# Patient Record
Sex: MALE | Race: WHITE | Employment: OTHER | URBAN - METROPOLITAN AREA
[De-identification: names, ages, dates, MRNs, and addresses within clinical notes are randomized per-mention and may not be internally consistent; named-entity substitution may affect disease eponyms.]

---

## 2017-02-14 ENCOUNTER — OFFICE VISIT (OUTPATIENT)
Dept: FAMILY MEDICINE | Facility: CLINIC | Age: 62
End: 2017-02-14
Payer: COMMERCIAL

## 2017-02-14 VITALS
HEIGHT: 76 IN | WEIGHT: 249 LBS | SYSTOLIC BLOOD PRESSURE: 129 MMHG | HEART RATE: 77 BPM | BODY MASS INDEX: 30.32 KG/M2 | DIASTOLIC BLOOD PRESSURE: 81 MMHG | OXYGEN SATURATION: 97 % | TEMPERATURE: 97.2 F

## 2017-02-14 DIAGNOSIS — E88.810 METABOLIC SYNDROME X: Primary | ICD-10-CM

## 2017-02-14 DIAGNOSIS — I10 BENIGN ESSENTIAL HYPERTENSION: ICD-10-CM

## 2017-02-14 PROCEDURE — 99213 OFFICE O/P EST LOW 20 MIN: CPT | Performed by: INTERNAL MEDICINE

## 2017-02-14 RX ORDER — METOPROLOL SUCCINATE 25 MG/1
25 TABLET, EXTENDED RELEASE ORAL DAILY
Qty: 30 TABLET | Refills: 3 | Status: SHIPPED | OUTPATIENT
Start: 2017-02-14 | End: 2017-05-08

## 2017-02-14 NOTE — PROGRESS NOTES
"[facility]  CLINIC PROGRESS NOTE    Subjective:     Metabolic syndrome X   Memo Lai continues on atorvastatin 20 mg daily and taking aspirin 81 mg daily  Benign essential hypertension   Blood pressure was recently checked at [facility] physical and was elevated 158/86.  He returns to the clinic today for recheck.  He is not taking any blood pressure medications and has no acute concerns.      Past medical history, medications, allergies, social history, family history reviewed and updated in [facility] as of 2/14/2017 .    ROS  CONSTITUTIONAL: no fatigue, no unexpected change in weight  SKIN: no worrisome rashes, no worrisome moles, no worrisome lesions  EYES: no acute vision problems or changes  ENT: no ear problems, no mouth problems, no throat problems  RESP: no significant cough, no shortness of breath  CV: no chest pain, no palpitations, no new or worsening peripheral edema  GI: no nausea, no vomiting, no constipation, no diarrhea  : no frequency, no dysuria, no hematuria  MS: no claudication, no myalgias, no joint aches  PSYCHIATRIC: no changes in mood or affect      Objective:  Vitals  /81  Pulse 77  Temp 97.2  F (36.2  C)  Ht 6' 4\" (1.93 m)  Wt 249 lb (112.9 kg)  SpO2 97%  BMI 30.31 kg/m2  GEN: Alert Oriented x3 NAD  HEENT: Atraumatic, normocephalic  CV: RRR no murmurs or rubs  PULM: CTA no wheezes or crackles  ABD:  no hepatosplenomegally  SKIN: No visible skin lesion or ulcerations  EXT:  No edema bilateral lower extremities  NEURO: Gait and station deferred, No focal neurologic deficits  PSYCH: Mood good, affect mood congruent    No results found for this or any previous visit (from the past 24 hour(s)).    Assessment/Plan:  Patient Instructions   (E88.81) Metabolic syndrome X  (primary encounter diagnosis)  Comment: Continue to take atorvastatin and asa daily   Plan:     (I10) Benign essential hypertension  Comment: blood pressure is well controlled today, but given the elevations " recently we will start a low dose of a beta blocker to help keep blood pressure better controlled - follow up in 2 weeks at pharmacy.  Plan: metoprolol (TOPROL-XL) 25 MG 24 hr tablet              15 minutes spent with patient.  Over 50% of time counseling    Follow up in 2 weeks    Disclaimer: This note consists of symbols derived from keyboarding, dictation and/or voice recognition software. As a result, there may be errors in the script that have gone undetected. Please consider this when interpreting information found in this chart.    Mathew Ernst MD  [phone]

## 2017-02-14 NOTE — PATIENT INSTRUCTIONS
(E88.81) Metabolic syndrome X  (primary encounter diagnosis)  Comment: Continue to take atorvastatin and asa daily   Plan:     (I10) Benign essential hypertension  Comment: blood pressure is well controlled today, but given the elevations recently we will start a low dose of a beta blocker to help keep blood pressure better controlled - follow up in 2 weeks at pharmacy.  Plan: metoprolol (TOPROL-XL) 25 MG 24 hr tablet

## 2017-02-14 NOTE — MR AVS SNAPSHOT
After Visit Summary   2/14/2017    Memo Lai    MRN: [id number]           Patient Information     Date Of Birth          1955        Visit Information        Provider Department      2/14/2017 4:00 PM Mathew Ernst MD [facility]        Today's Diagnoses     Metabolic syndrome X    -  1    Benign essential hypertension          Care Instructions    (E88.81) Metabolic syndrome X  (primary encounter diagnosis)  Comment: Continue to take metoprolol X daily   Plan:     (I10) Benign essential hypertension  Comment: blood pressure is well controlled today, but given the elevations recently we will start a low dose of a beta blocker to help keep blood pressure better controlled - follow up in 2 weeks at pharmacy.  Plan: metoprolol (TOPROL-XL) 25 MG 24 hr tablet                    Follow-ups after your visit        Who to contact     If you have questions or need follow up information about today's clinic visit or your schedule please contact [facility] directly at [phone].  Normal or non-critical lab and imaging results will be communicated to you by Fisochart, letter or phone within 4 business days after the clinic has received the results. If you do not hear from us within 7 days, please contact the clinic through [email or web address] or phone. If you have a critical or abnormal lab result, we will notify you by phone as soon as possible.  Submit refill requests through [employer] or call your pharmacy and they will forward the refill request to us. Please allow 3 business days for your refill to be completed.          Additional Information About Your Visit        Fisochart Information     [employer] gives you secure access to your electronic health record. If you see a primary care provider, you can also send messages to your care team and make appointments. If you have questions, please call your primary care clinic.  If you do not have a primary care provider, please call  "[phone] and they will assist you.        Care EveryWhere ID     This is your Care EveryWhere ID. This could be used by other organizations to access your [place] medical records  [id number]        Your Vitals Were     Pulse Temperature Height Pulse Oximetry BMI (Body Mass Index)       77 97.2  F (36.2  C) 6' 4\" (1.93 m) 97% 30.31 kg/m2        Blood Pressure from Last 3 Encounters:   02/14/17 129/81   08/12/16 120/80   07/06/16 137/80    Weight from Last 3 Encounters:   02/14/17 249 lb (112.9 kg)   08/12/16 240 lb (108.9 kg)   07/06/16 251 lb 4.8 oz (114 kg)              Today, you had the following     No orders found for display         Today's Medication Changes          These changes are accurate as of: 2/14/17  4:16 PM.  If you have any questions, ask your nurse or doctor.               Start taking these medicines.        Dose/Directions    metoprolol 25 MG 24 hr tablet   Commonly known as:  TOPROL-XL   Used for:  Benign essential hypertension   Started by:  Mathew Ernst MD        Dose:  25 mg   Take 1 tablet (25 mg) by mouth daily   Quantity:  30 tablet   Refills:  3            Where to get your medicines      These medications were sent to [facility] [address]  [address], [address], [facility] [address]     Phone:  [phone]     metoprolol 25 MG 24 hr tablet                Primary Care Provider Office Phone # Fax #    Mathew Ernst [phone]       [facility] [address]  [facility] [address]        Thank you!     Thank you for choosing [facility]  for your care. Our goal is always to provide you with excellent care. Hearing back from our patients is one way we can continue to improve our services. Please take a few minutes to complete the written survey that you may receive in the mail after your visit with us. Thank you!             Your Updated Medication List - " Protect others around you: Learn how to safely use, store and throw away your medicines at www.disposemymeds.org.          This list is accurate as of: 2/14/17  4:16 PM.  Always use your most recent med list.                   Brand Name Dispense Instructions for use    aspirin 81 MG EC tablet     90 tablet    Take 1 tablet by mouth daily.       atorvastatin 20 MG tablet    LIPITOR    90 tablet    Take 1 tablet (20 mg) by mouth daily       glucosamine-chondroitinoitin 2057-9708 MG/30ML Liqd      Take by mouth daily       metoprolol 25 MG 24 hr tablet    TOPROL-XL    30 tablet    Take 1 tablet (25 mg) by mouth daily       Multi-vitamin Tabs tablet     100 tablet    Take 1 tablet by mouth daily.

## 2017-02-14 NOTE — NURSING NOTE
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    Weight as of this encounter: 249 lb (112.9 kg).  Medication Reconciliation: pilar MENJIVAR CMA      "

## 2017-02-16 ENCOUNTER — APPOINTMENT (OUTPATIENT)
Dept: CT IMAGING | Facility: CLINIC | Age: 62
End: 2017-02-16
Attending: EMERGENCY MEDICINE
Payer: COMMERCIAL

## 2017-02-16 ENCOUNTER — HOSPITAL ENCOUNTER (EMERGENCY)
Facility: CLINIC | Age: 62
Discharge: HOME | End: 2017-02-16
Attending: EMERGENCY MEDICINE | Admitting: EMERGENCY MEDICINE
Payer: COMMERCIAL

## 2017-02-16 VITALS
BODY MASS INDEX: 29.83 KG/M2 | OXYGEN SATURATION: 98 % | HEART RATE: 68 BPM | RESPIRATION RATE: 20 BRPM | DIASTOLIC BLOOD PRESSURE: 82 MMHG | HEIGHT: 76 IN | SYSTOLIC BLOOD PRESSURE: 147 MMHG | TEMPERATURE: 98.1 F | WEIGHT: 245 LBS

## 2017-02-16 DIAGNOSIS — N20.1 CALCULUS OF URETER: ICD-10-CM

## 2017-02-16 LAB
ALBUMIN UR-MCNC: NEGATIVE MG/DL
ANION GAP SERPL CALCULATED.3IONS-SCNC: 12 MMOL/L (ref 3–14)
APPEARANCE UR: CLEAR
BASOPHILS # BLD AUTO: 0 10E9/L (ref 0–0.2)
BASOPHILS NFR BLD AUTO: 0.2 %
BILIRUB UR QL STRIP: NEGATIVE
BUN SERPL-MCNC: 21 MG/DL (ref 7–30)
CALCIUM SERPL-MCNC: 9 MG/DL (ref 8.5–10.1)
CHLORIDE SERPL-SCNC: 104 MMOL/L (ref 94–109)
CO2 SERPL-SCNC: 24 MMOL/L (ref 20–32)
COLOR UR AUTO: YELLOW
CREAT SERPL-MCNC: 1.02 MG/DL (ref 0.66–1.25)
DIFFERENTIAL METHOD BLD: ABNORMAL
EOSINOPHIL # BLD AUTO: 0.1 10E9/L (ref 0–0.7)
EOSINOPHIL NFR BLD AUTO: 0.6 %
ERYTHROCYTE [DISTWIDTH] IN BLOOD BY AUTOMATED COUNT: 13.3 % (ref 10–15)
GFR SERPL CREATININE-BSD FRML MDRD: 74 ML/MIN/1.7M2
GLUCOSE SERPL-MCNC: 111 MG/DL (ref 70–99)
GLUCOSE UR STRIP-MCNC: NEGATIVE MG/DL
HCT VFR BLD AUTO: 42.6 % (ref 40–53)
HGB BLD-MCNC: 14.8 G/DL (ref 13.3–17.7)
HGB UR QL STRIP: ABNORMAL
HYALINE CASTS #/AREA URNS LPF: ABNORMAL /LPF (ref 0–2)
IMM GRANULOCYTES # BLD: 0 10E9/L (ref 0–0.4)
IMM GRANULOCYTES NFR BLD: 0.2 %
KETONES UR STRIP-MCNC: 15 MG/DL
LEUKOCYTE ESTERASE UR QL STRIP: NEGATIVE
LYMPHOCYTES # BLD AUTO: 1.6 10E9/L (ref 0.8–5.3)
LYMPHOCYTES NFR BLD AUTO: 12.3 %
MCH RBC QN AUTO: 32 PG (ref 26.5–33)
MCHC RBC AUTO-ENTMCNC: 34.7 G/DL (ref 31.5–36.5)
MCV RBC AUTO: 92 FL (ref 78–100)
MONOCYTES # BLD AUTO: 1 10E9/L (ref 0–1.3)
MONOCYTES NFR BLD AUTO: 7.3 %
MUCOUS THREADS #/AREA URNS LPF: PRESENT /LPF
NEUTROPHILS # BLD AUTO: 10.4 10E9/L (ref 1.6–8.3)
NEUTROPHILS NFR BLD AUTO: 79.4 %
NITRATE UR QL: NEGATIVE
NRBC # BLD AUTO: 0 10*3/UL
NRBC BLD AUTO-RTO: 0 /100
PH UR STRIP: 5.5 PH (ref 5–7)
PLATELET # BLD AUTO: 265 10E9/L (ref 150–450)
POTASSIUM SERPL-SCNC: 3.6 MMOL/L (ref 3.4–5.3)
RBC # BLD AUTO: 4.62 10E12/L (ref 4.4–5.9)
RBC #/AREA URNS AUTO: ABNORMAL /HPF (ref 0–2)
SODIUM SERPL-SCNC: 140 MMOL/L (ref 133–144)
SP GR UR STRIP: 1.02 (ref 1–1.03)
URN SPEC COLLECT METH UR: ABNORMAL
UROBILINOGEN UR STRIP-ACNC: 0.2 EU/DL (ref 0.2–1)
WBC # BLD AUTO: 13.1 10E9/L (ref 4–11)
WBC #/AREA URNS AUTO: ABNORMAL /HPF (ref 0–2)

## 2017-02-16 PROCEDURE — 80048 BASIC METABOLIC PNL TOTAL CA: CPT | Performed by: EMERGENCY MEDICINE

## 2017-02-16 PROCEDURE — 96375 TX/PRO/DX INJ NEW DRUG ADDON: CPT

## 2017-02-16 PROCEDURE — 99285 EMERGENCY DEPT VISIT HI MDM: CPT | Mod: 25

## 2017-02-16 PROCEDURE — 85025 COMPLETE CBC W/AUTO DIFF WBC: CPT | Performed by: EMERGENCY MEDICINE

## 2017-02-16 PROCEDURE — 74176 CT ABD & PELVIS W/O CONTRAST: CPT

## 2017-02-16 PROCEDURE — 25000128 H RX IP 250 OP 636: Performed by: EMERGENCY MEDICINE

## 2017-02-16 PROCEDURE — 81001 URINALYSIS AUTO W/SCOPE: CPT | Performed by: EMERGENCY MEDICINE

## 2017-02-16 PROCEDURE — 96376 TX/PRO/DX INJ SAME DRUG ADON: CPT

## 2017-02-16 PROCEDURE — 96361 HYDRATE IV INFUSION ADD-ON: CPT

## 2017-02-16 PROCEDURE — 96374 THER/PROPH/DIAG INJ IV PUSH: CPT

## 2017-02-16 RX ORDER — HYDROMORPHONE HYDROCHLORIDE 1 MG/ML
0.5 INJECTION, SOLUTION INTRAMUSCULAR; INTRAVENOUS; SUBCUTANEOUS ONCE
Status: DISCONTINUED | OUTPATIENT
Start: 2017-02-16 | End: 2017-02-16 | Stop reason: HOSPADM

## 2017-02-16 RX ORDER — HYDROCODONE BITARTRATE AND ACETAMINOPHEN 5; 325 MG/1; MG/1
1-2 TABLET ORAL EVERY 6 HOURS PRN
Qty: 15 TABLET | Refills: 0 | Status: SHIPPED | OUTPATIENT
Start: 2017-02-16 | End: 2017-07-26

## 2017-02-16 RX ORDER — SODIUM CHLORIDE 9 MG/ML
1000 INJECTION, SOLUTION INTRAVENOUS CONTINUOUS
Status: DISCONTINUED | OUTPATIENT
Start: 2017-02-16 | End: 2017-02-16 | Stop reason: HOSPADM

## 2017-02-16 RX ORDER — HYDROMORPHONE HYDROCHLORIDE 1 MG/ML
0.5 INJECTION, SOLUTION INTRAMUSCULAR; INTRAVENOUS; SUBCUTANEOUS
Status: COMPLETED | OUTPATIENT
Start: 2017-02-16 | End: 2017-02-16

## 2017-02-16 RX ORDER — ONDANSETRON 4 MG/1
4 TABLET, ORALLY DISINTEGRATING ORAL EVERY 8 HOURS PRN
Qty: 10 TABLET | Refills: 0 | Status: SHIPPED | OUTPATIENT
Start: 2017-02-16 | End: 2017-02-19

## 2017-02-16 RX ORDER — ONDANSETRON 2 MG/ML
4 INJECTION INTRAMUSCULAR; INTRAVENOUS EVERY 30 MIN PRN
Status: DISCONTINUED | OUTPATIENT
Start: 2017-02-16 | End: 2017-02-16 | Stop reason: HOSPADM

## 2017-02-16 RX ORDER — TAMSULOSIN HYDROCHLORIDE 0.4 MG/1
0.4 CAPSULE ORAL AT BEDTIME
Qty: 10 CAPSULE | Refills: 0 | Status: SHIPPED | OUTPATIENT
Start: 2017-02-16 | End: 2017-02-26

## 2017-02-16 RX ADMIN — HYDROMORPHONE HYDROCHLORIDE 0.5 MG: 1 INJECTION, SOLUTION INTRAMUSCULAR; INTRAVENOUS; SUBCUTANEOUS at 18:48

## 2017-02-16 RX ADMIN — HYDROMORPHONE HYDROCHLORIDE 0.5 MG: 1 INJECTION, SOLUTION INTRAMUSCULAR; INTRAVENOUS; SUBCUTANEOUS at 18:58

## 2017-02-16 RX ADMIN — HYDROMORPHONE HYDROCHLORIDE 0.5 MG: 1 INJECTION, SOLUTION INTRAMUSCULAR; INTRAVENOUS; SUBCUTANEOUS at 19:59

## 2017-02-16 RX ADMIN — SODIUM CHLORIDE 1000 ML: 9 INJECTION, SOLUTION INTRAVENOUS at 18:48

## 2017-02-16 RX ADMIN — ONDANSETRON 4 MG: 2 SOLUTION INTRAMUSCULAR; INTRAVENOUS at 18:48

## 2017-02-16 ASSESSMENT — ENCOUNTER SYMPTOMS
HEMATURIA: 0
NAUSEA: 1
CONSTIPATION: 0
FEVER: 0
DIARRHEA: 0
FLANK PAIN: 1
VOMITING: 0
DIFFICULTY URINATING: 0
ABDOMINAL PAIN: 0
CHILLS: 0
DYSURIA: 0

## 2017-02-16 NOTE — ED AVS SNAPSHOT
Emergency Department    [address] [facility] [address]    Phone:  [phone]    Fax:  [phone]                                       Memo Lai   MRN: [id number]    Department:   Emergency Department   Date of Visit:  2/16/2017           After Visit Summary Signature Page     I have received my discharge instructions, and my questions have been answered. I have discussed any challenges I see with this plan with the nurse or doctor.    ..........................................................................................................................................  Patient/Patient Representative Signature      ..........................................................................................................................................  Patient Representative Print Name and Relationship to Patient    ..................................................               ................................................  Date                                            Time    ..........................................................................................................................................  Reviewed by Signature/Title    ...................................................              ..............................................  Date                                                            Time

## 2017-02-16 NOTE — ED AVS SNAPSHOT
"  Emergency Department    [address] [facility] [address]    Phone:  [phone]    Fax:  [phone]                                       Memo Lai   MRN: [id number]    Department:   Emergency Department   Date of Visit:  2/16/2017           Patient Information     Date Of Birth          1955        Your diagnoses for this visit were:     Calculus of ureter        You were seen by Rachid Catherine MD.      Follow-up Information     Follow up with Mathew Ernst MD In 3 days.    Specialty:  Internal Medicine    Contact information:    [facility]  [address]  [facility] [address]  [phone]          Follow up with  Emergency Department.    Specialty:  EMERGENCY MEDICINE    Why:  As needed, If symptoms worsen    Contact information:    [address] [id number] [phone]        Discharge Instructions          * KIDNEY STONE (w/ Colic)    The sharp cramping pain and nausea/vomiting that you have is due to a small stone which has formed in the kidney and is now passing down a narrow tube (ureter) on its way to your bladder. Once it reaches your bladder, the pain will stop. The stone may pass in your urine stream in one piece. [The size may be 1/16\" to 1/4\" (1-6mm)]. Or, the stone may also break up into ash fragments which you may not even notice.  Once you have had a kidney stone there is a risk for recurrence in the future.  HOME CARE:    Drink lots of fluid (at least 8-10 glasses of water a day).    Most stones will pass on their own, but may take from a few hours to a few days.    Each time you urinate, do so in a jar. Pour the urine from the jar through the strainer and into the toilet. Continue doing this until 24 hours after your pain stops. By then, if there was a kidney stone, it should pass from your bladder. Some stones dissolve into sand-like particles and pass right through the strainer. In that " case, you won't ever see a stone.    Save any stone that you find in the strainer and bring it to your doctor for analysis. It may be possible to prevent certain types of stones from forming. Therefore, it is important to know what kind of stone you have.    Try to stay as active as possible since this will help the stone pass. Do not stay in bed unless your pain prevents you from getting up. You may notice a red, pink or brown color to your urine. This is normal while passing a kidney stone.  FOLLOW UP with your doctor or return to this facility if the pain lasts more than 48 hours.  GET PROMPT MEDICAL ATTENTION if any of the following occur:    Pain that is not controlled by the medicine given    Repeated vomiting or unable to keep down fluids    Weakness, dizziness or fainting    Fever over 101  F (38.3  C)    Passage of solid red or brown urine (can't see through it) or urine with lots of blood clots    Unable to pass urine for 8 hours and increasing bladder pressure    [id number] [address], [place] [address]. All rights reserved. This information is not intended as a substitute for professional medical care. Always follow your healthcare professional's instructions.      24 Hour Appointment Hotline       To make an appointment at any [facility] clinic, call [phone] ([phone]). If you don't have a family doctor or clinic, we will help you find one. [facility] clinics are conveniently located to serve the needs of you and your family.             Review of your medicines      START taking        Dose / Directions Last dose taken    HYDROcodone-acetaminophen 5-325 MG per tablet   Commonly known as:  NORCO   Dose:  1-2 tablet   Quantity:  15 tablet        Take 1-2 tablets by mouth every 6 hours as needed   Refills:  0        ondansetron 4 MG ODT tab   Commonly known as:  ZOFRAN ODT   Dose:  4 mg   Quantity:  10 tablet        Take 1 tablet (4 mg) by mouth every 8 hours as needed    Refills:  0        tamsulosin 0.4 MG capsule   Commonly known as:  FLOMAX   Dose:  0.4 mg   Quantity:  10 capsule        Take 1 capsule (0.4 mg) by mouth At Bedtime for 10 doses   Refills:  0          Our records show that you are taking the medicines listed below. If these are incorrect, please call your family doctor or clinic.        Dose / Directions Last dose taken    aspirin 81 MG EC tablet   Dose:  81 mg   Quantity:  90 tablet        Take 1 tablet by mouth daily.   Refills:  3        atorvastatin 20 MG tablet   Commonly known as:  LIPITOR   Dose:  20 mg   Quantity:  90 tablet        Take 1 tablet (20 mg) by mouth daily   Refills:  3        glucosamine-chondroitinoitin 7819-1515 MG/30ML Liqd        Take by mouth daily   Refills:  0        metoprolol 25 MG 24 hr tablet   Commonly known as:  TOPROL-XL   Dose:  25 mg   Quantity:  30 tablet        Take 1 tablet (25 mg) by mouth daily   Refills:  3        Multi-vitamin Tabs tablet   Dose:  1 tablet   Quantity:  100 tablet        Take 1 tablet by mouth daily.   Refills:  3                Prescriptions were sent or printed at these locations (3 Prescriptions)                   Other Prescriptions                Printed at Department/Unit printer (3 of 3)         HYDROcodone-acetaminophen (NORCO) 5-325 MG per tablet               ondansetron (ZOFRAN ODT) 4 MG ODT tab               tamsulosin (FLOMAX) 0.4 MG capsule                Procedures and tests performed during your visit     *UA reflex to Microscopic (ED Lab POCT Only 3-11)    Abd/pelvis CT no contrast - Stone Protocol    Basic metabolic panel    CBC with platelets differential    Urine Microscopic      Orders Needing Specimen Collection     None      Pending Results     Date and Time Order Name Status Description    2/16/2017 1856 Abd/pelvis CT no contrast - Stone Protocol Preliminary             Pending Culture Results     No orders found from 2/14/2017 to 2/17/2017.             Test Results from your  hospital stay     2/16/2017  7:41 PM - Interface, Flexilab Results      Component Results     Component Value Ref Range & Units Status    WBC 13.1 (H) 4.0 - 11.0 10e9/L Final    RBC Count 4.62 4.4 - 5.9 10e12/L Final    Hemoglobin 14.8 13.3 - 17.7 g/dL Final    Hematocrit 42.6 40.0 - 53.0 % Final    MCV 92 78 - 100 fl Final    MCH 32.0 26.5 - 33.0 pg Final    MCHC 34.7 31.5 - 36.5 g/dL Final    RDW 13.3 10.0 - 15.0 % Final    Platelet Count 265 150 - 450 10e9/L Final    Diff Method Automated Method  Final    % Neutrophils 79.4 % Final    % Lymphocytes 12.3 % Final    % Monocytes 7.3 % Final    % Eosinophils 0.6 % Final    % Basophils 0.2 % Final    % Immature Granulocytes 0.2 % Final    Nucleated RBCs 0 0 /100 Final    Absolute Neutrophil 10.4 (H) 1.6 - 8.3 10e9/L Final    Absolute Lymphocytes 1.6 0.8 - 5.3 10e9/L Final    Absolute Monocytes 1.0 0.0 - 1.3 10e9/L Final    Absolute Eosinophils 0.1 0.0 - 0.7 10e9/L Final    Absolute Basophils 0.0 0.0 - 0.2 10e9/L Final    Abs Immature Granulocytes 0.0 0 - 0.4 10e9/L Final    Absolute Nucleated RBC 0.0  Final         2/16/2017  7:55 PM - Interface, Flexilab Results      Component Results     Component Value Ref Range & Units Status    Sodium 140 133 - 144 mmol/L Final    Potassium 3.6 3.4 - 5.3 mmol/L Final    Chloride 104 94 - 109 mmol/L Final    Carbon Dioxide 24 20 - 32 mmol/L Final    Anion Gap 12 3 - 14 mmol/L Final    Glucose 111 (H) 70 - 99 mg/dL Final    Urea Nitrogen 21 7 - 30 mg/dL Final    Creatinine 1.02 0.66 - 1.25 mg/dL Final    GFR Estimate 74 >60 mL/min/1.7m2 Final    Non  GFR Calc    GFR Estimate If Black 90 >60 mL/min/1.7m2 Final    African American GFR Calc    Calcium 9.0 8.5 - 10.1 mg/dL Final         2/16/2017  9:02 PM - Interface, Flexilab Results      Component Results     Component Value Ref Range & Units Status    Color Urine Yellow  Final    Appearance Urine Clear  Final    Glucose Urine Negative NEG mg/dL Final    Bilirubin  Urine Negative NEG Final    Ketones Urine 15 (A) NEG mg/dL Final    Specific Gravity Urine 1.025 1.003 - 1.035 Final    Blood Urine Moderate (A) NEG Final    pH Urine 5.5 5.0 - 7.0 pH Final    Protein Albumin Urine Negative NEG mg/dL Final    Urobilinogen Urine 0.2 0.2 - 1.0 EU/dL Final    Nitrite Urine Negative NEG Final    Leukocyte Esterase Urine Negative NEG Final    Source Midstream Urine  Final         2/16/2017  8:14 PM - Interface, Radiant Ib      Narrative     CT ABDOMEN AND PELVIS WITHOUT CONTRAST   2/16/2017 8:01 PM     HISTORY: History of kidney stone 20 years ago. Right flank pain.    COMPARISON: None.    TECHNIQUE: Without intravenous or oral contrast, helical sections were  acquired from the top of the diaphragm through the pubic symphysis.  Coronal reconstructions were generated. Radiation dose for this scan  was reduced using automated exposure control, adjustment of the mA  and/or kV according to the patient's size, or iterative reconstruction  technique. (Renal stone protocol)    FINDINGS:     Right urinary tract: 0.3 cm calculus in the most distal aspect of the  ureter at the ureterovesicular junction. Mild dilatation of the  intrarenal collecting system and ureter. Two 0.2 cm nonobstructing  calculi in the inferior pole of the kidney. Slight perinephric  haziness.    Left urinary tract: Approximately four nonobstructing calculi in the  kidney, measuring up to 0.6 cm in diameter. No ureteral calculi. No  dilatation of the intrarenal collecting system or ureter. 2 cm cyst in  the inferior pole of the kidney.    Urinary bladder: No additional visualized calculi.    Remainder of the abdomen and pelvis: The liver, spleen, pancreas and  adrenal glands are unremarkable to the limits of a noncontrast CT  scan. 2.8 cm calculus in the gallbladder. The small and large bowel  are normal in caliber. The appendix is unremarkable. No bowel wall  thickening, pneumatosis or free intraperitoneal gas. No enlarged  lymph  nodes or free fluid in the abdomen or pelvis. Atherosclerotic  calcification in the abdominal aorta. Small periumbilical hernia  containing fat.    Scan through the lower chest is unremarkable.        Impression     IMPRESSION:   1. 0.3 cm calculus in the most distal aspect of the right ureter at  the ureterovesicular junction, resulting in mild obstruction.  2. A few nonobstructing bilateral renal calculi.           2/16/2017  9:02 PM - Interface, Flexilab Results      Component Results     Component Value Ref Range & Units Status    WBC Urine O - 2 0 - 2 /HPF Final    RBC Urine 2-5 (A) 0 - 2 /HPF Final    Hyaline Casts O - 2 0 - 2 /LPF Final    Mucous Urine Present (A) NEG /LPF Final                Clinical Quality Measure: Blood Pressure Screening     Your blood pressure was checked while you were in the emergency department today. The last reading we obtained was  BP: 147/82 . Please read the guidelines below about what these numbers mean and what you should do about them.  If your systolic blood pressure (the top number) is less than 120 and your diastolic blood pressure (the bottom number) is less than 80, then your blood pressure is normal. There is nothing more that you need to do about it.  If your systolic blood pressure (the top number) is 120-139 or your diastolic blood pressure (the bottom number) is 80-89, your blood pressure may be higher than it should be. You should have your blood pressure rechecked within a year by a primary care provider.  If your systolic blood pressure (the top number) is 140 or greater or your diastolic blood pressure (the bottom number) is 90 or greater, you may have high blood pressure. High blood pressure is treatable, but if left untreated over time it can put you at risk for heart attack, stroke, or kidney failure. You should have your blood pressure rechecked by a primary care provider within the next 4 weeks.  If your provider in the emergency department today  gave you specific instructions to follow-up with your doctor or provider even sooner than that, you should follow that instruction and not wait for up to 4 weeks for your follow-up visit.        Thank you for choosing [facility]       Thank you for choosing [facility] for your care. Our goal is always to provide you with excellent care. Hearing back from our patients is one way we can continue to improve our services. Please take a few minutes to complete the written survey that you may receive in the mail after you visit with us. Thank you!        [email or web address] work Information     [employer] gives you secure access to your electronic health record. If you see a primary care provider, you can also send messages to your care team and make appointments. If you have questions, please call your primary care clinic.  If you do not have a primary care provider, please call [phone] and they will assist you.        Care EveryWhere ID     This is your Care EveryWhere ID. This could be used by other organizations to access your [facility] medical records  [id number]        After Visit Summary       This is your record. Keep this with you and show to your community pharmacist(s) and doctor(s) at your next visit.

## 2017-02-17 NOTE — ED PROVIDER NOTES
"  History     Chief Complaint:  Flank Pain    HPI   Memo Lai is a 61 year old male with a history of kidney stone 20 years ago and hyperlipidemia who presents with right flank pain. The patient reports that he went to the bathroom around 3pm this afternoon and was able to urinate fine but developed sudden onset right flank pain after urinating. The pain has been progressively worsening since and did not respond to Ibuprofen, prompting him to come to the ED for evaluation. On arrival to the ED, the patient reports that he has right flank pain that he rates at 7/10 along with nausea. He denies any vomiting, fever, hematuria, diarrhea, or constipation. The patient states this feels similar to his kidney stone pain 20 years ago.     Allergies:  No known drug allergies     Medications:    Metoprolol  Atorvastatin  Glucosamine-chondroitin   Aspirin 81 mg    Past Medical History:    Hyperlipidemia  Hypertension  Kidney stone  Ruptured eardrum  Metabolic syndrome    Past Surgical History:    Meniscus tear and repair right knee  Kidney stone surgery    Family History:    Colon cancer  Cardiovascular     Social History:  Smoking status: Former smoker, quit 1981  Alcohol use: Yes, occasional   Presents to the ED with his wife  Marital Status:   [2]     Review of Systems   Constitutional: Negative for chills and fever.   Gastrointestinal: Positive for nausea. Negative for abdominal pain, constipation, diarrhea and vomiting.   Genitourinary: Positive for flank pain. Negative for difficulty urinating, dysuria and hematuria.   All other systems reviewed and are negative.      Physical Exam   Patient Vitals for the past 24 hrs:   BP Temp Temp src Pulse Resp SpO2 Height Weight   02/16/17 1844 147/82 - - - - - - -   02/16/17 1822 169/84 98.1  F (36.7  C) Oral 68 20 98 % 1.93 m (6' 4\") 111.1 kg (245 lb)     Physical Exam  General: Appears well-developed and well-nourished.   Head: No signs of trauma.   Mouth/Throat: " Oropharynx is clear and moist.   CV: Normal rate and regular rhythm.    Resp: Effort normal and breath sounds normal. No respiratory distress.   GI: Soft. There is no tenderness or guarding.  Normal bowel sounds.  Mild right CVA tenderness.  MSK: Normal range of motion. no edema. No Calf tenderness.  Neuro: The patient is alert and oriented.  Speech normal.  Skin: Skin is warm and dry. No rash noted.   Psych: normal mood and affect. behavior is normal.       Emergency Department Course   Imaging:  Radiographic findings were communicated with the patient who voiced understanding of the findings.    CT-scan Abdomen/Pelvis w/o contrast:  IMPRESSION:   1. 0.3 cm calculus in the most distal aspect of the right ureter at  the ureterovesicular junction, resulting in mild obstruction.  2. A few nonobstructing bilateral renal calculi.    Preliminary result per radiology.     Laboratory:  CBC: WBC 13.1(H), o/w WNL (HGB 14.8, )   BMP: Glucose 111(H), o/w WNL (Creatinine 1.02)  UA: Ketones 15, Blood moderate, RBC 2-5, Mucous present, o/w negative    Interventions:  1848: NS 1L IV Bolus  1848: Zofran 4 mg IV  1848: Dilaudid 0.5 mg IV  1858: Dilaudid 0.5 mg IV  1959: Dilaudid 0.5 mg IV    Emergency Department Course:  Past medical records, nursing notes, and vitals reviewed.  1839: I performed an exam of the patient and obtained history, as documented above.  IV inserted and blood drawn.  The patient was sent for a CT abdomen pelvis while in the emergency department, findings above.    2105: I rechecked the patient. Explained findings to the patient.    I rechecked the patient.  Findings and plan explained to the Patient. Patient discharged home with instructions regarding supportive care, medications, and reasons to return. The importance of close follow-up was reviewed.     Impression & Plan    Medical Decision Making:  Memo Lai is a 61 year old male who presents with flank pain. A broad differential diagnosis  was considered including diverticulitis, ureterolithiasis, tumor, colitis, cholecystitis, aneurysm, dissection, volvulus, appendicitis, splenic issue, stomach pathology, ulcer, hydronephrosis, pneumonia, rib fracture, UTI, pyelonephritis amongst many other etiologies. Signs and symptoms consistent with ureterolithiasis.  Patients pain is controlled in ED.  No signs of infected stone.  Patient is hemodynamically stable in ED. Plan is home with abdominal pain recheck by primary care physician or return to ED if symptoms worsen.  Return for fevers greater than 102, increasing pain, other new symptoms develop.  Ureterolithiasis precautions for home.  Questions were answered.     Diagnosis:    ICD-10-CM   1. Calculus of ureter N20.1     Disposition: Discharged to home    Discharge Medications:   Details   HYDROcodone-acetaminophen (NORCO) 5-325 MG per tablet Take 1-2 tablets by mouth every 6 hours as needed, Disp-15 tablet, R-0, Local Print      ondansetron (ZOFRAN ODT) 4 MG ODT tab Take 1 tablet (4 mg) by mouth every 8 hours as needed, Disp-10 tablet, R-0, Local Print      tamsulosin (FLOMAX) 0.4 MG capsule Take 1 capsule (0.4 mg) by mouth At Bedtime for 10 doses, Disp-10 capsule, R-0, Local Print     Netta Worley  2/16/2017    EMERGENCY DEPARTMENT    I, Netta Worley, am serving as a scribe at 6:39 PM on 2/16/2017 to document services personally performed by Rachid Catherine MD based on my observations and the provider's statements to me.        Rachid Catherine MD  02/20/17 4940

## 2017-02-17 NOTE — DISCHARGE INSTRUCTIONS
"   * KIDNEY STONE (w/ Colic)    The sharp cramping pain and nausea/vomiting that you have is due to a small stone which has formed in the kidney and is now passing down a narrow tube (ureter) on its way to your bladder. Once it reaches your bladder, the pain will stop. The stone may pass in your urine stream in one piece. [The size may be 1/16\" to 1/4\" (1-6mm)]. Or, the stone may also break up into ash fragments which you may not even notice.  Once you have had a kidney stone there is a risk for recurrence in the future.  HOME CARE:    Drink lots of fluid (at least 8-10 glasses of water a day).    Most stones will pass on their own, but may take from a few hours to a few days.    Each time you urinate, do so in a jar. Pour the urine from the jar through the strainer and into the toilet. Continue doing this until 24 hours after your pain stops. By then, if there was a kidney stone, it should pass from your bladder. Some stones dissolve into sand-like particles and pass right through the strainer. In that case, you won't ever see a stone.    Save any stone that you find in the strainer and bring it to your doctor for analysis. It may be possible to prevent certain types of stones from forming. Therefore, it is important to know what kind of stone you have.    Try to stay as active as possible since this will help the stone pass. Do not stay in bed unless your pain prevents you from getting up. You may notice a red, pink or brown color to your urine. This is normal while passing a kidney stone.  FOLLOW UP with your doctor or return to this facility if the pain lasts more than 48 hours.  GET PROMPT MEDICAL ATTENTION if any of the following occur:    Pain that is not controlled by the medicine given    Repeated vomiting or unable to keep down fluids    Weakness, dizziness or fainting    Fever over 101  F (38.3  C)    Passage of solid red or brown urine (can't see through it) or urine with lots of blood clots    Unable " to pass urine for 8 hours and increasing bladder pressure    [id number] Kevin Smith, [address] [place] [address], [place] [address]. All rights reserved. This information is not intended as a substitute for professional medical care. Always follow your healthcare professional's instructions.

## 2017-02-17 NOTE — ED NOTES
Bed: ED10  Expected date:   Expected time:   Means of arrival:   Comments:  For kidney stone patient

## 2017-03-09 ENCOUNTER — ALLIED HEALTH/NURSE VISIT (OUTPATIENT)
Dept: FAMILY MEDICINE | Facility: CLINIC | Age: 62
End: 2017-03-09
Payer: COMMERCIAL

## 2017-03-09 VITALS — DIASTOLIC BLOOD PRESSURE: 76 MMHG | SYSTOLIC BLOOD PRESSURE: 132 MMHG

## 2017-03-09 DIAGNOSIS — Z01.30 BP CHECK: Primary | ICD-10-CM

## 2017-03-09 PROCEDURE — 99207 ZZC NO CHARGE NURSE ONLY: CPT | Performed by: INTERNAL MEDICINE

## 2017-03-09 NOTE — MR AVS SNAPSHOT
After Visit Summary   3/9/2017    Memo Lai    MRN: [id number]           Patient Information     Date Of Birth          1955        Visit Information        Provider Department      3/9/2017 12:53 PM Mathew Ernst MD [facility]        Today's Diagnoses     BP check    -  1       Follow-ups after your visit        Who to contact     If you have questions or need follow up information about today's clinic visit or your schedule please contact [facility] directly at [phone].  Normal or non-critical lab and imaging results will be communicated to you by [employer], letter or phone within 4 business days after the clinic has received the results. If you do not hear from us within 7 days, please contact the clinic through Med.lyt or phone. If you have a critical or abnormal lab result, we will notify you by phone as soon as possible.  Submit refill requests through [employer] or call your pharmacy and they will forward the refill request to us. Please allow 3 business days for your refill to be completed.          Additional Information About Your Visit        MyChart Information     [employer] gives you secure access to your electronic health record. If you see a primary care provider, you can also send messages to your care team and make appointments. If you have questions, please call your primary care clinic.  If you do not have a primary care provider, please call [phone] and they will assist you.        Care EveryWhere ID     This is your Care EveryWhere ID. This could be used by other organizations to access your [facility] medical records  [id number]         Blood Pressure from Last 3 Encounters:   03/09/17 132/76   02/16/17 147/82   02/14/17 129/81    Weight from Last 3 Encounters:   02/16/17 245 lb (111.1 kg)   02/14/17 249 lb (112.9 kg)   08/12/16 240 lb (108.9 kg)              Today, you had the following     No orders found for display        Primary Care Provider Office Phone # Fax #    Mathew Ernst [phone]       [facility] [address] [facility] [address]  [facility] [address]        Thank you!     Thank you for choosing [facility]  for your care. Our goal is always to provide you with excellent care. Hearing back from our patients is one way we can continue to improve our services. Please take a few minutes to complete the written survey that you may receive in the mail after your visit with us. Thank you!             Your Updated Medication List - Protect others around you: Learn how to safely use, store and throw away your medicines at www.disposemymeds.org.          This list is accurate as of: 3/9/17 11:59 PM.  Always use your most recent med list.                   Brand Name Dispense Instructions for use    aspirin 81 MG EC tablet     90 tablet    Take 1 tablet by mouth daily.       atorvastatin 20 MG tablet    LIPITOR    90 tablet    Take 1 tablet (20 mg) by mouth daily       glucosamine-chondroitinoitin 3578-6643 MG/30ML Liqd      Take by mouth daily       HYDROcodone-acetaminophen 5-325 MG per tablet    NORCO    15 tablet    Take 1-2 tablets by mouth every 6 hours as needed       metoprolol 25 MG 24 hr tablet    TOPROL-XL    30 tablet    Take 1 tablet (25 mg) by mouth daily       Multi-vitamin Tabs tablet     100 tablet    Take 1 tablet by mouth daily.

## 2017-03-09 NOTE — PROGRESS NOTES
Memo Lai is enrolled/participating in the retail pharmacy Blood Pressure Goals Achievement Program (BPGAP).  Memo Lai was evaluated at [facility] on March 9, 2017 at which time his blood pressure was:    BP Readings from Last 3 Encounters:   03/09/17 132/76   02/16/17 147/82   02/14/17 129/81     Reviewed lifestyle modifications for blood pressure control and reduction: including making healthy food choices, managing weight, getting regular exercise, smoking cessation, reducing alcohol consumption, monitoring blood pressure regularly.     Memo Lai is not experiencing symptoms.1st bp reading 152/82, waited 5 minutes and 2nd bp 132/76    Follow-Up: BP is not at goal of < 150/90 mmHg (patient 60+ years of age without diabetes), Recommended follow-up in 1 month at the pharmacy. Routing to PCP as an FYI.    Completed by: Irlanda Luther, PharmD, [facility]     [facility]  [phone]

## 2017-03-09 NOTE — Clinical Note
Routing message to PCP for review -BP checked at pharmacy and noted to be above goal. Recommended patient follow-up with PCP.

## 2017-04-07 ENCOUNTER — ALLIED HEALTH/NURSE VISIT (OUTPATIENT)
Dept: FAMILY MEDICINE | Facility: CLINIC | Age: 62
End: 2017-04-07
Payer: COMMERCIAL

## 2017-04-07 VITALS — DIASTOLIC BLOOD PRESSURE: 78 MMHG | SYSTOLIC BLOOD PRESSURE: 128 MMHG

## 2017-04-07 DIAGNOSIS — Z01.30 BP CHECK: Primary | ICD-10-CM

## 2017-04-07 PROCEDURE — 99207 ZZC NO CHARGE NURSE ONLY: CPT | Performed by: INTERNAL MEDICINE

## 2017-04-07 NOTE — MR AVS SNAPSHOT
After Visit Summary   4/7/2017    Memo Lai    MRN: [id number]           Patient Information     Date Of Birth          1955        Visit Information        Provider Department      4/7/2017 9:20 AM Mathew Ernst MD [facility]        Today's Diagnoses     BP check    -  1       Follow-ups after your visit        Who to contact     If you have questions or need follow up information about today's clinic visit or your schedule please contact [facility] directly at [phone].  Normal or non-critical lab and imaging results will be communicated to you by [employer], letter or phone within 4 business days after the clinic has received the results. If you do not hear from us within 7 days, please contact the clinic through [employer] or phone. If you have a critical or abnormal lab result, we will notify you by phone as soon as possible.  Submit refill requests through ehealthtracker or call your pharmacy and they will forward the refill request to us. Please allow 3 business days for your refill to be completed.          Additional Information About Your Visit        MyChart Information     [employer] gives you secure access to your electronic health record. If you see a primary care provider, you can also send messages to your care team and make appointments. If you have questions, please call your primary care clinic.  If you do not have a primary care provider, please call [phone] and they will assist you.        Care EveryWhere ID     This is your Care EveryWhere ID. This could be used by other organizations to access your [facility] medical records  [id number]         Blood Pressure from Last 3 Encounters:   04/07/17 128/78   03/09/17 132/76   02/16/17 147/82    Weight from Last 3 Encounters:   02/16/17 245 lb (111.1 kg)   02/14/17 249 lb (112.9 kg)   08/12/16 240 lb (108.9 kg)              Today, you had the following     No orders found for display        Primary Care Provider Office Phone # Fax #    Mathew Ernst [phone]       [facility] [address] [facility] [address]  [facility] [address]        Thank you!     Thank you for choosing [facility]  for your care. Our goal is always to provide you with excellent care. Hearing back from our patients is one way we can continue to improve our services. Please take a few minutes to complete the written survey that you may receive in the mail after your visit with us. Thank you!             Your Updated Medication List - Protect others around you: Learn how to safely use, store and throw away your medicines at www.disposemymeds.org.          This list is accurate as of: 4/7/17  9:21 AM.  Always use your most recent med list.                   Brand Name Dispense Instructions for use    aspirin 81 MG EC tablet     90 tablet    Take 1 tablet by mouth daily.       atorvastatin 20 MG tablet    LIPITOR    90 tablet    Take 1 tablet (20 mg) by mouth daily       glucosamine-chondroitinoitin 4791-8287 MG/30ML Liqd      Take by mouth daily       HYDROcodone-acetaminophen 5-325 MG per tablet    NORCO    15 tablet    Take 1-2 tablets by mouth every 6 hours as needed       metoprolol 25 MG 24 hr tablet    TOPROL-XL    30 tablet    Take 1 tablet (25 mg) by mouth daily       Multi-vitamin Tabs tablet     100 tablet    Take 1 tablet by mouth daily.

## 2017-04-07 NOTE — PROGRESS NOTES
Memo Lai is enrolled/participating in the retail pharmacy Blood Pressure Goals Achievement Program (BPGAP).  Memo Lai was evaluated at [facility] on April 7, 2017 at which time his blood pressure was:    BP Readings from Last 3 Encounters:   04/07/17 128/78   03/09/17 132/76   02/16/17 147/82     Reviewed lifestyle modifications for blood pressure control and reduction: including making healthy food choices, managing weight, getting regular exercise, smoking cessation, reducing alcohol consumption, monitoring blood pressure regularly.     Memo Lai is not experiencing symptoms.    Follow-Up: BP is at goal of < 140/90mmHg (patient 18+ years of age with or without diabetes).  Recommended follow-up at pharmacy in 6 months.     Completed by: Irlanda Luther, PharmD, [facility]     [facility]  [phone]

## 2017-05-08 DIAGNOSIS — I10 BENIGN ESSENTIAL HYPERTENSION: ICD-10-CM

## 2017-05-08 NOTE — TELEPHONE ENCOUNTER
Pending Prescriptions:                       Disp   Refills    metoprolol (TOPROL-XL) 25 MG 24 hr tablet  30 tab*3        Sig: Take 1 tablet (25 mg) by mouth daily        Patient wants 90day supply  Last Written Prescription Date: 02/14/2017  Last Fill Quantity: 30, # refills: 3  Last Office Visit with [facility], P or [facility] prescribing provider: 02/14/2017       Potassium   Date Value Ref Range Status   02/16/2017 3.6 3.4 - 5.3 mmol/L Final     Creatinine   Date Value Ref Range Status   02/16/2017 1.02 0.66 - 1.25 mg/dL Final     BP Readings from Last 3 Encounters:   04/07/17 128/78   03/09/17 132/76   02/16/17 147/82

## 2017-05-08 NOTE — TELEPHONE ENCOUNTER
Patient requesting a 90 day supply of medication.  Please order 90 day supply.    [facility] Pharmacy Technician  [facility]  [phone] fax [phone]

## 2017-05-09 RX ORDER — METOPROLOL SUCCINATE 25 MG/1
25 TABLET, EXTENDED RELEASE ORAL DAILY
Qty: 90 TABLET | Refills: 0 | Status: SHIPPED | OUTPATIENT
Start: 2017-05-09 | End: 2017-07-26

## 2017-07-24 DIAGNOSIS — Z00.00 ROUTINE GENERAL MEDICAL EXAMINATION AT A HEALTH CARE FACILITY: ICD-10-CM

## 2017-07-24 LAB
ALBUMIN SERPL-MCNC: 3.9 G/DL (ref 3.4–5)
ALP SERPL-CCNC: 74 U/L (ref 40–150)
ALT SERPL W P-5'-P-CCNC: 51 U/L (ref 0–70)
ANION GAP SERPL CALCULATED.3IONS-SCNC: 6 MMOL/L (ref 3–14)
AST SERPL W P-5'-P-CCNC: 30 U/L (ref 0–45)
BILIRUB SERPL-MCNC: 0.6 MG/DL (ref 0.2–1.3)
BUN SERPL-MCNC: 14 MG/DL (ref 7–30)
CALCIUM SERPL-MCNC: 9.1 MG/DL (ref 8.5–10.1)
CHLORIDE SERPL-SCNC: 108 MMOL/L (ref 94–109)
CHOLEST SERPL-MCNC: 183 MG/DL
CO2 SERPL-SCNC: 27 MMOL/L (ref 20–32)
CREAT SERPL-MCNC: 0.83 MG/DL (ref 0.66–1.25)
ERYTHROCYTE [DISTWIDTH] IN BLOOD BY AUTOMATED COUNT: 13.5 % (ref 10–15)
GFR SERPL CREATININE-BSD FRML MDRD: NORMAL ML/MIN/1.7M2
GLUCOSE SERPL-MCNC: 93 MG/DL (ref 70–99)
HCT VFR BLD AUTO: 43.3 % (ref 40–53)
HDLC SERPL-MCNC: 52 MG/DL
HGB BLD-MCNC: 15.1 G/DL (ref 13.3–17.7)
LDLC SERPL CALC-MCNC: 100 MG/DL
MCH RBC QN AUTO: 32 PG (ref 26.5–33)
MCHC RBC AUTO-ENTMCNC: 34.9 G/DL (ref 31.5–36.5)
MCV RBC AUTO: 92 FL (ref 78–100)
NONHDLC SERPL-MCNC: 131 MG/DL
PLATELET # BLD AUTO: 213 10E9/L (ref 150–450)
POTASSIUM SERPL-SCNC: 4.1 MMOL/L (ref 3.4–5.3)
PROT SERPL-MCNC: 7.1 G/DL (ref 6.8–8.8)
PSA SERPL-ACNC: 1.21 UG/L (ref 0–4)
RBC # BLD AUTO: 4.72 10E12/L (ref 4.4–5.9)
SODIUM SERPL-SCNC: 141 MMOL/L (ref 133–144)
TRIGL SERPL-MCNC: 153 MG/DL
WBC # BLD AUTO: 5.2 10E9/L (ref 4–11)

## 2017-07-24 PROCEDURE — 85027 COMPLETE CBC AUTOMATED: CPT | Performed by: INTERNAL MEDICINE

## 2017-07-24 PROCEDURE — 80061 LIPID PANEL: CPT | Performed by: INTERNAL MEDICINE

## 2017-07-24 PROCEDURE — G0103 PSA SCREENING: HCPCS | Performed by: INTERNAL MEDICINE

## 2017-07-24 PROCEDURE — 80053 COMPREHEN METABOLIC PANEL: CPT | Performed by: INTERNAL MEDICINE

## 2017-07-24 PROCEDURE — 36415 COLL VENOUS BLD VENIPUNCTURE: CPT | Performed by: INTERNAL MEDICINE

## 2017-07-25 NOTE — PATIENT INSTRUCTIONS
Preventive Health Recommendations  Male Ages 50 - 64    Yearly exam:             See your health care provider every year in order to  o   Review health changes.   o   Discuss preventive care.    o   Review your medicines if your doctor has prescribed any.     Have a cholesterol test every 5 years, or more frequently if you are at risk for high cholesterol/heart disease.     Have a diabetes test (fasting glucose) every three years. If you are at risk for diabetes, you should have this test more often.     Have a colonoscopy at age 50, or have a yearly FIT test (stool test). These exams will check for colon cancer.      Talk with your health care provider about whether or not a prostate cancer screening test (PSA) is right for you.    You should be tested each year for STDs (sexually transmitted diseases), if you re at risk.     Shots: Get a flu shot each year. Get a tetanus shot every 10 years.     Nutrition:    Eat at least 5 servings of fruits and vegetables daily.     Eat whole-grain bread, whole-wheat pasta and brown rice instead of white grains and rice.     Talk to your provider about Calcium and Vitamin D.     Lifestyle    Exercise for at least 150 minutes a week (30 minutes a day, 5 days a week). This will help you control your weight and prevent disease.     Limit alcohol to one drink per day.     No smoking.     Wear sunscreen to prevent skin cancer.     See your dentist every six months for an exam and cleaning.     See your eye doctor every 1 to 2 years.  (Z00.00) Routine general medical examination at a health care facility  (primary encounter diagnosis)  Comment: For routine exam, we did review labs as ordered, cholesterol, diabetes mellitus check, liver function, renal function, PSAWe will also update vaccination history.  Plan: ZOSTER VACC LIVE SUBQ NJX            (E78.5) Hyperlipidemia LDL goal <160  Comment: Refill atorvastatin   Plan: atorvastatin (LIPITOR) 20 MG tablet            (R73.01) IFG  (impaired fasting glucose)  Comment: Labs reviewed and stable  Plan:     (Z80.0) Family history of colon cancer  Comment: I will send message to Dr. Gaona  Plan:     (I10) Benign essential hypertension  Comment: blood pressure is well controlled with metoprolol  Plan: metoprolol (TOPROL-XL) 25 MG 24 hr tablet

## 2017-07-25 NOTE — PROGRESS NOTES
Nixon Hampton,    I had the opportunity to review your recent labs and a summary of your labs reads as follows:    Your complete blood counts show no sign of anemia, normal white blood cell count and platelets.  Your comprehensive metabolic panel showed normal renal function, normal liver function, and normal fasting blood glucose indicating no evidence of diabetes mellitus.  Your fasting lipid panel show  - normal HDL (good) cholesterol -as your goal is greater than 40  - low LDL (bad) cholesterol as your goal is less than 160  - stable triglyceride levels  Your PSA level is also stable indicating no evidence of prostate cancer       Congratulaions on your excellent results     Sincerely,  Mathew Ernst MD

## 2017-07-25 NOTE — PROGRESS NOTES
"  SUBJECTIVE:   CC: Memo Lai is an 62 year old male who presents for preventative health visit.     Healthy Habits:    Do you get at least three servings of calcium containing foods daily (dairy, green leafy vegetables, etc.)? {YES/NO, DAIRY INTAKE:553659::\"yes\"}    Amount of exercise or daily activities, outside of work: {AMOUNT EXERCISE:118962}    Problems taking medications regularly {Yes /No default:541355::\"No\"}    Medication side effects: {Yes /No default.:736411::\"No\"}    Have you had an eye exam in the past two years? {YESNOBLANK:970623}    Do you see a dentist twice per year? {YESNOBLANK:692429}    Do you have sleep apnea, excessive snoring or daytime drowsiness?{YESNOBLANK:678096}    {Outside tests to abstract? :658434}    {additional problems to add (Optional):814391}    Today's PHQ-2 Score:   PHQ-2 ( 1999 Pfizer) 7/23/2017 7/6/2016   Q1: Little interest or pleasure in doing things 0 0   Q2: Feeling down, depressed or hopeless 0 0   PHQ-2 Score 0 0   Q1: Little interest or pleasure in doing things Not at all -   Q2: Feeling down, depressed or hopeless Not at all -   PHQ-2 Score 0 -     {PHQ-2 LOOK IN ASSESSMENTS :534370}  Abuse: Current or Past(Physical, Sexual or Emotional)- {YES/NO/NA:408343}  Do you feel safe in your environment - {YES/NO/NA:971256}    Social History   Substance Use Topics     Smoking status: Former Smoker     Quit date: 8/12/1981     Smokeless tobacco: Never Used     Alcohol use 0.6 oz/week     0 Standard drinks or equivalent, 1 Shots of liquor per week      Comment: occas     {ETOH AUDIT:987927}    Last PSA:   PSA   Date Value Ref Range Status   07/24/2017 1.21 0 - 4 ug/L Final     Comment:     Assay Method:  Chemiluminescence using Siemens Vista analyzer       Reviewed orders with patient. Reviewed health maintenance and updated orders accordingly - {Yes/No:699267::\"Yes\"}  {Chronicprobdata (Optional):314576}    Reviewed and updated as needed this visit by clinical " "staff         Reviewed and updated as needed this visit by Provider        {HISTORY OPTIONS (Optional):115521}    ROS:  {MALE ROS-adult preventive care package:762431::\"C: NEGATIVE for fever, chills, change in weight\",\"I: NEGATIVE for worrisome rashes, moles or lesions\",\"E: NEGATIVE for vision changes or irritation\",\"ENT: NEGATIVE for ear, mouth and throat problems\",\"R: NEGATIVE for significant cough or SOB\",\"CV: NEGATIVE for chest pain, palpitations or peripheral edema\",\"GI: NEGATIVE for nausea, abdominal pain, heartburn, or change in bowel habits\",\" male: negative for dysuria, hematuria, decreased urinary stream, erectile dysfunction, urethral discharge\",\"M: NEGATIVE for significant arthralgias or myalgia\",\"N: NEGATIVE for weakness, dizziness or paresthesias\",\"P: NEGATIVE for changes in mood or affect\"}    OBJECTIVE:   There were no vitals taken for this visit.  EXAM:  {Exam Choices:979174}    ASSESSMENT/PLAN:   {Diag Picklist:761349}    COUNSELING:  {MALE COUNSELING MESSAGES:376122::\"Reviewed preventive health counseling, as reflected in patient instructions\"}    {BP Counseling- Complete if BP >= 120/80  (Optional):307433}     reports that he quit smoking about 35 years ago. He has never used smokeless tobacco.  {Tobacco Cessation -- Complete if patient is a smoker (Optional):479691}  Estimated body mass index is 29.82 kg/(m^2) as calculated from the following:    Height as of 2/16/17: 6' 4\" (1.93 m).    Weight as of 2/16/17: 245 lb (111.1 kg).   {Weight Management Plan (ACO) Complete if BMI is abnormal-  Ages 18-64  BMI >24.9.  Age 65+ with BMI <23 or >30 (Optional):048488}    Counseling Resources:  ATP IV Guidelines  Pooled Cohorts Equation Calculator  FRAX Risk Assessment  ICSI Preventive Guidelines  Dietary Guidelines for Americans, 2010  USDA's MyPlate  ASA Prophylaxis  Lung CA Screening    Mathew Ernst MD, MD  [facility]  "

## 2017-07-25 NOTE — PROGRESS NOTES
SUBJECTIVE:   CC: Memo Lai is an 62 year old male who presents for preventative health visit.     Physical   Annual:     Getting at least 3 servings of Calcium per day::  Yes    Bi-annual eye exam::  Yes    Dental care twice a year::  Yes    Sleep apnea or symptoms of sleep apnea::  None    Diet::  Regular (no restrictions)    Frequency of exercise::  None    Taking medications regularly::  Yes    Medication side effects::  None    Additional concerns today::  No      Today's PHQ-2 Score: PHQ-2 ( 1999 Pfizer) 7/23/2017   Q1: Little interest or pleasure in doing things 0   Q2: Feeling down, depressed or hopeless 0   PHQ-2 Score 0   Q1: Little interest or pleasure in doing things Not at all   Q2: Feeling down, depressed or hopeless Not at all   PHQ-2 Score 0       Abuse: Current or Past(Physical, Sexual or Emotional)- No  Do you feel safe in your environment - Yes    Social History   Substance Use Topics     Smoking status: Former Smoker     Quit date: 8/12/1981     Smokeless tobacco: Never Used     Alcohol use 0.6 oz/week     0 Standard drinks or equivalent, 1 Shots of liquor per week      Comment: occas     The patient does not drink >3 drinks per day nor >7 drinks per week.    Last PSA:   PSA   Date Value Ref Range Status   07/24/2017 1.21 0 - 4 ug/L Final     Comment:     Assay Method:  Chemiluminescence using Siemens Vista analyzer       Reviewed orders with patient. Reviewed health maintenance and updated orders accordingly - Yes  Labs reviewed in EPIC    Reviewed and updated as needed this visit by clinical staff         Reviewed and updated as needed this visit by Provider        Past Medical History:   Diagnosis Date     High cholesterol      Kidney stone      Ruptured eardrum 10/14/2013        ROS:  C: NEGATIVE for fever, chills, change in weight  I: NEGATIVE for worrisome rashes, moles or lesions  E: NEGATIVE for vision changes or irritation  ENT: NEGATIVE for ear, mouth and throat problems  R:  "NEGATIVE for significant cough or SOB  CV: NEGATIVE for chest pain, palpitations or peripheral edema  GI: NEGATIVE for nausea, abdominal pain, heartburn, or change in bowel habits   male: negative for dysuria, hematuria, decreased urinary stream, erectile dysfunction, urethral discharge - recent kidney stone in February  M: NEGATIVE for significant arthralgias or myalgia  N: NEGATIVE for weakness, dizziness or paresthesias  P: NEGATIVE for changes in mood or affect    OBJECTIVE:   /88  Pulse 69  Temp 98.2  F (36.8  C) (Oral)  Resp (!) 98  Ht 6' 4\" (1.93 m)  Wt 245 lb (111.1 kg)  BMI 29.82 kg/m2    EXAM:  GENERAL: healthy, alert and no distress  EYES: Eyes grossly normal to inspection, PERRL and conjunctivae and sclerae normal  HENT: ear canals and TM's normal, nose and mouth without ulcers or lesions  NECK: no adenopathy, no asymmetry, masses, or scars and thyroid normal to palpation  RESP: lungs clear to auscultation - no rales, rhonchi or wheezes  CV: regular rate and rhythm, normal S1 S2, no S3 or S4, no murmur, click or rub, no peripheral edema and peripheral pulses strong  ABDOMEN: soft, nontender, no hepatosplenomegaly, no masses and bowel sounds normal  RECTAL: normal sphincter tone, no rectal masses, prostate normal size, smooth, nontender without nodules or masses  MS: no gross musculoskeletal defects noted, no edema  SKIN: no suspicious lesions or rashes  NEURO: Normal strength and tone, mentation intact and speech normal  PSYCH: mentation appears normal, affect normal/bright    ASSESSMENT/PLAN:     (Z00.00) Routine general medical examination at a health care facility  (primary encounter diagnosis)  Comment: For routine exam, we did review labs as ordered, cholesterol, diabetes mellitus check, liver function, renal function, PSAWe will also update vaccination history.  Plan: ZOSTER VACC LIVE SUBQ NJX            (E78.5) Hyperlipidemia LDL goal <160  Comment: Refill atorvastatin   Plan: " "atorvastatin (LIPITOR) 20 MG tablet            (R73.01) IFG (impaired fasting glucose)  Comment: Labs reviewed and stable  Plan:     (Z80.0) Family history of colon cancer  Comment: I will send message to Dr. Gaona  Plan:     (I10) Benign essential hypertension  Comment: blood pressure is well controlled with metoprolol  Plan: metoprolol (TOPROL-XL) 25 MG 24 hr tablet                   COUNSELING:   Reviewed preventive health counseling, as reflected in patient instructions       reports that he quit smoking about 35 years ago. He has never used smokeless tobacco.      Estimated body mass index is 29.82 kg/(m^2) as calculated from the following:    Height as of 2/16/17: 6' 4\" (1.93 m).    Weight as of 2/16/17: 245 lb (111.1 kg).         Counseling Resources:  ATP IV Guidelines  Pooled Cohorts Equation Calculator  FRAX Risk Assessment  ICSI Preventive Guidelines  Dietary Guidelines for Americans, 2010  USDA's MyPlate  ASA Prophylaxis  Lung CA Screening    Mathew Ernst MD, MD  [facility]  "

## 2017-07-26 ENCOUNTER — OFFICE VISIT (OUTPATIENT)
Dept: FAMILY MEDICINE | Facility: CLINIC | Age: 62
End: 2017-07-26
Payer: COMMERCIAL

## 2017-07-26 ENCOUNTER — TELEPHONE (OUTPATIENT)
Dept: FAMILY MEDICINE | Facility: CLINIC | Age: 62
End: 2017-07-26

## 2017-07-26 VITALS
SYSTOLIC BLOOD PRESSURE: 135 MMHG | RESPIRATION RATE: 98 BRPM | TEMPERATURE: 98.2 F | WEIGHT: 245 LBS | HEART RATE: 69 BPM | BODY MASS INDEX: 29.83 KG/M2 | HEIGHT: 76 IN | DIASTOLIC BLOOD PRESSURE: 88 MMHG

## 2017-07-26 DIAGNOSIS — Z80.0 FAMILY HISTORY OF COLON CANCER: ICD-10-CM

## 2017-07-26 DIAGNOSIS — I10 BENIGN ESSENTIAL HYPERTENSION: ICD-10-CM

## 2017-07-26 DIAGNOSIS — E78.5 HYPERLIPIDEMIA LDL GOAL <160: ICD-10-CM

## 2017-07-26 DIAGNOSIS — R73.01 IFG (IMPAIRED FASTING GLUCOSE): ICD-10-CM

## 2017-07-26 DIAGNOSIS — Z00.00 ROUTINE GENERAL MEDICAL EXAMINATION AT A HEALTH CARE FACILITY: Primary | ICD-10-CM

## 2017-07-26 PROCEDURE — 99396 PREV VISIT EST AGE 40-64: CPT | Mod: 25 | Performed by: INTERNAL MEDICINE

## 2017-07-26 PROCEDURE — 90736 HZV VACCINE LIVE SUBQ: CPT | Performed by: INTERNAL MEDICINE

## 2017-07-26 PROCEDURE — 90471 IMMUNIZATION ADMIN: CPT | Performed by: INTERNAL MEDICINE

## 2017-07-26 RX ORDER — METOPROLOL SUCCINATE 25 MG/1
25 TABLET, EXTENDED RELEASE ORAL DAILY
Qty: 90 TABLET | Refills: 3 | Status: SHIPPED | OUTPATIENT
Start: 2017-07-26 | End: 2017-07-26

## 2017-07-26 RX ORDER — ATORVASTATIN CALCIUM 20 MG/1
20 TABLET, FILM COATED ORAL DAILY
Qty: 90 TABLET | Refills: 3 | Status: SHIPPED | OUTPATIENT
Start: 2017-07-26 | End: 2018-09-11

## 2017-07-26 RX ORDER — METOPROLOL SUCCINATE 25 MG/1
25 TABLET, EXTENDED RELEASE ORAL DAILY
Qty: 90 TABLET | Refills: 3 | Status: SHIPPED | OUTPATIENT
Start: 2017-07-26 | End: 2018-06-30

## 2017-07-26 NOTE — TELEPHONE ENCOUNTER
Can we call Memo Lai and let him know that   I spoke with Dr. Gaona in general surgery and      He would like to have him come back this year for a follow up given the size of the polyp.  I think we called and sent a letter but probably got lost in follow up.      Can we coordinate a follow up for Memo Lai for colonoscopy with Dr. Jimenez Ernst MD

## 2017-07-26 NOTE — TELEPHONE ENCOUNTER
Relayed information to patient and he is going to contact his insurance just to make sure he is ok with proceeding forward with Colonoscopy. Thanks us for f/u call.  Natividad Cai [facility]

## 2017-07-26 NOTE — NURSING NOTE
"Chief Complaint   Patient presents with     Physical       Initial /88  Pulse 69  Temp 98.2  F (36.8  C) (Oral)  Resp (!) 98  Ht 6' 4\" (1.93 m)  Wt 245 lb (111.1 kg)  BMI 29.82 kg/m2 Estimated body mass index is 29.82 kg/(m^2) as calculated from the following:    Height as of this encounter: 6' 4\" (1.93 m).    Weight as of this encounter: 245 lb (111.1 kg).  Medication Reconciliation: pilar ROUSE CMA      "

## 2017-07-26 NOTE — MR AVS SNAPSHOT
After Visit Summary   7/26/2017    Memo Lai    MRN: [id number]           Patient Information     Date Of Birth          1955        Visit Information        Provider Department      7/26/2017 7:30 AM Mathew Ernst MD [facility]        Today's Diagnoses     Routine general medical examination at a health care facility    -  1    Hyperlipidemia LDL goal <160        IFG (impaired fasting glucose)        Family history of colon cancer        Benign essential hypertension          Care Instructions      Preventive Health Recommendations  Male Ages 50 - 64    Yearly exam:             See your health care provider every year in order to  o   Review health changes.   o   Discuss preventive care.    o   Review your medicines if your doctor has prescribed any.     Have a cholesterol test every 5 years, or more frequently if you are at risk for high cholesterol/heart disease.     Have a diabetes test (fasting glucose) every three years. If you are at risk for diabetes, you should have this test more often.     Have a colonoscopy at age 50, or have a yearly FIT test (stool test). These exams will check for colon cancer.      Talk with your health care provider about whether or not a prostate cancer screening test (PSA) is right for you.    You should be tested each year for STDs (sexually transmitted diseases), if you re at risk.     Shots: Get a flu shot each year. Get a tetanus shot every 10 years.     Nutrition:    Eat at least 5 servings of fruits and vegetables daily.     Eat whole-grain bread, whole-wheat pasta and brown rice instead of white grains and rice.     Talk to your provider about Calcium and Vitamin D.     Lifestyle    Exercise for at least 150 minutes a week (30 minutes a day, 5 days a week). This will help you control your weight and prevent disease.     Limit alcohol to one drink per day.     No smoking.     Wear sunscreen to prevent skin cancer.     See  your dentist every six months for an exam and cleaning.     See your eye doctor every 1 to 2 years.  (Z00.00) Routine general medical examination at a health care facility  (primary encounter diagnosis)  Comment: For routine exam, we did review labs as ordered, cholesterol, diabetes mellitus check, liver function, renal function, PSAWe will also update vaccination history.  Plan: ZOSTER VACC LIVE SUBQ NJX            (E78.5) Hyperlipidemia LDL goal <160  Comment: Refill atorvastatin   Plan: atorvastatin (LIPITOR) 20 MG tablet            (R73.01) IFG (impaired fasting glucose)  Comment: Labs reviewed and stable  Plan:     (Z80.0) Family history of colon cancer  Comment: I will send message to Dr. Gaona  Plan:     (I10) Benign essential hypertension  Comment: blood pressure is well controlled with metoprolol  Plan: metoprolol (TOPROL-XL) 25 MG 24 hr tablet                     Follow-ups after your visit        Who to contact     If you have questions or need follow up information about today's clinic visit or your schedule please contact [facility] directly at [phone].  Normal or non-critical lab and imaging results will be communicated to you by [employer], letter or phone within 4 business days after the clinic has received the results. If you do not hear from us within 7 days, please contact the clinic through [employer] or phone. If you have a critical or abnormal lab result, we will notify you by phone as soon as possible.  Submit refill requests through [facility] or call your pharmacy and they will forward the refill request to us. Please allow 3 business days for your refill to be completed.          Additional Information About Your Visit        [employer] Information     [facility] gives you secure access to your electronic health record. If you see a primary care provider, you can also send messages to your care team and make appointments. If you have questions, please call your primary care clinic.  If  "you do not have a primary care provider, please call [phone] and they will assist you.        Care EveryWhere ID     This is your Care EveryWhere ID. This could be used by other organizations to access your [place] medical records  [id number]        Your Vitals Were     Pulse Temperature Respirations Height BMI (Body Mass Index)       69 98.2  F (36.8  C) (Oral) 98 6' 4\" (1.93 m) 29.82 kg/m2        Blood Pressure from Last 3 Encounters:   07/26/17 135/88   04/07/17 128/78   03/09/17 132/76    Weight from Last 3 Encounters:   07/26/17 245 lb (111.1 kg)   02/16/17 245 lb (111.1 kg)   02/14/17 249 lb (112.9 kg)              We Performed the Following     ZOSTER VACC LIVE SUBQ NJX          Today's Medication Changes          These changes are accurate as of: 7/26/17  7:53 AM.  If you have any questions, ask your nurse or doctor.               Stop taking these medicines if you haven't already. Please contact your care team if you have questions.     HYDROcodone-acetaminophen 5-325 MG per tablet   Commonly known as:  NORCO   Stopped by:  Mathew Ernst MD                Where to get your medicines      [facility] [address] [place] [address] [address] AT [address] [place] [address] & [address], [facility] [address]    Hours:  24-hours Phone:  [phone]     atorvastatin 20 MG tablet    metoprolol 25 MG 24 hr tablet                Primary Care Provider Office Phone # Fax #    Mathew Ernst [phone]       [facility] [address]  [place] [address]        Equal Access to Services     BROCK ZHENG AH: Joy Phillips, wamilla quiroz, qaangelata kaalmada michelle, aurelia arriaga. So [facility] [place] [facility] [phone].    ATENCIÓN: Si habla español, tiene a dunlap disposición servicios gratuitos de asistencia lingüística. Llame al [phone].    We comply with applicable federal civil rights laws " and [place] laws. We do not discriminate on the basis of race, color, national origin, age, disability sex, sexual orientation or gender identity.            Thank you!     Thank you for choosing [facility]  for your care. Our goal is always to provide you with excellent care. Hearing back from our patients is one way we can continue to improve our services. Please take a few minutes to complete the written survey that you may receive in the mail after your visit with us. Thank you!             Your Updated Medication List - Protect others around you: Learn how to safely use, store and throw away your medicines at www.disposemymeds.org.          This list is accurate as of: 7/26/17  7:53 AM.  Always use your most recent med list.                   Brand Name Dispense Instructions for use Diagnosis    aspirin 81 MG EC tablet     90 tablet    Take 1 tablet by mouth daily.        atorvastatin 20 MG tablet    LIPITOR    90 tablet    Take 1 tablet (20 mg) by mouth daily    Hyperlipidemia LDL goal <160       glucosamine-chondroitinoitin 6576-6672 MG/30ML Liqd      Take by mouth daily        metoprolol 25 MG 24 hr tablet    TOPROL-XL    90 tablet    Take 1 tablet (25 mg) by mouth daily    Benign essential hypertension       Multi-vitamin Tabs tablet     100 tablet    Take 1 tablet by mouth daily.

## 2017-09-21 DIAGNOSIS — E78.5 HYPERLIPIDEMIA LDL GOAL <160: ICD-10-CM

## 2017-09-21 RX ORDER — ATORVASTATIN CALCIUM 20 MG/1
TABLET, FILM COATED ORAL
Qty: 90 TABLET | Refills: 0 | OUTPATIENT
Start: 2017-09-21

## 2017-09-21 NOTE — TELEPHONE ENCOUNTER
atorvastatin (LIPITOR) 20 MG tablet       Last Written Prescription Date: 7/26/2017-PROFILE ONLY  Last Fill Quantity: 90, # refills: 3  Last Office Visit with [facility], [facility] or [facility] prescribing provider: 7/26/2017       Lab Results   Component Value Date    CHOL 183 07/24/2017     Lab Results   Component Value Date    HDL 52 07/24/2017     Lab Results   Component Value Date     07/24/2017     Lab Results   Component Value Date    TRIG 153 07/24/2017     Lab Results   Component Value Date    CHOLHDLRATIO 3.5 01/29/2015

## 2017-10-13 ENCOUNTER — SURGERY (OUTPATIENT)
Age: 62
End: 2017-10-13

## 2017-10-13 ENCOUNTER — HOSPITAL ENCOUNTER (OUTPATIENT)
Facility: CLINIC | Age: 62
Discharge: HOME | End: 2017-10-13
Attending: SURGERY | Admitting: SURGERY

## 2017-10-13 ENCOUNTER — APPOINTMENT (OUTPATIENT)
Dept: SURGERY | Facility: PHYSICIAN GROUP | Age: 62
End: 2017-10-13
Payer: COMMERCIAL

## 2017-10-13 VITALS
BODY MASS INDEX: 29.22 KG/M2 | SYSTOLIC BLOOD PRESSURE: 125 MMHG | RESPIRATION RATE: 9 BRPM | HEIGHT: 76 IN | DIASTOLIC BLOOD PRESSURE: 84 MMHG | OXYGEN SATURATION: 97 % | WEIGHT: 240 LBS

## 2017-10-13 LAB — COLONOSCOPY: NORMAL

## 2017-10-13 PROCEDURE — 25000128 H RX IP 250 OP 636: Performed by: SURGERY

## 2017-10-13 PROCEDURE — G0105 COLORECTAL SCRN; HI RISK IND: HCPCS | Performed by: SURGERY

## 2017-10-13 PROCEDURE — G0500 MOD SEDAT ENDO SERVICE >5YRS: HCPCS | Performed by: SURGERY

## 2017-10-13 PROCEDURE — 99152 MOD SED SAME PHYS/QHP 5/>YRS: CPT | Mod: PT | Performed by: SURGERY

## 2017-10-13 PROCEDURE — 45378 DIAGNOSTIC COLONOSCOPY: CPT | Performed by: SURGERY

## 2017-10-13 RX ORDER — LIDOCAINE 40 MG/G
CREAM TOPICAL
Status: DISCONTINUED | OUTPATIENT
Start: 2017-10-13 | End: 2017-10-13 | Stop reason: HOSPADM

## 2017-10-13 RX ORDER — FENTANYL CITRATE 50 UG/ML
INJECTION, SOLUTION INTRAMUSCULAR; INTRAVENOUS PRN
Status: DISCONTINUED | OUTPATIENT
Start: 2017-10-13 | End: 2017-10-13 | Stop reason: HOSPADM

## 2017-10-13 RX ORDER — ONDANSETRON 2 MG/ML
4 INJECTION INTRAMUSCULAR; INTRAVENOUS
Status: DISCONTINUED | OUTPATIENT
Start: 2017-10-13 | End: 2017-10-13 | Stop reason: HOSPADM

## 2017-10-13 RX ADMIN — MIDAZOLAM HYDROCHLORIDE 1 MG: 1 INJECTION, SOLUTION INTRAMUSCULAR; INTRAVENOUS at 09:02

## 2017-10-13 RX ADMIN — MIDAZOLAM HYDROCHLORIDE 2 MG: 1 INJECTION, SOLUTION INTRAMUSCULAR; INTRAVENOUS at 09:01

## 2017-10-13 RX ADMIN — FENTANYL CITRATE 100 MCG: 50 INJECTION, SOLUTION INTRAMUSCULAR; INTRAVENOUS at 08:59

## 2017-10-13 NOTE — DISCHARGE INSTRUCTIONS
Post Endoscopy Care  Activity  Do not drive for at least twenty-four (24) hours after the procedure. It takes at least that long for the medication to wear off completely. During this time, your judgment and reflexes will be impaired. Even though you may feel awake and alert, we suggest that you do not make important decisions during this time and remain off the job until the day following the procedure.   Diet  You may have a light meal following the examination and resume your regular diet later the same day or the following day.   Drink plenty of fluids. You may tend to be constipated for a day or so following the examination. If you tend to be constipated normally you should begin taking a stool softening agent immediately. NO LAXATIVES.   Side Effects    After the procedure, you may experience increased gas, bloating or cramping. Mild abdominal pain lasting 1-2 hours is common. Passing flatus (gas) and belching (burping) is encouraged.     A small amount of rectal bleeding may occur particularly if a polyp has been removed or a biopsy was taken.    If a polyp has been removed, do not take aspirin products or NSAIDS (such as Motrin or Advil) for seven (7) days unless given permission to do so. Tylenol is o.k.    If you experience severe abdominal pain, fevers >100.4, severe nausea/vomiting, continuous bleeding, passage of clots, or feeling weak (faint) immediately call [phone] or go to the emergency room for evaluation.      Follow Up  If a biopsy was done or any tissue was removed, we will send you a letter or call you with the result over the next few weeks. If you do not receive your results or would like to set up an appointment to discuss the findings please call: [facility] [phone].

## 2017-10-19 ENCOUNTER — TELEPHONE (OUTPATIENT)
Dept: OTHER | Facility: CLINIC | Age: 62
End: 2017-10-19

## 2017-12-01 ENCOUNTER — OFFICE VISIT (OUTPATIENT)
Dept: FAMILY MEDICINE | Facility: CLINIC | Age: 62
End: 2017-12-01
Payer: COMMERCIAL

## 2017-12-01 VITALS
OXYGEN SATURATION: 97 % | HEART RATE: 71 BPM | BODY MASS INDEX: 30.18 KG/M2 | DIASTOLIC BLOOD PRESSURE: 91 MMHG | TEMPERATURE: 97.2 F | SYSTOLIC BLOOD PRESSURE: 150 MMHG | WEIGHT: 247.9 LBS

## 2017-12-01 DIAGNOSIS — H72.91 PERFORATION OF TYMPANIC MEMBRANE, RIGHT: ICD-10-CM

## 2017-12-01 DIAGNOSIS — H92.11 OTORRHEA, RIGHT: Primary | ICD-10-CM

## 2017-12-01 PROCEDURE — 99213 OFFICE O/P EST LOW 20 MIN: CPT | Performed by: NURSE PRACTITIONER

## 2017-12-01 RX ORDER — NEOMYCIN SULFATE, POLYMYXIN B SULFATE AND HYDROCORTISONE 10; 3.5; 1 MG/ML; MG/ML; [USP'U]/ML
4 SUSPENSION/ DROPS AURICULAR (OTIC) 4 TIMES DAILY
Qty: 10 ML | Refills: 0 | Status: SHIPPED | OUTPATIENT
Start: 2017-12-01 | End: 2018-09-11

## 2017-12-01 NOTE — PROGRESS NOTES
SUBJECTIVE:   Memo Lai is a 62 year old male who presents to clinic today for the following health issues:      RESPIRATORY SYMPTOMS      Duration: x 2 days of mild resp congestion, and right ear purulent drainage.  He has 75% right TM perforation with some hearing loss . States every time he gets a cold he develops otorrhea and requires ear drops.  Reviewing transferred records he has also been given PCN oral antibiotics concurrently     Description  ear pain right, with wet purulent drainage    Severity: mild    Accompanying signs and symptoms: None    History (predisposing factors):  none    Precipitating or alleviating factors: None  Therapies tried and outcome:  q tips      Problem list and histories reviewed & adjusted, as indicated.  Additional history: as documented    Patient Active Problem List   Diagnosis     Hyperlipidemia LDL goal <160     IFG (impaired fasting glucose)     Low HDL (under 40)     Metabolic syndrome X     Hypertriglyceridemia     Family history of colon cancer     Ruptured eardrum     Past Surgical History:   Procedure Laterality Date     COLONOSCOPY  10/11/10    [facility]     COLONOSCOPY  09/25/06    [facility]     COLONOSCOPY N/A 8/12/2016    Procedure: COMBINED COLONOSCOPY, SINGLE OR MULTIPLE BIOPSY/POLYPECTOMY BY BIOPSY;  Surgeon: Jose Carlos Gaona MD;  Location:  GI     COLONOSCOPY N/A 10/13/2017    Procedure: COLONOSCOPY;  colonoscopy;  Surgeon: Jose Carlos Gaona MD;  Location:  GI     GENITOURINARY SURGERY      kidney stone     ORTHOPEDIC SURGERY      meniscus tear and repair right       Social History   Substance Use Topics     Smoking status: Former Smoker     Quit date: 8/12/1981     Smokeless tobacco: Never Used     Alcohol use 0.6 oz/week     0 Standard drinks or equivalent, 1 Shots of liquor per week      Comment: occas     Family History   Problem Relation Age of Onset     Colon Cancer Brother 45     Cardiovascular Father      Colon  Cancer Paternal Uncle          Current Outpatient Prescriptions   Medication Sig Dispense Refill     neomycin-polymyxin-hydrocortisone (CORTISPORIN) 3.5-47565-1 otic suspension Place 4 drops in ear(s) 4 times daily 10 mL 0     amoxicillin-clavulanate (AUGMENTIN) 875-125 MG per tablet Take 1 tablet by mouth 2 times daily 20 tablet 0     atorvastatin (LIPITOR) 20 MG tablet Take 1 tablet (20 mg) by mouth daily 90 tablet 3     metoprolol (TOPROL-XL) 25 MG 24 hr tablet Take 1 tablet (25 mg) by mouth daily 90 tablet 3     glucosamine-chondroitinoitin 2386-6316 MG/30ML LIQD Take by mouth daily       aspirin 81 MG EC tablet Take 1 tablet by mouth daily. 90 tablet 3     multivitamin, therapeutic with minerals (MULTI-VITAMIN) TABS Take 1 tablet by mouth daily. 100 tablet 3     Allergies   Allergen Reactions     Morphine      Nkda [No Known Drug Allergies]          Reviewed and updated as needed this visit by clinical staff     Reviewed and updated as needed this visit by Provider         ROS:  Constitutional, HEENT, cardiovascular, pulmonary, gi and gu systems are negative, except as otherwise noted.      OBJECTIVE:   BP (!) 150/91 (BP Location: Left arm, Patient Position: Chair, Cuff Size: Adult Large)  Pulse 71  Temp 97.2  F (36.2  C) (Oral)  Wt 247 lb 14.4 oz (112.4 kg)  SpO2 97%  BMI 30.18 kg/m2  Body mass index is 30.18 kg/(m^2).  GENERAL: healthy, alert and no distress  EYES: Eyes grossly normal to inspection, PERRL and conjunctivae and sclerae normal  HENT: right  ear canal has minimal dried purulent drainge on floor of canal,  and TM has large central perforation, no erythema noted,, nose and mouth without ulcers or lesions  NECK: no adenopathy, no asymmetry, masses,       Diagnostic Test Results:  none     ASSESSMENT/PLAN:       ICD-10-CM    1. Otorrhea, right H92.11 neomycin-polymyxin-hydrocortisone (CORTISPORIN) 3.5-37964-0 otic suspension     amoxicillin-clavulanate (AUGMENTIN) 875-125 MG per tablet   2.  Perforation of tympanic membrane, right H72.91    Has in past been given cortisporin otictopical and will avoid tobramycin to avoid hearing loss  Follow up ENT If not improving  Avoid use of q tips so as not to introduce bacteria and to prevent further introducing organism causing infection     KATELYN Nunez [facility]

## 2017-12-01 NOTE — NURSING NOTE
"Chief Complaint   Patient presents with     Ear Problem     x 2 days right       Initial BP (!) 150/91 (BP Location: Left arm, Patient Position: Chair, Cuff Size: Adult Large)  Pulse 71  Temp 97.2  F (36.2  C) (Oral)  Wt 247 lb 14.4 oz (112.4 kg)  SpO2 97%  BMI 30.18 kg/m2 Estimated body mass index is 30.18 kg/(m^2) as calculated from the following:    Height as of 10/13/17: 6' 4\" (1.93 m).    Weight as of this encounter: 247 lb 14.4 oz (112.4 kg).  Medication Reconciliation: complete     Aarti Maciel MA   "

## 2017-12-01 NOTE — MR AVS SNAPSHOT
After Visit Summary   12/1/2017    Memo Lai    MRN: [id number]           Patient Information     Date Of Birth          1955        Visit Information        Provider Department      12/1/2017 2:00 PM Leanna Bennett APRN CNP [facility]        Today's Diagnoses     Otorrhea, right    -  1    Perforation of tympanic membrane, right           Follow-ups after your visit        Follow-up notes from your care team     Return if symptoms worsen or fail to improve.      Who to contact     If you have questions or need follow up information about today's clinic visit or your schedule please contact [facility] directly at [phone].  Normal or non-critical lab and imaging results will be communicated to you by MyChart, letter or phone within 4 business days after the clinic has received the results. If you do not hear from us within 7 days, please contact the clinic through [employer] or phone. If you have a critical or abnormal lab result, we will notify you by phone as soon as possible.  Submit refill requests through [email or web address] or call your pharmacy and they will forward the refill request to us. Please allow 3 business days for your refill to be completed.          Additional Information About Your Visit        MyChart Information     [email or web address] gives you secure access to your electronic health record. If you see a primary care provider, you can also send messages to your care team and make appointments. If you have questions, please call your primary care clinic.  If you do not have a primary care provider, please call [phone] and they will assist you.        Care EveryWhere ID     This is your Care EveryWhere ID. This could be used by other organizations to access your [place] medical records  [id number]        Your Vitals Were     Pulse Temperature Pulse Oximetry BMI (Body Mass Index)          71 97.2  F (36.2  C) (Oral) 97% 30.18 kg/m2         Blood Pressure  from Last 3 Encounters:   12/01/17 (!) 150/91   10/13/17 125/84   07/26/17 135/88    Weight from Last 3 Encounters:   12/01/17 247 lb 14.4 oz (112.4 kg)   10/13/17 240 lb (108.9 kg)   07/26/17 245 lb (111.1 kg)              Today, you had the following     No orders found for display         Today's Medication Changes          These changes are accurate as of: 12/1/17  2:34 PM.  If you have any questions, ask your nurse or doctor.               Start taking these medicines.        Dose/Directions    amoxicillin-clavulanate 875-125 MG per tablet   Commonly known as:  AUGMENTIN   Used for:  Otorrhea, right   Started by:  Leanna Bennett APRN CNP        Dose:  1 tablet   Take 1 tablet by mouth 2 times daily   Quantity:  20 tablet   Refills:  0       neomycin-polymyxin-hydrocortisone 3.5-40311-3 otic suspension   Commonly known as:  CORTISPORIN   Used for:  Otorrhea, right   Started by:  Leanna Bennett APRN CNP        Dose:  4 drop   Place 4 drops in ear(s) 4 times daily   Quantity:  10 mL   Refills:  0            Where to get your medicines      These medications were sent to [facility] [address] [place] [address] - [address]  [address] [place] [address] [place] [address]    Hours:  24-hours Phone:  [phone]     amoxicillin-clavulanate 875-125 MG per tablet    neomycin-polymyxin-hydrocortisone 3.5-79002-7 otic suspension                Primary Care Provider Office Phone # Fax #    Mathew Ernst [phone] [address]  [facility] [address]        Equal Access to Services     [facility] MARCE AH: Joy Phillips, naga quiroz, gemma kaalmadelaney martinez, aurelia arriaga. [facility] [phone].    ATENCIÓN: Si habla español, tiene a dunlap disposición servicios gratuitos de asistencia lingüística. Llame al [phone].    We comply with applicable federal civil rights laws and [place] laws. We do not  discriminate on the basis of race, color, national origin, age, disability, sex, sexual orientation, or gender identity.            Thank you!     Thank you for choosing [facility]  for your care. Our goal is always to provide you with excellent care. Hearing back from our patients is one way we can continue to improve our services. Please take a few minutes to complete the written survey that you may receive in the mail after your visit with us. Thank you!             Your Updated Medication List - Protect others around you: Learn how to safely use, store and throw away your medicines at www.disposemymeds.org.          This list is accurate as of: 12/1/17  2:34 PM.  Always use your most recent med list.                   Brand Name Dispense Instructions for use Diagnosis    amoxicillin-clavulanate 875-125 MG per tablet    AUGMENTIN    20 tablet    Take 1 tablet by mouth 2 times daily    Otorrhea, right       aspirin 81 MG EC tablet     90 tablet    Take 1 tablet by mouth daily.        atorvastatin 20 MG tablet    LIPITOR    90 tablet    Take 1 tablet (20 mg) by mouth daily    Hyperlipidemia LDL goal <160       glucosamine-chondroitinoitin 2974-2614 MG/30ML Liqd      Take by mouth daily        metoprolol 25 MG 24 hr tablet    TOPROL-XL    90 tablet    Take 1 tablet (25 mg) by mouth daily    Benign essential hypertension       Multi-vitamin Tabs tablet     100 tablet    Take 1 tablet by mouth daily.        neomycin-polymyxin-hydrocortisone 3.5-92714-1 otic suspension    CORTISPORIN    10 mL    Place 4 drops in ear(s) 4 times daily    Otorrhea, right

## 2018-01-26 ENCOUNTER — ALLIED HEALTH/NURSE VISIT (OUTPATIENT)
Dept: FAMILY MEDICINE | Facility: CLINIC | Age: 63
End: 2018-01-26
Payer: COMMERCIAL

## 2018-01-26 VITALS — DIASTOLIC BLOOD PRESSURE: 82 MMHG | SYSTOLIC BLOOD PRESSURE: 136 MMHG

## 2018-01-26 DIAGNOSIS — Z01.30 BP CHECK: Primary | ICD-10-CM

## 2018-01-26 PROCEDURE — 99207 ZZC NO CHARGE NURSE ONLY: CPT | Performed by: INTERNAL MEDICINE

## 2018-01-26 NOTE — PROGRESS NOTES
Memo Lai is enrolled/participating in the retail pharmacy Blood Pressure Goals Achievement Program (BPGAP).  Memo Lai was evaluated at [facility] on January 26, 2018 at which time his blood pressure was:    BP Readings from Last 3 Encounters:   01/26/18 136/82   12/01/17 (!) 150/91   10/13/17 125/84     Reviewed lifestyle modifications for blood pressure control and reduction: including making healthy food choices, managing weight, getting regular exercise, smoking cessation, reducing alcohol consumption, monitoring blood pressure regularly.     Memo Lai is not experiencing symptoms.    Follow-Up: BP is at goal of < 150/90 mmHg (patient 60+ years of age without diabetes).  Recommended follow-up at pharmacy in 6 months.     Recommendation to Provider: bp f/u 6 months    Memo Lai was evaluated for enrollment into the PGEN study today.    Patient eligible for enrollment:  No  Patient interested in enrollment:  No    Completed by: Irlanda Luther, PharmD, [facility]     [facility]  [phone]

## 2018-01-26 NOTE — MR AVS SNAPSHOT
After Visit Summary   1/26/2018    Memo Lai    MRN: [id number]           Patient Information     Date Of Birth          1955        Visit Information        Provider Department      1/26/2018 11:41 AM [facility]        Today's Diagnoses     BP check    -  1       Follow-ups after your visit        Who to contact     If you have questions or need follow up information about today's clinic visit or your schedule please contact [facility] directly at [phone].  Normal or non-critical lab and imaging results will be communicated to you by [employer], letter or phone within 4 business days after the clinic has received the results. If you do not hear from us within 7 days, please contact the clinic through [employer] or phone. If you have a critical or abnormal lab result, we will notify you by phone as soon as possible.  Submit refill requests through [employer] or call your pharmacy and they will forward the refill request to us. Please allow 3 business days for your refill to be completed.          Additional Information About Your Visit        MyChart Information     [employer] gives you secure access to your electronic health record. If you see a primary care provider, you can also send messages to your care team and make appointments. If you have questions, please call your primary care clinic.  If you do not have a primary care provider, please call [phone] and they will assist you.        Care EveryWhere ID     This is your Care EveryWhere ID. This could be used by other organizations to access your [place] medical records  [id number]         Blood Pressure from Last 3 Encounters:   01/26/18 136/82   12/01/17 (!) 150/91   10/13/17 125/84    Weight from Last 3 Encounters:   12/01/17 247 lb 14.4 oz (112.4 kg)   10/13/17 240 lb (108.9 kg)   07/26/17 245 lb (111.1 kg)              Today, you had the following     No orders found for  display       Primary Care Provider Office Phone # Fax #    Mathew Ernst [phone] [address] [address]        Equal Access to Services     BROCK ZHENG : Hadii blanca ku hadkevino Soagustinali, waaxda luqadaha, qaybta kaalmada adeegyada, aurelia jimin hayaaangy cannonjoselo mosleyjohanne arriaga. So [facility] [phone].    ATENCIÓN: Si habla español, tiene a dunlap disposición servicios gratuitos de asistencia lingüística. Llame al [phone].    We comply with applicable federal civil rights laws and [place] laws. We do not discriminate on the basis of race, color, national origin, age, disability, sex, sexual orientation, or gender identity.            Thank you!     Thank you for choosing [facility]  for your care. Our goal is always to provide you with excellent care. Hearing back from our patients is one way we can continue to improve our services. Please take a few minutes to complete the written survey that you may receive in the mail after your visit with us. Thank you!             Your Updated Medication List - Protect others around you: Learn how to safely use, store and throw away your medicines at www.disposemymeds.org.          This list is accurate as of 1/26/18 11:43 AM.  Always use your most recent med list.                   Brand Name Dispense Instructions for use Diagnosis    amoxicillin-clavulanate 875-125 MG per tablet    AUGMENTIN    20 tablet    Take 1 tablet by mouth 2 times daily    Otorrhea, right       aspirin 81 MG EC tablet     90 tablet    Take 1 tablet by mouth daily.        atorvastatin 20 MG tablet    LIPITOR    90 tablet    Take 1 tablet (20 mg) by mouth daily    Hyperlipidemia LDL goal <160       glucosamine-chondroitinoitin 8529-2783 MG/30ML Liqd      Take by mouth daily        metoprolol succinate 25 MG 24 hr tablet    TOPROL-XL    90 tablet    Take 1 tablet (25 mg) by mouth daily    Benign essential hypertension       Multi-vitamin Tabs  tablet     100 tablet    Take 1 tablet by mouth daily.        neomycin-polymyxin-hydrocortisone 3.5-37460-2 otic suspension    CORTISPORIN    10 mL    Place 4 drops in ear(s) 4 times daily    Otorrhea, right

## 2018-06-30 DIAGNOSIS — I10 BENIGN ESSENTIAL HYPERTENSION: ICD-10-CM

## 2018-07-02 ENCOUNTER — TELEPHONE (OUTPATIENT)
Dept: FAMILY MEDICINE | Facility: CLINIC | Age: 63
End: 2018-07-02

## 2018-07-02 DIAGNOSIS — Z00.00 ROUTINE GENERAL MEDICAL EXAMINATION AT A HEALTH CARE FACILITY: Primary | ICD-10-CM

## 2018-07-02 RX ORDER — METOPROLOL SUCCINATE 25 MG/1
25 TABLET, EXTENDED RELEASE ORAL DAILY
Qty: 90 TABLET | Refills: 0 | Status: SHIPPED | OUTPATIENT
Start: 2018-07-02 | End: 2018-09-11

## 2018-07-02 NOTE — TELEPHONE ENCOUNTER
Reason for Call: Request for an order or referral:    Order or referral being requested:  Px labs    Date needed: as soon as possible    Has the patient been seen by the PCP for this problem? NO    Additional comments: pt needs px orders for 9/11 px . Pt will do labs 9/10    Phone number Patient can be reached at:  Home number on file [phone] (home)    Best Time:  any    Can we leave a detailed message on this number?  YES    Call taken on 7/2/2018 at 11:25 AM by Ti Arita

## 2018-07-02 NOTE — TELEPHONE ENCOUNTER
Prescription approved per [facility] Refill Protocol.  Lizz SON RN    Next 5 appointments (look out 90 days)     Sep 11, 2018  9:00 AM CDT   [facility]    [address] [facility] [address]   [phone]

## 2018-07-02 NOTE — TELEPHONE ENCOUNTER
"metoprolol (TOPROL-XL) 25 MG 24 hr tablet 90 tablet 3 7/26/2017         Last Written Prescription Date:  07/26/2017  Last Fill Quantity: 90,  # refills: 3   Last office visit: 12/1/2017 with prescribing provider:  Donald Newton 02/14/2017  Future Office Visit:  Called patient, scheduled an appointment on 09/11/2018    Requested Prescriptions   Pending Prescriptions Disp Refills     metoprolol succinate (TOPROL-XL) 25 MG 24 hr tablet 90 tablet 3     Sig: Take 1 tablet (25 mg) by mouth daily    Beta-Blockers Protocol Passed    6/30/2018 11:32 AM       Passed - Blood pressure under 140/90 in past 12 months    BP Readings from Last 3 Encounters:   01/26/18 136/82   12/01/17 (!) 150/91   10/13/17 125/84                Passed - Patient is age 6 or older       Passed - Recent (12 mo) or future (30 days) visit within the authorizing provider's specialty    Patient had office visit in the last 12 months or has a visit in the next 30 days with authorizing provider or within the authorizing provider's specialty.  See \"Patient Info\" tab in inbasket, or \"Choose Columns\" in Meds & Orders section of the refill encounter.              "

## 2018-09-10 DIAGNOSIS — Z00.00 ROUTINE GENERAL MEDICAL EXAMINATION AT A HEALTH CARE FACILITY: ICD-10-CM

## 2018-09-10 DIAGNOSIS — Z12.5 SCREENING FOR PROSTATE CANCER: ICD-10-CM

## 2018-09-10 LAB
ALBUMIN SERPL-MCNC: 3.7 G/DL (ref 3.4–5)
ALBUMIN UR-MCNC: NEGATIVE MG/DL
ALP SERPL-CCNC: 81 U/L (ref 40–150)
ALT SERPL W P-5'-P-CCNC: 56 U/L (ref 0–70)
ANION GAP SERPL CALCULATED.3IONS-SCNC: 7 MMOL/L (ref 3–14)
APPEARANCE UR: CLEAR
AST SERPL W P-5'-P-CCNC: 34 U/L (ref 0–45)
BACTERIA #/AREA URNS HPF: ABNORMAL /HPF
BILIRUB SERPL-MCNC: 0.5 MG/DL (ref 0.2–1.3)
BILIRUB UR QL STRIP: NEGATIVE
BUN SERPL-MCNC: 16 MG/DL (ref 7–30)
CALCIUM SERPL-MCNC: 8.6 MG/DL (ref 8.5–10.1)
CHLORIDE SERPL-SCNC: 109 MMOL/L (ref 94–109)
CHOLEST SERPL-MCNC: 178 MG/DL
CO2 SERPL-SCNC: 25 MMOL/L (ref 20–32)
COLOR UR AUTO: YELLOW
CREAT SERPL-MCNC: 0.82 MG/DL (ref 0.66–1.25)
ERYTHROCYTE [DISTWIDTH] IN BLOOD BY AUTOMATED COUNT: 14.4 % (ref 10–15)
GFR SERPL CREATININE-BSD FRML MDRD: >90 ML/MIN/1.7M2
GLUCOSE SERPL-MCNC: 95 MG/DL (ref 70–99)
GLUCOSE UR STRIP-MCNC: NEGATIVE MG/DL
HBA1C MFR BLD: 5.4 % (ref 0–5.6)
HCT VFR BLD AUTO: 46.4 % (ref 40–53)
HDLC SERPL-MCNC: 48 MG/DL
HGB BLD-MCNC: 15.9 G/DL (ref 13.3–17.7)
HGB UR QL STRIP: ABNORMAL
KETONES UR STRIP-MCNC: NEGATIVE MG/DL
LDLC SERPL CALC-MCNC: 101 MG/DL
LEUKOCYTE ESTERASE UR QL STRIP: NEGATIVE
MCH RBC QN AUTO: 32 PG (ref 26.5–33)
MCHC RBC AUTO-ENTMCNC: 34.3 G/DL (ref 31.5–36.5)
MCV RBC AUTO: 93 FL (ref 78–100)
NITRATE UR QL: NEGATIVE
NONHDLC SERPL-MCNC: 130 MG/DL
PH UR STRIP: 6.5 PH (ref 5–7)
PLATELET # BLD AUTO: 213 10E9/L (ref 150–450)
POTASSIUM SERPL-SCNC: 4 MMOL/L (ref 3.4–5.3)
PROT SERPL-MCNC: 7.1 G/DL (ref 6.8–8.8)
PSA SERPL-ACNC: 1.41 UG/L (ref 0–4)
RBC # BLD AUTO: 4.97 10E12/L (ref 4.4–5.9)
RBC #/AREA URNS AUTO: ABNORMAL /HPF
SODIUM SERPL-SCNC: 141 MMOL/L (ref 133–144)
SOURCE: ABNORMAL
SP GR UR STRIP: 1.01 (ref 1–1.03)
TRIGL SERPL-MCNC: 144 MG/DL
UROBILINOGEN UR STRIP-ACNC: 0.2 EU/DL (ref 0.2–1)
WBC # BLD AUTO: 4.8 10E9/L (ref 4–11)
WBC #/AREA URNS AUTO: ABNORMAL /HPF

## 2018-09-10 PROCEDURE — 85027 COMPLETE CBC AUTOMATED: CPT | Performed by: INTERNAL MEDICINE

## 2018-09-10 PROCEDURE — 36415 COLL VENOUS BLD VENIPUNCTURE: CPT | Performed by: INTERNAL MEDICINE

## 2018-09-10 PROCEDURE — 83036 HEMOGLOBIN GLYCOSYLATED A1C: CPT | Performed by: INTERNAL MEDICINE

## 2018-09-10 PROCEDURE — 80053 COMPREHEN METABOLIC PANEL: CPT | Performed by: INTERNAL MEDICINE

## 2018-09-10 PROCEDURE — G0103 PSA SCREENING: HCPCS | Performed by: INTERNAL MEDICINE

## 2018-09-10 PROCEDURE — 80061 LIPID PANEL: CPT | Performed by: INTERNAL MEDICINE

## 2018-09-10 PROCEDURE — 81001 URINALYSIS AUTO W/SCOPE: CPT | Performed by: INTERNAL MEDICINE

## 2018-09-11 ENCOUNTER — OFFICE VISIT (OUTPATIENT)
Dept: FAMILY MEDICINE | Facility: CLINIC | Age: 63
End: 2018-09-11
Payer: COMMERCIAL

## 2018-09-11 VITALS
WEIGHT: 234 LBS | BODY MASS INDEX: 28.49 KG/M2 | HEART RATE: 71 BPM | SYSTOLIC BLOOD PRESSURE: 126 MMHG | TEMPERATURE: 97.7 F | DIASTOLIC BLOOD PRESSURE: 81 MMHG | OXYGEN SATURATION: 98 % | HEIGHT: 76 IN

## 2018-09-11 DIAGNOSIS — E78.5 HYPERLIPIDEMIA LDL GOAL <160: ICD-10-CM

## 2018-09-11 DIAGNOSIS — I10 BENIGN ESSENTIAL HYPERTENSION: ICD-10-CM

## 2018-09-11 DIAGNOSIS — Z00.00 ROUTINE GENERAL MEDICAL EXAMINATION AT A HEALTH CARE FACILITY: Primary | ICD-10-CM

## 2018-09-11 DIAGNOSIS — R73.01 IFG (IMPAIRED FASTING GLUCOSE): ICD-10-CM

## 2018-09-11 DIAGNOSIS — K42.9 UMBILICAL HERNIA WITHOUT OBSTRUCTION AND WITHOUT GANGRENE: ICD-10-CM

## 2018-09-11 PROCEDURE — 99396 PREV VISIT EST AGE 40-64: CPT | Performed by: INTERNAL MEDICINE

## 2018-09-11 RX ORDER — METOPROLOL SUCCINATE 25 MG/1
25 TABLET, EXTENDED RELEASE ORAL DAILY
Qty: 90 TABLET | Refills: 3 | Status: SHIPPED | OUTPATIENT
Start: 2018-09-11 | End: 2019-09-25

## 2018-09-11 RX ORDER — ATORVASTATIN CALCIUM 20 MG/1
20 TABLET, FILM COATED ORAL DAILY
Qty: 90 TABLET | Refills: 3 | Status: SHIPPED | OUTPATIENT
Start: 2018-09-11 | End: 2019-09-25

## 2018-09-11 NOTE — PROGRESS NOTES
SUBJECTIVE:   CC: Memo Lai is an 63 year old male who presents for preventative health visit.     Physical   Annual:     Getting at least 3 servings of Calcium per day:  NO    Bi-annual eye exam:  Yes    Dental care twice a year:  Yes    Sleep apnea or symptoms of sleep apnea:  None    Diet:  Regular (no restrictions)    Frequency of exercise:  2-3 days/week    Duration of exercise:  15-30 minutes    Taking medications regularly:  Yes    Medication side effects:  None    Additional concerns today:  No      Today's PHQ-2 Score:   PHQ-2 ( 1999 Pfizer) 9/10/2018   Q1: Little interest or pleasure in doing things 0   Q2: Feeling down, depressed or hopeless 0   PHQ-2 Score 0   Q1: Little interest or pleasure in doing things Not at all   Q2: Feeling down, depressed or hopeless Not at all   PHQ-2 Score 0       Abuse: Current or Past(Physical, Sexual or Emotional)- No  Do you feel safe in your environment - Yes    Social History   Substance Use Topics     Smoking status: Former Smoker     Quit date: 8/12/1981     Smokeless tobacco: Never Used     Alcohol use 0.6 oz/week     0 Standard drinks or equivalent, 1 Shots of liquor per week      Comment: occas     Alcohol Use 9/10/2018   If you drink alcohol do you typically have greater than 3 drinks per day OR greater than 7 drinks per week? Yes   AUDIT SCORE  4     AUDIT - Alcohol Use Disorders Identification Test - Reproduced from the World Health Organization Audit 2001 (Second Edition) 9/10/2018   1.  How often do you have a drink containing alcohol? 4 or more times a week   2.  How many drinks containing alcohol do you have on a typical day when you are drinking? 1 or 2   3.  How often do you have five or more drinks on one occasion? Never   4.  How often during the last year have you found that you were not able to stop drinking once you had started? Never   5.  How often during the last year have you failed to do what was normally expected of you because of  drinking? Never   6.  How often during the last year have you needed a first drink in the morning to get yourself going after a heavy drinking session? Never   7.  How often during the last year have you had a feeling of guilt or remorse after drinking? Never   8.  How often during the last year have you been unable to remember what happened the night before because of your drinking? Never   9.  Have you or someone else been injured because of your drinking? No   10. Has a relative, friend, doctor or other health care worker been concerned about your drinking or suggested you cut down? No   TOTAL SCORE 4       Last PSA:   PSA   Date Value Ref Range Status   09/10/2018 1.41 0 - 4 ug/L Final     Comment:     Assay Method:  Chemiluminescence using Siemens Vista analyzer       Reviewed orders with patient. Reviewed health maintenance and updated orders accordingly - Yes  Labs reviewed in EPIC    Reviewed and updated as needed this visit by clinical staff         Reviewed and updated as needed this visit by Provider        Past Medical History:   Diagnosis Date     Colon polyp      Family history of colon cancer      High cholesterol      Hypertension      Kidney stone      Ruptured eardrum 10/14/2013        Review of Systems  CONSTITUTIONAL: NEGATIVE for fever, chills, change in weight  INTEGUMENTARY/SKIN: NEGATIVE for worrisome rashes, moles or lesions  EYES: NEGATIVE for vision changes or irritation  ENT: NEGATIVE for ear, mouth and throat problems  RESP: NEGATIVE for significant cough or SOB  CV: NEGATIVE for chest pain, palpitations or peripheral edema  GI: NEGATIVE for nausea, abdominal pain, heartburn, or change in bowel habits   male: negative for dysuria, hematuria, decreased urinary stream, erectile dysfunction, urethral discharge  MUSCULOSKELETAL: NEGATIVE for significant arthralgias or myalgia  NEURO: NEGATIVE for weakness, dizziness or paresthesias  PSYCHIATRIC: NEGATIVE for changes in mood or  "affect    OBJECTIVE:   /81 (BP Location: Left arm, Patient Position: Chair, Cuff Size: Adult Large)  Pulse 71  Temp 97.7  F (36.5  C) (Oral)  Ht 6' 4\" (1.93 m)  Wt 234 lb (106.1 kg)  SpO2 98%  BMI 28.48 kg/m2    Physical Exam  GENERAL: healthy, alert and no distress  EYES: Eyes grossly normal to inspection, PERRL and conjunctivae and sclerae normal  HENT: ear canals and TM's normal, nose and mouth without ulcers or lesions  NECK: no adenopathy, no asymmetry, masses, or scars and thyroid normal to palpation  RESP: lungs clear to auscultation - no rales, rhonchi or wheezes  CV: regular rate and rhythm, normal S1 S2, no S3 or S4, no murmur, click or rub, no peripheral edema and peripheral pulses strong  ABDOMEN: soft, nontender, no hepatosplenomegaly, no masses and bowel sounds normal  RECTAL: normal sphincter tone, no rectal masses and prostate of normal size for age, smooth, nontender without masses/nodules  MS: no gross musculoskeletal defects noted, no edema  SKIN: no suspicious lesions or rashes  NEURO: Normal strength and tone, mentation intact and speech normal  PSYCH: mentation appears normal, affect normal/bright    Diagnostic Test Results:  none     ASSESSMENT/PLAN:   (Z00.00) Routine general medical examination at a health care facility  (primary encounter diagnosis)  Comment: For routine exam, we reviewed labs as ordered, cholesterol, diabetes mellitus check, liver function, renal function, PSA.  We will also update vaccination history.  Plan:     (E78.5) Hyperlipidemia LDL goal <160  Comment: checked fasting lipid panel and was excellent  Plan: atorvastatin (LIPITOR) 20 MG tablet            (I10) Benign essential hypertension  Comment: blood pressure is well controlled   Plan: metoprolol succinate (TOPROL-XL) 25 MG 24 hr         tablet            (R73.01) IFG (impaired fasting glucose)  Comment: checked hemoglobin A1c and was stable  Plan:     Shingrix vaccine is now available.  I would call " "your insurance to see if a shingles vaccine is covered and get this at your pharmacy              COUNSELING:   Reviewed preventive health counseling, as reflected in patient instructions    BP Readings from Last 1 Encounters:   01/26/18 136/82     Estimated body mass index is 28.48 kg/(m^2) as calculated from the following:    Height as of this encounter: 6' 4\" (1.93 m).    Weight as of this encounter: 234 lb (106.1 kg).           reports that he quit smoking about 37 years ago. He has never used smokeless tobacco.      Counseling Resources:  ATP IV Guidelines  Pooled Cohorts Equation Calculator  FRAX Risk Assessment  ICSI Preventive Guidelines  Dietary Guidelines for Americans, 2010  USDA's MyPlate  ASA Prophylaxis  Lung CA Screening    Mathew Ernst MD, MD  [facility]  "

## 2018-09-11 NOTE — PROGRESS NOTES
"  SUBJECTIVE:   CC: Memo Lai is an 63 year old male who presents for preventative health visit.     Healthy Habits:    Do you get at least three servings of calcium containing foods daily (dairy, green leafy vegetables, etc.)? {YES/NO, DAIRY INTAKE:324393::\"yes\"}    Amount of exercise or daily activities, outside of work: {AMOUNT EXERCISE:225887}    Problems taking medications regularly {Yes /No default:652591::\"No\"}    Medication side effects: {Yes /No default.:778804::\"No\"}    Have you had an eye exam in the past two years? {YESNOBLANK:539813}    Do you see a dentist twice per year? {YESNOBLANK:995273}    Do you have sleep apnea, excessive snoring or daytime drowsiness?{YESNOBLANK:842679}  {Outside tests to abstract? :572714}     {additional problems to add (Optional):973415}    Today's PHQ-2 Score:   PHQ-2 ( 1999 [employer]) 9/10/2018 7/26/2017   Q1: Little interest or pleasure in doing things 0 0   Q2: Feeling down, depressed or hopeless 0 0   PHQ-2 Score 0 0   Q1: Little interest or pleasure in doing things Not at all -   Q2: Feeling down, depressed or hopeless Not at all -   PHQ-2 Score 0 -     {PHQ-2 LOOK IN ASSESSMENTS :401158}  Abuse: Current or Past(Physical, Sexual or Emotional)- {YES/NO/NA:610006}  Do you feel safe in your environment - {YES/NO/NA:446068}    Social History   Substance Use Topics     Smoking status: Former Smoker     Quit date: 8/12/1981     Smokeless tobacco: Never Used     Alcohol use 0.6 oz/week     0 Standard drinks or equivalent, 1 Shots of liquor per week      Comment: occas      If you drink alcohol do you typically have >3 drinks per day or >7 drinks per week? {ETOH :617802}                      Last PSA:   PSA   Date Value Ref Range Status   09/10/2018 1.41 0 - 4 ug/L Final     Comment:     Assay Method:  Chemiluminescence using Siemens Vista analyzer       Reviewed orders with patient. Reviewed health maintenance and updated orders accordingly - " "{Yes/No:394159::\"Yes\"}  {Chronicprobdata (Optional):775548}    Reviewed and updated as needed this visit by clinical staff         Reviewed and updated as needed this visit by Provider        {HISTORY OPTIONS (Optional):549569}    ROS:  { :481031::\"CONSTITUTIONAL: NEGATIVE for fever, chills, change in weight\",\"INTEGUMENTARY/SKIN: NEGATIVE for worrisome rashes, moles or lesions\",\"EYES: NEGATIVE for vision changes or irritation\",\"ENT: NEGATIVE for ear, mouth and throat problems\",\"RESP: NEGATIVE for significant cough or SOB\",\"CV: NEGATIVE for chest pain, palpitations or peripheral edema\",\"GI: NEGATIVE for nausea, abdominal pain, heartburn, or change in bowel habits\",\" male: negative for dysuria, hematuria, decreased urinary stream, erectile dysfunction, urethral discharge\",\"MUSCULOSKELETAL: NEGATIVE for significant arthralgias or myalgia\",\"NEURO: NEGATIVE for weakness, dizziness or paresthesias\",\"PSYCHIATRIC: NEGATIVE for changes in mood or affect\"}    OBJECTIVE:   There were no vitals taken for this visit.  EXAM:  {Exam Choices:174031}    {Diagnostic Test Results (Optional):027140::\"Diagnostic Test Results:\",\"none \"}    ASSESSMENT/PLAN:   {Diag Picklist:035711}    COUNSELING:  {MALE COUNSELING MESSAGES:301226::\"Reviewed preventive health counseling, as reflected in patient instructions\"}    BP Readings from Last 1 Encounters:   01/26/18 136/82     Estimated body mass index is 30.18 kg/(m^2) as calculated from the following:    Height as of 10/13/17: 6' 4\" (1.93 m).    Weight as of 12/1/17: 247 lb 14.4 oz (112.4 kg).    {BP Counseling- Complete if BP >= 120/80  (Optional):697504}  {Weight Management Plan (ACO) Complete if BMI is abnormal-  Ages 18-64  BMI >24.9.  Age 65+ with BMI <23 or >30 (Optional):914422}     reports that he quit smoking about 37 years ago. He has never used smokeless tobacco.  {Tobacco Cessation -- Complete if patient is a smoker (Optional):896827}    Counseling Resources:  ATP IV " Guidelines  Pooled Cohorts Equation Calculator  FRAX Risk Assessment  ICSI Preventive Guidelines  Dietary Guidelines for Americans, 2010  [employer]s MyPlate  ASA Prophylaxis  Lung CA Screening    Mtahew Ernst MD, MD  [facility]

## 2018-09-11 NOTE — MR AVS SNAPSHOT
After Visit Summary   9/11/2018    Memo Lai    MRN: [id number]           Patient Information     Date Of Birth          1955        Visit Information        Provider Department      9/11/2018 9:00 AM Mathew Ernst MD [facility]        Today's Diagnoses     Routine general medical examination at a health care facility    -  1    Hyperlipidemia LDL goal <160        Benign essential hypertension        IFG (impaired fasting glucose)          Care Instructions      Preventive Health Recommendations  Male Ages 50 - 64    Yearly exam:             See your health care provider every year in order to  o   Review health changes.   o   Discuss preventive care.    o   Review your medicines if your doctor has prescribed any.     Have a cholesterol test every 5 years, or more frequently if you are at risk for high cholesterol/heart disease.     Have a diabetes test (fasting glucose) every three years. If you are at risk for diabetes, you should have this test more often.     Have a colonoscopy at age 50, or have a yearly FIT test (stool test). These exams will check for colon cancer.      Talk with your health care provider about whether or not a prostate cancer screening test (PSA) is right for you.    You should be tested each year for STDs (sexually transmitted diseases), if you re at risk.     Shots: Get a flu shot each year. Get a tetanus shot every 10 years.     Nutrition:    Eat at least 5 servings of fruits and vegetables daily.     Eat whole-grain bread, whole-wheat pasta and brown rice instead of white grains and rice.     Get adequate Calcium and Vitamin D.     Lifestyle    Exercise for at least 150 minutes a week (30 minutes a day, 5 days a week). This will help you control your weight and prevent disease.     Limit alcohol to one drink per day.     No smoking.     Wear sunscreen to prevent skin cancer.     See your dentist every six months for an exam and  cleaning.     See your eye doctor every 1 to 2 years.    (Z00.00) Routine general medical examination at a health care facility  (primary encounter diagnosis)  Comment: For routine exam, we reviewed labs as ordered, cholesterol, diabetes mellitus check, liver function, renal function, PSA.  We will also update vaccination history.  Plan:     (E78.5) Hyperlipidemia LDL goal <160  Comment: checked fasting lipid panel and was excellent  Plan: atorvastatin (LIPITOR) 20 MG tablet            (I10) Benign essential hypertension  Comment: blood pressure is well controlled   Plan: metoprolol succinate (TOPROL-XL) 25 MG 24 hr         tablet            (R73.01) IFG (impaired fasting glucose)  Comment: checked hemoglobin A1c and was stable  Plan:     Shingrix vaccine is now available.  I would call your insurance to see if a shingles vaccine is covered and get this at your pharmacy                Follow-ups after your visit        Who to contact     If you have questions or need follow up information about today's clinic visit or your schedule please contact [facility] directly at [phone].  Normal or non-critical lab and imaging results will be communicated to you by LemonQuesthart, letter or phone within 4 business days after the clinic has received the results. If you do not hear from us within 7 days, please contact the clinic through iWardat or phone. If you have a critical or abnormal lab result, we will notify you by phone as soon as possible.  Submit refill requests through [employer] or call your pharmacy and they will forward the refill request to us. Please allow 3 business days for your refill to be completed.          Additional Information About Your Visit        [employer] Information     [employer] gives you secure access to your electronic health record. If you see a primary care provider, you can also send messages to your care team and make appointments. If you have questions, please call your primary care  "clinic.  If you do not have a primary care provider, please call [phone] and they will assist you.        Care EveryWhere ID     This is your Care EveryWhere ID. This could be used by other organizations to access your [place] medical records  [id number]        Your Vitals Were     Pulse Temperature Height Pulse Oximetry BMI (Body Mass Index)       71 97.7  F (36.5  C) (Oral) 6' 4\" (1.93 m) 98% 28.48 kg/m2        Blood Pressure from Last 3 Encounters:   09/11/18 126/81   01/26/18 136/82   12/01/17 (!) 150/91    Weight from Last 3 Encounters:   09/11/18 234 lb (106.1 kg)   12/01/17 247 lb 14.4 oz (112.4 kg)   10/13/17 240 lb (108.9 kg)              Today, you had the following     No orders found for display         Today's Medication Changes          These changes are accurate as of 9/11/18  9:17 AM.  If you have any questions, ask your nurse or doctor.               Stop taking these medicines if you haven't already. Please contact your care team if you have questions.     amoxicillin-clavulanate 875-125 MG per tablet   Commonly known as:  AUGMENTIN   Stopped by:  Mathew Ernst MD           neomycin-polymyxin-hydrocortisone [id number] otic suspension   Commonly known as:  CORTISPORIN   Stopped by:  Mathew Ernst MD                Where to get your medicines      These medications were sent to [email or web address] [address] [facility], [place]  [address] [facility] [address] [facility] [address] [facility]  [address], [place] [address]     Phone:  [phone]     atorvastatin 20 MG tablet    metoprolol succinate 25 MG 24 hr tablet                Primary Care Provider Office Phone # Fax #    Mathew Ernst [phone] [address]  [place] [address]        Equal Access to Services     BROCK ZHENG AH: Hadii aad ku hadasho Soomaali, waaxda luqadaha, qaybta kaalbong martinez, aurelia arriaga. So [facility] " [phone].    ATENCIÓN: Si nory austin, tiene a dunlap disposición servicios gratuitos de asistencia lingüística. Kun ch [phone].    We comply with applicable federal civil rights laws and [place] laws. We do not discriminate on the basis of race, color, national origin, age, disability, sex, sexual orientation, or gender identity.            Thank you!     Thank you for choosing [facility]  for your care. Our goal is always to provide you with excellent care. Hearing back from our patients is one way we can continue to improve our services. Please take a few minutes to complete the written survey that you may receive in the mail after your visit with us. Thank you!             Your Updated Medication List - Protect others around you: Learn how to safely use, store and throw away your medicines at www.disposemymeds.org.          This list is accurate as of 9/11/18  9:17 AM.  Always use your most recent med list.                   Brand Name Dispense Instructions for use Diagnosis    aspirin 81 MG EC tablet     90 tablet    Take 1 tablet by mouth daily.        atorvastatin 20 MG tablet    LIPITOR    90 tablet    Take 1 tablet (20 mg) by mouth daily    Hyperlipidemia LDL goal <160       glucosamine-chondroitinoitin 6255-7152 MG/30ML Liqd      Take by mouth daily        metoprolol succinate 25 MG 24 hr tablet    TOPROL-XL    90 tablet    Take 1 tablet (25 mg) by mouth daily    Benign essential hypertension       Multi-vitamin Tabs tablet     100 tablet    Take 1 tablet by mouth daily.

## 2018-09-11 NOTE — PATIENT INSTRUCTIONS
Preventive Health Recommendations  Male Ages 50 - 64    Yearly exam:             See your health care provider every year in order to  o   Review health changes.   o   Discuss preventive care.    o   Review your medicines if your doctor has prescribed any.     Have a cholesterol test every 5 years, or more frequently if you are at risk for high cholesterol/heart disease.     Have a diabetes test (fasting glucose) every three years. If you are at risk for diabetes, you should have this test more often.     Have a colonoscopy at age 50, or have a yearly FIT test (stool test). These exams will check for colon cancer.      Talk with your health care provider about whether or not a prostate cancer screening test (PSA) is right for you.    You should be tested each year for STDs (sexually transmitted diseases), if you re at risk.     Shots: Get a flu shot each year. Get a tetanus shot every 10 years.     Nutrition:    Eat at least 5 servings of fruits and vegetables daily.     Eat whole-grain bread, whole-wheat pasta and brown rice instead of white grains and rice.     Get adequate Calcium and Vitamin D.     Lifestyle    Exercise for at least 150 minutes a week (30 minutes a day, 5 days a week). This will help you control your weight and prevent disease.     Limit alcohol to one drink per day.     No smoking.     Wear sunscreen to prevent skin cancer.     See your dentist every six months for an exam and cleaning.     See your eye doctor every 1 to 2 years.    (Z00.00) Routine general medical examination at a health care facility  (primary encounter diagnosis)  Comment: For routine exam, we reviewed labs as ordered, cholesterol, diabetes mellitus check, liver function, renal function, PSA.  We will also update vaccination history.  Plan:     (E78.5) Hyperlipidemia LDL goal <160  Comment: checked fasting lipid panel and was excellent  Plan: atorvastatin (LIPITOR) 20 MG tablet            (I10) Benign essential  hypertension  Comment: blood pressure is well controlled   Plan: metoprolol succinate (TOPROL-XL) 25 MG 24 hr         tablet            (R73.01) IFG (impaired fasting glucose)  Comment: checked hemoglobin A1c and was stable  Plan:     Shingrix vaccine is now available.  I would call your insurance to see if a shingles vaccine is covered and get this at your pharmacy

## 2018-09-11 NOTE — PROGRESS NOTES
Nixon Kya,    I had the opportunity to review your recent labs and a summary of your labs reads as follows:    Your complete blood counts show no sign of anemia, normal white blood cell count and platelets.  Your comprehensive metabolic panel showed normal renal function, normal liver function, and normal fasting blood glucose indicating no evidence of diabetes mellitus.  Your fasting lipid panel show  - normal HDL (good) cholesterol -as your goal is greater than 40  - low LDL (bad) cholesterol as your goal is less than 130  - normal triglyceride levels  Your PSA level is also stable indicating no evidence of prostate cancer   Your urinalysis did show trace blood on dipstick however there is no blood seen in the microscopic examination and a recheck should be done sometime later this fall    Please let me know if you need anything at all    Sincerely,  Mathew Ernst MD

## 2018-09-21 ENCOUNTER — TELEPHONE (OUTPATIENT)
Dept: SURGERY | Facility: CLINIC | Age: 63
End: 2018-09-21

## 2018-09-21 ENCOUNTER — OFFICE VISIT (OUTPATIENT)
Dept: SURGERY | Facility: CLINIC | Age: 63
End: 2018-09-21
Payer: COMMERCIAL

## 2018-09-21 VITALS
WEIGHT: 234 LBS | BODY MASS INDEX: 28.49 KG/M2 | DIASTOLIC BLOOD PRESSURE: 80 MMHG | HEIGHT: 76 IN | HEART RATE: 78 BPM | SYSTOLIC BLOOD PRESSURE: 124 MMHG

## 2018-09-21 DIAGNOSIS — K42.9 UMBILICAL HERNIA WITHOUT OBSTRUCTION AND WITHOUT GANGRENE: Primary | ICD-10-CM

## 2018-09-21 PROCEDURE — 99214 OFFICE O/P EST MOD 30 MIN: CPT | Performed by: SURGERY

## 2018-09-21 NOTE — LETTER
"2018    Re: Memo Lai - 1955    Memo Lai is a 63 year old male is seen in consultation for a hernia, at the request of Mathew Ernst MD.     Primary, reducible umbilical hernia.     Plan:    We have discussed observation, reduction techniques and importance, incarceration and strangulation signs, symptoms and importance as well as need to seek emergency treatment.       We have discussed open umbilical hernia repair with mesh in detail, including benefits, alternatives, complications, incision, scar, mesh, infection, anesthesia, bleeding, blood transfusion, DVT, PE, hernia recurrence, lifting and activity limits after surgery.  All questions have been answered to the best of my ability.     He has been given literature to review.   We will schedule surgery at the patient's convenience.     Recommended time off work postop:  3 days  Recommended time off lifting 20 lb:   3 wks     HPI:  Memo Lai is a 63 year old male who presents for evaluation of a lump in the pro-umbilical region.       Duration:  5 years   Pain/quality:  No / dull  Inciting event:  No  Exacerbating factors:  prolonged standing    Nausea/vomitting/bloating:  No    Previous surgery in this location:  No     Previous herniorrhaphy:  No   Cough: No  Diabetes: No  Current Smoker: No     Heavy lifting > 20 lb: No      Past Medical History:  has a past medical history of Colon polyp; Family history of colon cancer; High cholesterol; Hypertension; Kidney stone; and Ruptured eardrum (10/14/2013).     Family history reviewed and not pertinent.     ROS:  The 10 point review of systems is negative other than noted in the HPI and above.     PE:    Vitals: /80  Pulse 78  Ht 6' 4\" (1.93 m)  Wt 234 lb (106.1 kg)  BMI 28.48 kg/m2  BMI= Body mass index is 28.48 kg/(m^2).  General - Well developed, well nourished male in no apparent distress  HEENT:  Head normocephalic and atraumatic, pupils " equal and round, conjunctivae clear, no scleral icterus, mucous membranes moist, external ears and nose normal  Neck: Supple without thyromegaly or masses  Lymphatic: No cervical, or supraclavicular lymphadenopathy  Lungs: Clear to auscultation bilaterally  Heart: Regular rate and rhythm, no murmurs  Abdomen:  abdomen is soft without significant tenderness, masses, organomegaly or guarding  Hernia:  umbilical, 5 cm, reducible, non-tender  Extremities: Warm without edema  Musculoskeletal:  Normal station and gait  Neurologic: alert, speech is clear, moves all extremities with good strength  Psychiatric: Mood and affect appropriate  Skin: Without lesions or rashes, or juandice          Shahriar Hughes MD

## 2018-09-21 NOTE — TELEPHONE ENCOUNTER
Type of surgery: open umbilical hernia repair with mesh  Location of surgery: [facility]  Date and time of surgery: 10/26/18 12:10 pm  Surgeon: Dr Hughes  Pre-Op Appt Date: pt to schedule  Post-Op Appt Date: pt to schedule   Packet sent out: Yes  Pre-cert/Authorization completed:  Not Applicable  Date: 09/21/18    General anesthesia  Pt instructed to stop ASA 5 days prior to surgery per Dr Hughes    Surgery date was changed to 10/23/18 9:00 am Dr Hughes

## 2018-09-21 NOTE — PROGRESS NOTES
Assessment:    Memo Lai is a 63 year old male is seen in consultation for a hernia, at the request of Mathew Ernst MD.    Primary, reducible umbilical hernia.    Plan:    We have discussed observation, reduction techniques and importance, incarceration and strangulation signs, symptoms and importance as well as need to seek emergency treatment.      We have discussed open umbilical hernia repair with mesh in detail, including benefits, alternatives, complications, incision, scar, mesh, infection, anesthesia, bleeding, blood transfusion, DVT, PE, hernia recurrence, lifting and activity limits after surgery.  All questions have been answered to the best of my ability.    He has been given literature to review.   We will schedule surgery at the patient's convenience.    Recommended time off work postop:  3 days  Recommended time off lifting 20 lb:   3 wks      HPI:  Memo Lai is a 63 year old male who presents for evaluation of a lump in the pro-umbilical region.      Duration:  5 years   Pain/quality:  No / dull  Inciting event:  No  Exacerbating factors:  prolonged standing    Nausea/vomitting/bloating:  No    Previous surgery in this location:  No     Previous herniorrhaphy:  No   Cough: No  Diabetes: No  Current Smoker: No    Heavy lifting > 20 lb: No     Past Medical History:   has a past medical history of Colon polyp; Family history of colon cancer; High cholesterol; Hypertension; Kidney stone; and Ruptured eardrum (10/14/2013).    Past Surgical History:  Past Surgical History:   Procedure Laterality Date     COLONOSCOPY  10/11/10    [facility]     COLONOSCOPY  09/25/06    [facility]     COLONOSCOPY N/A 8/12/2016    Procedure: COMBINED COLONOSCOPY, SINGLE OR MULTIPLE BIOPSY/POLYPECTOMY BY BIOPSY;  Surgeon: Jose Carlos Gaona MD;  Location:  GI     COLONOSCOPY N/A 10/13/2017    Procedure: COLONOSCOPY;  colonoscopy;  Surgeon: Jose Carlos Gaona MD;  Location:  GI  "    ENT SURGERY  2014    [facility] infection     EYE SURGERY  2015    Last eye apointment was November 2015     GENITOURINARY SURGERY      kidney stone     ORTHOPEDIC SURGERY      meniscus tear and repair right        Social History:  Social History     Social History     Marital status:      Spouse name: N/A     Number of children: N/A     Years of education: N/A     Occupational History     Not on file.     Social History Main Topics     Smoking status: Former Smoker     Quit date: 8/12/1981     Smokeless tobacco: Never Used     Alcohol use 0.6 oz/week      Comment: Social Drinker     Drug use: No     Sexual activity: Not Currently     Partners: Female     Birth control/ protection: Male Surgical     Other Topics Concern     Parent/Sibling W/ Cabg, Mi Or Angioplasty Before 65f 55m? Yes     Father at about 55     Social History Narrative    , 3 children (2 living - one daughter committed suicide 08/2014)    Employed at [employer]        Family History:  Family History   Problem Relation Age of Onset     Colon Cancer Brother 45     Had surgery ro remove part of colon.     Cardiovascular Father      Colon Cancer Paternal Uncle      Family history reviewed and not pertinent.    ROS:  The 10 point review of systems is negative other than noted in the HPI and above.    PE:    Vitals: /80  Pulse 78  Ht 6' 4\" (1.93 m)  Wt 234 lb (106.1 kg)  BMI 28.48 kg/m2  BMI= Body mass index is 28.48 kg/(m^2).  General - Well developed, well nourished male in no apparent distress  HEENT:  Head normocephalic and atraumatic, pupils equal and round, conjunctivae clear, no scleral icterus, mucous membranes moist, external ears and nose normal  Neck: Supple without thyromegaly or masses  Lymphatic: No cervical, or supraclavicular lymphadenopathy  Lungs: Clear to auscultation bilaterally  Heart: Regular rate and rhythm, no murmurs  Abdomen:  abdomen is soft without significant tenderness, " masses, organomegaly or guarding   Hernia:  umbilical, 5 cm, reducible, non-tender  Extremities: Warm without edema  Musculoskeletal:  Normal station and gait  Neurologic: alert, speech is clear, moves all extremities with good strength  Psychiatric: Mood and affect appropriate  Skin: Without lesions or rashes, or juandice    This note was created using voice recognition software. Undetected word substitutions or other errors may have occurred.     Time spent with the patient with greater that 50% of the time in discussion was 15 minutes.     Shahriar Hughes MD    Please route or send letter to:  Primary Care Provider (PCP)

## 2018-10-02 ENCOUNTER — MYC MEDICAL ADVICE (OUTPATIENT)
Dept: FAMILY MEDICINE | Facility: CLINIC | Age: 63
End: 2018-10-02

## 2018-10-22 ENCOUNTER — OFFICE VISIT (OUTPATIENT)
Dept: FAMILY MEDICINE | Facility: CLINIC | Age: 63
End: 2018-10-22
Payer: COMMERCIAL

## 2018-10-22 VITALS
HEIGHT: 76 IN | HEART RATE: 77 BPM | SYSTOLIC BLOOD PRESSURE: 132 MMHG | DIASTOLIC BLOOD PRESSURE: 89 MMHG | BODY MASS INDEX: 28.13 KG/M2 | OXYGEN SATURATION: 97 % | WEIGHT: 231 LBS

## 2018-10-22 DIAGNOSIS — Z23 NEED FOR PROPHYLACTIC VACCINATION AND INOCULATION AGAINST INFLUENZA: ICD-10-CM

## 2018-10-22 DIAGNOSIS — Z01.818 PREOP GENERAL PHYSICAL EXAM: Primary | ICD-10-CM

## 2018-10-22 DIAGNOSIS — I10 BENIGN ESSENTIAL HYPERTENSION: ICD-10-CM

## 2018-10-22 DIAGNOSIS — Z11.3 SCREEN FOR STD (SEXUALLY TRANSMITTED DISEASE): ICD-10-CM

## 2018-10-22 LAB
ALBUMIN UR-MCNC: NEGATIVE MG/DL
APPEARANCE UR: CLEAR
BILIRUB UR QL STRIP: NEGATIVE
COLOR UR AUTO: YELLOW
GLUCOSE UR STRIP-MCNC: NEGATIVE MG/DL
HGB UR QL STRIP: NEGATIVE
KETONES UR STRIP-MCNC: NEGATIVE MG/DL
LEUKOCYTE ESTERASE UR QL STRIP: NEGATIVE
NITRATE UR QL: NEGATIVE
PH UR STRIP: 7 PH (ref 5–7)
SOURCE: NORMAL
SP GR UR STRIP: 1.01 (ref 1–1.03)
UROBILINOGEN UR STRIP-ACNC: 1 EU/DL (ref 0.2–1)

## 2018-10-22 PROCEDURE — 99396 PREV VISIT EST AGE 40-64: CPT | Mod: 25 | Performed by: INTERNAL MEDICINE

## 2018-10-22 PROCEDURE — 87389 HIV-1 AG W/HIV-1&-2 AB AG IA: CPT | Performed by: INTERNAL MEDICINE

## 2018-10-22 PROCEDURE — 87591 N.GONORRHOEAE DNA AMP PROB: CPT | Performed by: INTERNAL MEDICINE

## 2018-10-22 PROCEDURE — 90686 IIV4 VACC NO PRSV 0.5 ML IM: CPT | Performed by: INTERNAL MEDICINE

## 2018-10-22 PROCEDURE — 36415 COLL VENOUS BLD VENIPUNCTURE: CPT | Performed by: INTERNAL MEDICINE

## 2018-10-22 PROCEDURE — 90471 IMMUNIZATION ADMIN: CPT | Performed by: INTERNAL MEDICINE

## 2018-10-22 PROCEDURE — 86780 TREPONEMA PALLIDUM: CPT | Performed by: INTERNAL MEDICINE

## 2018-10-22 PROCEDURE — 93000 ELECTROCARDIOGRAM COMPLETE: CPT | Performed by: INTERNAL MEDICINE

## 2018-10-22 PROCEDURE — 86803 HEPATITIS C AB TEST: CPT | Performed by: INTERNAL MEDICINE

## 2018-10-22 PROCEDURE — 81003 URINALYSIS AUTO W/O SCOPE: CPT | Performed by: INTERNAL MEDICINE

## 2018-10-22 PROCEDURE — 87491 CHLMYD TRACH DNA AMP PROBE: CPT | Performed by: INTERNAL MEDICINE

## 2018-10-22 NOTE — NURSING NOTE
Injectable Influenza Immunization Documentation    1.  Is the person to be vaccinated sick today?  No    2. Does the person to be vaccinated have an allergy to eggs or to a component of the vaccine?  No    3. Has the person to be vaccinated today ever had a serious reaction to influenza vaccine in the past?  No    4. Has the person to be vaccinated ever had Guillain-Waverly syndrome?  No    Form completed verbally with patient. Nimo GONZALES MA     Prior to injection verified patient identity using patient's name and date of birth.  Due to injection administration, patient instructed to remain in clinic for 15 minutes  afterwards, and to report any adverse reaction to me immediately.

## 2018-10-22 NOTE — PATIENT INSTRUCTIONS
Before Your Surgery      Call your surgeon if there is any change in your health. This includes signs of a cold or flu (such as a sore throat, runny nose, cough, rash or fever).    Do not smoke, drink alcohol or take over the counter medicine (unless your surgeon or primary care doctor tells you to) for the 24 hours before and after surgery.    If you take prescribed drugs: Follow your doctor s orders about which medicines to take and which to stop until after surgery.    Eating and drinking prior to surgery: follow the instructions from your surgeon    Take a shower or bath the night before surgery. Use the soap your surgeon gave you to gently clean your skin. If you do not have soap from your surgeon, use your regular soap. Do not shave or scrub the surgery site.  Wear clean pajamas and have clean sheets on your bed.     (Z01.818) Preop general physical exam  (primary encounter diagnosis)  Comment: you are medically optimized for your upcoming surgery pending EKG.  Noted holding aspirin x 1 week.   Continue metoprolol as per usual dosing  Plan: EKG 12-lead complete w/read - Clinics            (Z23) Need for prophylactic vaccination and inoculation against influenza  Comment:   Plan: FLU VACCINE, SPLIT VIRUS, IM (QUADRIVALENT)         [95022]- >3 YRS, Vaccine Administration,         Initial [48548]            (Z11.3) Screen for STD (sexually transmitted disease)  Comment: Check urinalysis and labs today   Plan: UA reflex to Microscopic and Culture, NEISSERIA        GONORRHOEA PCR, CHLAMYDIA TRACHOMATIS PCR,         Treponema Abs w Reflex to RPR and Titer, HIV         Antigen Antibody Combo, Hepatitis C Screen         Reflex to HCV RNA Quant and Genotype            (I10) Benign essential hypertension  Comment: blood pressure is excellent  Plan:

## 2018-10-22 NOTE — MR AVS SNAPSHOT
After Visit Summary   10/22/2018    Memo Lai    MRN: [id number]           Patient Information     Date Of Birth          1955        Visit Information        Provider Department      10/22/2018 11:30 AM Mathew Ernst MD [facility]        Today's Diagnoses     Preop general physical exam    -  1    Need for prophylactic vaccination and inoculation against influenza        Screen for STD (sexually transmitted disease)        Benign essential hypertension          Care Instructions      Before Your Surgery      Call your surgeon if there is any change in your health. This includes signs of a cold or flu (such as a sore throat, runny nose, cough, rash or fever).    Do not smoke, drink alcohol or take over the counter medicine (unless your surgeon or primary care doctor tells you to) for the 24 hours before and after surgery.    If you take prescribed drugs: Follow your doctor s orders about which medicines to take and which to stop until after surgery.    Eating and drinking prior to surgery: follow the instructions from your surgeon    Take a shower or bath the night before surgery. Use the soap your surgeon gave you to gently clean your skin. If you do not have soap from your surgeon, use your regular soap. Do not shave or scrub the surgery site.  Wear clean pajamas and have clean sheets on your bed.     (Z01.818) Preop general physical exam  (primary encounter diagnosis)  Comment: you are medically optimized for your upcoming surgery pending EKG.  Noted holding aspirin x 1 week.   Continue metoprolol as per usual dosing  Plan: EKG 12-lead complete w/read - Clinics            (Z23) Need for prophylactic vaccination and inoculation against influenza  Comment:   Plan: FLU VACCINE, SPLIT VIRUS, IM (QUADRIVALENT)         [52133]- >3 YRS, Vaccine Administration,         Initial [84298]            (Z11.3) Screen for STD (sexually transmitted disease)  Comment: Check  urinalysis and labs today   Plan: UA reflex to Microscopic and Culture, NEISSERIA        GONORRHOEA PCR, CHLAMYDIA TRACHOMATIS PCR,         Treponema Abs w Reflex to RPR and Titer, HIV         Antigen Antibody Combo, Hepatitis C Screen         Reflex to HCV RNA Quant and Genotype            (I10) Benign essential hypertension  Comment: blood pressure is excellent  Plan:              Follow-ups after your visit        Your next 10 appointments already scheduled     Oct 23, 2018   Procedure with Shahriar Hughes MD   [facility] (--)    [address]  [place] [address]   [phone]            Oct 23, 2018  8:45 AM CDT   [facility] Same Day Surgery with Shahriar Hughes MD   [facility] ([facility])    [facility] ([facility])   [phone]              Who to contact     If you have questions or need follow up information about today's clinic visit or your schedule please contact [facility] directly at [phone].  Normal or non-critical lab and imaging results will be communicated to you by Bidgelyhart, letter or phone within 4 business days after the clinic has received the results. If you do not hear from us within 7 days, please contact the clinic through Bidgelyhart or phone. If you have a critical or abnormal lab result, we will notify you by phone as soon as possible.  Submit refill requests through Duetto or call your pharmacy and they will forward the refill request to us. Please allow 3 business days for your refill to be completed.          Additional Information About Your Visit        Bidgelyhart Information     Duetto gives you secure access to your electronic health record. If you see a primary care provider, you can also send messages to your care team and make appointments. If you have questions, please call your primary care clinic.  If you do not have a primary  "care provider, please call [phone] and they will assist you.        Care EveryWhere ID     This is your Care EveryWhere ID. This could be used by other organizations to access your [facility] medical records  [id number]        Your Vitals Were     Pulse Height Pulse Oximetry BMI (Body Mass Index)          77 6' 4\" (1.93 m) 97% 28.12 kg/m2         Blood Pressure from Last 3 Encounters:   10/22/18 132/89   09/21/18 124/80   09/11/18 126/81    Weight from Last 3 Encounters:   10/22/18 231 lb (104.8 kg)   09/21/18 234 lb (106.1 kg)   09/11/18 234 lb (106.1 kg)              We Performed the Following     CHLAMYDIA TRACHOMATIS PCR     EKG 12-lead complete w/read - Clinics     FLU VACCINE, SPLIT VIRUS, IM (QUADRIVALENT) [89712]- >3 YRS     Hepatitis C Screen Reflex to HCV RNA Quant and Genotype     HIV Antigen Antibody Combo     NEISSERIA GONORRHOEA PCR     Treponema Abs w Reflex to RPR and Titer     UA reflex to Microscopic and Culture     Vaccine Administration, Initial [63119]        Primary Care Provider Office Phone # Fax #    Mathew Ernst [phone] [address] [address]        Equal Access to Services     BROCK ZHENG AH: Hadii aad ku hadasho Soomaali, waaxda luqadaha, qaybta kaalmada adeegyada, waxay idiin haymolinan monster leo . So [facility] [phone].    ATENCIÓN: Si habla español, tiene a dunlap disposición servicios gratuitos de asistencia lingüística. Llame al [phone].    We comply with applicable federal civil rights laws and [place] laws. We do not discriminate on the basis of race, color, national origin, age, disability, sex, sexual orientation, or gender identity.            Thank you!     Thank you for choosing [facility]  for your care. Our goal is always to provide you with excellent care. Hearing back from our patients is one way we can continue to improve our services. Please take a few minutes to complete the written " survey that you may receive in the mail after your visit with us. Thank you!             Your Updated Medication List - Protect others around you: Learn how to safely use, store and throw away your medicines at www.disposemymeds.org.          This list is accurate as of 10/22/18 12:22 PM.  Always use your most recent med list.                   Brand Name Dispense Instructions for use Diagnosis    aspirin 81 MG EC tablet     90 tablet    Take 1 tablet by mouth daily.        atorvastatin 20 MG tablet    LIPITOR    90 tablet    Take 1 tablet (20 mg) by mouth daily    Hyperlipidemia LDL goal <160       glucosamine-chondroitinoitin 3855-1140 MG/30ML Liqd      Take by mouth daily        metoprolol succinate 25 MG 24 hr tablet    TOPROL-XL    90 tablet    Take 1 tablet (25 mg) by mouth daily    Benign essential hypertension       Multi-vitamin Tabs tablet     100 tablet    Take 1 tablet by mouth daily.

## 2018-10-22 NOTE — PROGRESS NOTES
[address] [address]  [phone]  Dept: [phone]    PRE-OP EVALUATION:  Today's date: 10/22/2018    Memo Lai (: 1955) presents for pre-operative evaluation assessment as requested by Shahriar Orellana.  He requires evaluation and anesthesia risk assessment prior to undergoing surgery/procedure for treatment of OPEN UMBILICAL HERNIA REPAIR WITH MESH .      Primary Physician: Mathew Ernst  Type of Anesthesia Anticipated: General    Patient has a Health Care Directive or Living Will:  NO    Preop Questions 10/19/2018   Who is doing your surgery? Dr Shahriar Hughes   What are you having done? Umbilical Hernia   Date of Surgery/Procedure: 10/23/2018   Facility or Hospital where procedure/surgery will be performed: [place]    1.  Do you have a history of Heart attack, stroke, stent, coronary bypass surgery, or other heart surgery? No   2.  Do you ever have any pain or discomfort in your chest? No   3.  Do you have a history of  Heart Failure? No   4.   Are you troubled by shortness of breath when:  walking on a level surface, or up a slight hill, or at night? No   5.  Do you currently have a cold, bronchitis or other respiratory infection? No   6.  Do you have a cough, shortness of breath, or wheezing? No   7.  Do you sometimes get pains in the calves of your legs when you walk? No   8. Do you or anyone in your family have previous history of blood clots? No   9.  Do you or does anyone in your family have a serious bleeding problem such as prolonged bleeding following surgeries or cuts? No   10. Have you ever had problems with anemia or been told to take iron pills? No   11. Have you had any abnormal blood loss such as black, tarry or bloody stools? No   12. Have you ever had a blood transfusion? No   13. Have you or any of your relatives ever had problems with anesthesia? No   14. Do you have sleep apnea, excessive snoring or  daytime drowsiness? No   15. Do you have any prosthetic heart valves? No   16. Do you have prosthetic joints? No         HPI:     HPI related to upcoming procedure: normal       See problem list for active medical problems.  Problems all longstanding and stable, except as noted/documented.  See ROS for pertinent symptoms related to these conditions.                                                                                                                                                          .    MEDICAL HISTORY:     Patient Active Problem List    Diagnosis Date Noted     Ruptured eardrum 10/14/2013     Priority: Medium     Family history of colon cancer 10/13/2013     Priority: Medium     Hyperlipidemia LDL goal <160 10/08/2013     Priority: Medium     IFG (impaired fasting glucose) 10/08/2013     Priority: Medium     Low HDL (under 40) 10/08/2013     Priority: Medium     Metabolic syndrome X 10/08/2013     Priority: Medium     Hypertriglyceridemia 10/08/2013     Priority: Medium      Past Medical History:   Diagnosis Date     Colon polyp      Family history of colon cancer      High cholesterol      Hypertension      Kidney stone      Ruptured eardrum 10/14/2013     Past Surgical History:   Procedure Laterality Date     COLONOSCOPY  10/11/10    [facility]     COLONOSCOPY  09/25/06    [facility]     COLONOSCOPY N/A 8/12/2016    Procedure: COMBINED COLONOSCOPY, SINGLE OR MULTIPLE BIOPSY/POLYPECTOMY BY BIOPSY;  Surgeon: Jose Carlos Gaona MD;  Location:  GI     COLONOSCOPY N/A 10/13/2017    Procedure: COLONOSCOPY;  colonoscopy;  Surgeon: Jose Carlos Gaona MD;  Location:  GI     ENT SURGERY  2014    [facility] for ear infection     EYE SURGERY  2015    Last eye apointment was November 2015     GENITOURINARY SURGERY      kidney stone     ORTHOPEDIC SURGERY      meniscus tear and repair right     Current Outpatient Prescriptions   Medication Sig Dispense Refill     aspirin 81 MG EC tablet  "Take 1 tablet by mouth daily. 90 tablet 3     atorvastatin (LIPITOR) 20 MG tablet Take 1 tablet (20 mg) by mouth daily 90 tablet 3     glucosamine-chondroitinoitin 9302-8065 MG/30ML LIQD Take by mouth daily       metoprolol succinate (TOPROL-XL) 25 MG 24 hr tablet Take 1 tablet (25 mg) by mouth daily 90 tablet 3     multivitamin, therapeutic with minerals (MULTI-VITAMIN) TABS Take 1 tablet by mouth daily. 100 tablet 3     OTC products: None, except as noted above    Allergies   Allergen Reactions     Morphine      Nkda [No Known Drug Allergies]       Latex Allergy: NO    Social History   Substance Use Topics     Smoking status: Former Smoker     Quit date: 8/12/1981     Smokeless tobacco: Never Used     Alcohol use 0.6 oz/week      Comment: Social Drinker     History   Drug Use No       REVIEW OF SYSTEMS:   CONSTITUTIONAL: NEGATIVE for fever, chills, change in weight  ENT/MOUTH: NEGATIVE for ear, mouth and throat problems  RESP: NEGATIVE for significant cough or SOB  CV: NEGATIVE for chest pain, palpitations or peripheral edema    EXAM:   /89 (BP Location: Right arm, Patient Position: Chair, Cuff Size: Adult Large)  Pulse 77  Ht 6' 4\" (1.93 m)  Wt 231 lb (104.8 kg)  SpO2 97%  BMI 28.12 kg/m2    GENERAL APPEARANCE: healthy, alert and no distress     EYES: EOMI,  PERRL     HENT: ear canals and TM's normal and nose and mouth without ulcers or lesions     NECK: no adenopathy, no asymmetry, masses, or scars and thyroid normal to palpation     RESP: lungs clear to auscultation - no rales, rhonchi or wheezes     CV: regular rates and rhythm, normal S1 S2, no S3 or S4 and no murmur, click or rub     ABDOMEN:  soft, nontender, no HSM or masses and bowel sounds normal     MS: extremities normal- no gross deformities noted, no evidence of inflammation in joints, FROM in all extremities.     SKIN: no suspicious lesions or rashes     NEURO: Normal strength and tone, sensory exam grossly normal, mentation intact and " speech normal     PSYCH: mentation appears normal. and affect normal/bright     LYMPHATICS: No cervical adenopathy    DIAGNOSTICS:     EKG: appears normal, NSR, normal axis, normal intervals, no acute ST/T changes c/w ischemia, no LVH by voltage criteria, unchanged from previous tracings  Labs Resulted Today:   Results for orders placed or performed in visit on 09/10/18   CBC with platelets   Result Value Ref Range    WBC 4.8 4.0 - 11.0 10e9/L    RBC Count 4.97 4.4 - 5.9 10e12/L    Hemoglobin 15.9 13.3 - 17.7 g/dL    Hematocrit 46.4 40.0 - 53.0 %    MCV 93 78 - 100 fl    MCH 32.0 26.5 - 33.0 pg    MCHC 34.3 31.5 - 36.5 g/dL    RDW 14.4 10.0 - 15.0 %    Platelet Count 213 150 - 450 10e9/L   Lipid panel reflex to direct LDL Fasting   Result Value Ref Range    Cholesterol 178 <200 mg/dL    Triglycerides 144 <150 mg/dL    HDL Cholesterol 48 >39 mg/dL    LDL Cholesterol Calculated 101 (H) <100 mg/dL    Non HDL Cholesterol 130 (H) <130 mg/dL   Comprehensive metabolic panel   Result Value Ref Range    Sodium 141 133 - 144 mmol/L    Potassium 4.0 3.4 - 5.3 mmol/L    Chloride 109 94 - 109 mmol/L    Carbon Dioxide 25 20 - 32 mmol/L    Anion Gap 7 3 - 14 mmol/L    Glucose 95 70 - 99 mg/dL    Urea Nitrogen 16 7 - 30 mg/dL    Creatinine 0.82 0.66 - 1.25 mg/dL    GFR Estimate >90 >60 mL/min/1.7m2    GFR Estimate If Black >90 >60 mL/min/1.7m2    Calcium 8.6 8.5 - 10.1 mg/dL    Bilirubin Total 0.5 0.2 - 1.3 mg/dL    Albumin 3.7 3.4 - 5.0 g/dL    Protein Total 7.1 6.8 - 8.8 g/dL    Alkaline Phosphatase 81 40 - 150 U/L    ALT 56 0 - 70 U/L    AST 34 0 - 45 U/L   UA reflex to Microscopic and Culture   Result Value Ref Range    Color Urine Yellow     Appearance Urine Clear     Glucose Urine Negative NEG^Negative mg/dL    Bilirubin Urine Negative NEG^Negative    Ketones Urine Negative NEG^Negative mg/dL    Specific Gravity Urine 1.010 1.003 - 1.035    Blood Urine Trace (A) NEG^Negative    pH Urine 6.5 5.0 - 7.0 pH    Protein Albumin  Urine Negative NEG^Negative mg/dL    Urobilinogen Urine 0.2 0.2 - 1.0 EU/dL    Nitrite Urine Negative NEG^Negative    Leukocyte Esterase Urine Negative NEG^Negative    Source Midstream Urine    Hemoglobin A1c   Result Value Ref Range    Hemoglobin A1C 5.4 0 - 5.6 %   Prostate spec antigen screen   Result Value Ref Range    PSA 1.41 0 - 4 ug/L   Urine Microscopic   Result Value Ref Range    WBC Urine 0 - 5 OTO5^0 - 5 /HPF    RBC Urine O - 2 OTO2^O - 2 /HPF    Bacteria Urine Few (A) NEG^Negative /HPF     Labs Drawn and in Process:   Unresulted Labs Ordered in the Past 30 Days of this Admission     No orders found from 8/23/2018 to 10/23/2018.          Recent Labs   Lab Test  09/10/18   1032  07/24/17   0756   HGB  15.9  15.1   PLT  213  213   NA  141  141   POTASSIUM  4.0  4.1   CR  0.82  0.83   A1C  5.4   --         IMPRESSION:   Reason for surgery/procedure: inguinal Hernia  Diagnosis/reason for consult: Pre-operative Evaluation    The proposed surgical procedure is considered INTERMEDIATE risk.    REVISED CARDIAC RISK INDEX  The patient has the following serious cardiovascular risks for perioperative complications such as (MI, PE, VFib and 3  AV Block):  No serious cardiac risks  INTERPRETATION: 0 risks: Class I (very low risk - 0.4% complication rate)    The patient has the following additional risks for perioperative complications:  No identified additional risks      ICD-10-CM    1. Preop general physical exam Z01.818        RECOMMENDATIONS:   (Z01.818) Preop general physical exam  (primary encounter diagnosis)  Comment: you are medically optimized for your upcoming surgery pending EKG.  Noted holding aspirin x 1 week.   Continue metoprolol as per usual dosing  Plan: EKG 12-lead complete w/read - Clinics            (Z23) Need for prophylactic vaccination and inoculation against influenza  Comment:   Plan: FLU VACCINE, SPLIT VIRUS, IM (QUADRIVALENT)         [12809]- >3 YRS, Vaccine Administration,         Initial  [81638]            (Z11.3) Screen for STD (sexually transmitted disease)  Comment: Check urinalysis and labs today   Plan: UA reflex to Microscopic and Culture, NEISSERIA        GONORRHOEA PCR, CHLAMYDIA TRACHOMATIS PCR,         Treponema Abs w Reflex to RPR and Titer, HIV         Antigen Antibody Combo, Hepatitis C Screen         Reflex to HCV RNA Quant and Genotype            (I10) Benign essential hypertension  Comment: blood pressure is excellent  Plan:             Signed Electronically by: Mathew Ernst MD, MD    Copy of this evaluation report is provided to requesting physician.    Bg Preop Guidelines    Revised Cardiac Risk Index

## 2018-10-23 ENCOUNTER — ANESTHESIA (OUTPATIENT)
Dept: SURGERY | Facility: CLINIC | Age: 63
End: 2018-10-23
Payer: COMMERCIAL

## 2018-10-23 ENCOUNTER — HOSPITAL ENCOUNTER (OUTPATIENT)
Facility: CLINIC | Age: 63
Discharge: HOME | End: 2018-10-23
Attending: SURGERY | Admitting: SURGERY
Payer: COMMERCIAL

## 2018-10-23 ENCOUNTER — SURGERY (OUTPATIENT)
Age: 63
End: 2018-10-23

## 2018-10-23 ENCOUNTER — ANESTHESIA EVENT (OUTPATIENT)
Dept: SURGERY | Facility: CLINIC | Age: 63
End: 2018-10-23
Payer: COMMERCIAL

## 2018-10-23 ENCOUNTER — OFFICE VISIT (OUTPATIENT)
Dept: SURGERY | Facility: PHYSICIAN GROUP | Age: 63
End: 2018-10-23
Payer: COMMERCIAL

## 2018-10-23 VITALS
SYSTOLIC BLOOD PRESSURE: 139 MMHG | WEIGHT: 227.9 LBS | BODY MASS INDEX: 27.75 KG/M2 | OXYGEN SATURATION: 98 % | DIASTOLIC BLOOD PRESSURE: 79 MMHG | RESPIRATION RATE: 18 BRPM | HEART RATE: 57 BPM | TEMPERATURE: 96.8 F | HEIGHT: 76 IN

## 2018-10-23 DIAGNOSIS — G89.18 POST-OP PAIN: Primary | ICD-10-CM

## 2018-10-23 LAB
HCV AB SERPL QL IA: NONREACTIVE
HIV 1+2 AB+HIV1 P24 AG SERPL QL IA: NONREACTIVE
T PALLIDUM AB SER QL: NONREACTIVE

## 2018-10-23 PROCEDURE — 71000013 ZZH RECOVERY PHASE 1 LEVEL 1 EA ADDTL HR: Performed by: SURGERY

## 2018-10-23 PROCEDURE — 36000050 ZZH SURGERY LEVEL 2 1ST 30 MIN: Performed by: SURGERY

## 2018-10-23 PROCEDURE — 25000125 ZZHC RX 250: Performed by: SURGERY

## 2018-10-23 PROCEDURE — 25000128 H RX IP 250 OP 636: Performed by: SURGERY

## 2018-10-23 PROCEDURE — 25000132 ZZH RX MED GY IP 250 OP 250 PS 637: Performed by: PHYSICIAN ASSISTANT

## 2018-10-23 PROCEDURE — 40000170 ZZH STATISTIC PRE-PROCEDURE ASSESSMENT II: Performed by: SURGERY

## 2018-10-23 PROCEDURE — C1781 MESH (IMPLANTABLE): HCPCS | Performed by: SURGERY

## 2018-10-23 PROCEDURE — 36000052 ZZH SURGERY LEVEL 2 EA 15 ADDTL MIN: Performed by: SURGERY

## 2018-10-23 PROCEDURE — 25000125 ZZHC RX 250: Performed by: NURSE ANESTHETIST, CERTIFIED REGISTERED

## 2018-10-23 PROCEDURE — 49585 ZZHC REPAIR UMBILICAL HERN,5+Y/O,REDUC: CPT | Performed by: SURGERY

## 2018-10-23 PROCEDURE — 25000128 H RX IP 250 OP 636: Performed by: NURSE ANESTHETIST, CERTIFIED REGISTERED

## 2018-10-23 PROCEDURE — 71000027 ZZH RECOVERY PHASE 2 EACH 15 MINS: Performed by: SURGERY

## 2018-10-23 PROCEDURE — 27210794 ZZH OR GENERAL SUPPLY STERILE: Performed by: SURGERY

## 2018-10-23 PROCEDURE — 37000008 ZZH ANESTHESIA TECHNICAL FEE, 1ST 30 MIN: Performed by: SURGERY

## 2018-10-23 PROCEDURE — 37000009 ZZH ANESTHESIA TECHNICAL FEE, EACH ADDTL 15 MIN: Performed by: SURGERY

## 2018-10-23 PROCEDURE — 49585 ZZHC REPAIR UMBILICAL HERN,5+Y/O,REDUC: CPT | Mod: AS | Performed by: PHYSICIAN ASSISTANT

## 2018-10-23 PROCEDURE — 71000012 ZZH RECOVERY PHASE 1 LEVEL 1 FIRST HR: Performed by: SURGERY

## 2018-10-23 DEVICE — MESH VENTRALEX HERNIA 2.5" CIRCLE MED W/STRAP 5950008: Type: IMPLANTABLE DEVICE | Site: UMBILICAL | Status: FUNCTIONAL

## 2018-10-23 RX ORDER — AMOXICILLIN 250 MG
1-2 CAPSULE ORAL 2 TIMES DAILY
Qty: 30 TABLET | Refills: 0 | Status: SHIPPED | OUTPATIENT
Start: 2018-10-23 | End: 2019-09-25

## 2018-10-23 RX ORDER — FENTANYL CITRATE 50 UG/ML
25-50 INJECTION, SOLUTION INTRAMUSCULAR; INTRAVENOUS
Status: DISCONTINUED | OUTPATIENT
Start: 2018-10-23 | End: 2018-10-23 | Stop reason: HOSPADM

## 2018-10-23 RX ORDER — CEFAZOLIN SODIUM 1 G/3ML
1 INJECTION, POWDER, FOR SOLUTION INTRAMUSCULAR; INTRAVENOUS SEE ADMIN INSTRUCTIONS
Status: DISCONTINUED | OUTPATIENT
Start: 2018-10-23 | End: 2018-10-23 | Stop reason: HOSPADM

## 2018-10-23 RX ORDER — MAGNESIUM HYDROXIDE 1200 MG/15ML
LIQUID ORAL PRN
Status: DISCONTINUED | OUTPATIENT
Start: 2018-10-23 | End: 2018-10-23 | Stop reason: HOSPADM

## 2018-10-23 RX ORDER — SODIUM CHLORIDE, SODIUM LACTATE, POTASSIUM CHLORIDE, CALCIUM CHLORIDE 600; 310; 30; 20 MG/100ML; MG/100ML; MG/100ML; MG/100ML
INJECTION, SOLUTION INTRAVENOUS CONTINUOUS PRN
Status: DISCONTINUED | OUTPATIENT
Start: 2018-10-23 | End: 2018-10-23

## 2018-10-23 RX ORDER — HYDROCODONE BITARTRATE AND ACETAMINOPHEN 5; 325 MG/1; MG/1
1-2 TABLET ORAL EVERY 4 HOURS PRN
Qty: 20 TABLET | Refills: 0 | Status: SHIPPED | OUTPATIENT
Start: 2018-10-23 | End: 2019-09-25

## 2018-10-23 RX ORDER — PROPOFOL 10 MG/ML
INJECTION, EMULSION INTRAVENOUS PRN
Status: DISCONTINUED | OUTPATIENT
Start: 2018-10-23 | End: 2018-10-23

## 2018-10-23 RX ORDER — HYDROMORPHONE HYDROCHLORIDE 1 MG/ML
.3-.5 INJECTION, SOLUTION INTRAMUSCULAR; INTRAVENOUS; SUBCUTANEOUS EVERY 10 MIN PRN
Status: DISCONTINUED | OUTPATIENT
Start: 2018-10-23 | End: 2018-10-23 | Stop reason: HOSPADM

## 2018-10-23 RX ORDER — DEXAMETHASONE SODIUM PHOSPHATE 4 MG/ML
INJECTION, SOLUTION INTRA-ARTICULAR; INTRALESIONAL; INTRAMUSCULAR; INTRAVENOUS; SOFT TISSUE PRN
Status: DISCONTINUED | OUTPATIENT
Start: 2018-10-23 | End: 2018-10-23

## 2018-10-23 RX ORDER — CEFAZOLIN SODIUM 2 G/100ML
2 INJECTION, SOLUTION INTRAVENOUS
Status: COMPLETED | OUTPATIENT
Start: 2018-10-23 | End: 2018-10-23

## 2018-10-23 RX ORDER — ONDANSETRON 4 MG/1
4 TABLET, ORALLY DISINTEGRATING ORAL EVERY 30 MIN PRN
Status: DISCONTINUED | OUTPATIENT
Start: 2018-10-23 | End: 2018-10-23 | Stop reason: HOSPADM

## 2018-10-23 RX ORDER — MEPERIDINE HYDROCHLORIDE 25 MG/ML
12.5 INJECTION INTRAMUSCULAR; INTRAVENOUS; SUBCUTANEOUS
Status: DISCONTINUED | OUTPATIENT
Start: 2018-10-23 | End: 2018-10-23 | Stop reason: HOSPADM

## 2018-10-23 RX ORDER — FENTANYL CITRATE 50 UG/ML
INJECTION, SOLUTION INTRAMUSCULAR; INTRAVENOUS PRN
Status: DISCONTINUED | OUTPATIENT
Start: 2018-10-23 | End: 2018-10-23

## 2018-10-23 RX ORDER — PROPOFOL 10 MG/ML
INJECTION, EMULSION INTRAVENOUS CONTINUOUS PRN
Status: DISCONTINUED | OUTPATIENT
Start: 2018-10-23 | End: 2018-10-23

## 2018-10-23 RX ORDER — SODIUM CHLORIDE, SODIUM LACTATE, POTASSIUM CHLORIDE, CALCIUM CHLORIDE 600; 310; 30; 20 MG/100ML; MG/100ML; MG/100ML; MG/100ML
INJECTION, SOLUTION INTRAVENOUS CONTINUOUS
Status: DISCONTINUED | OUTPATIENT
Start: 2018-10-23 | End: 2018-10-23 | Stop reason: HOSPADM

## 2018-10-23 RX ORDER — HYDROCODONE BITARTRATE AND ACETAMINOPHEN 5; 325 MG/1; MG/1
1 TABLET ORAL
Status: COMPLETED | OUTPATIENT
Start: 2018-10-23 | End: 2018-10-23

## 2018-10-23 RX ORDER — ONDANSETRON 2 MG/ML
4 INJECTION INTRAMUSCULAR; INTRAVENOUS EVERY 30 MIN PRN
Status: DISCONTINUED | OUTPATIENT
Start: 2018-10-23 | End: 2018-10-23 | Stop reason: HOSPADM

## 2018-10-23 RX ORDER — NALOXONE HYDROCHLORIDE 0.4 MG/ML
.1-.4 INJECTION, SOLUTION INTRAMUSCULAR; INTRAVENOUS; SUBCUTANEOUS
Status: DISCONTINUED | OUTPATIENT
Start: 2018-10-23 | End: 2018-10-23 | Stop reason: HOSPADM

## 2018-10-23 RX ORDER — ONDANSETRON 2 MG/ML
INJECTION INTRAMUSCULAR; INTRAVENOUS PRN
Status: DISCONTINUED | OUTPATIENT
Start: 2018-10-23 | End: 2018-10-23

## 2018-10-23 RX ADMIN — BUPIVACAINE HYDROCHLORIDE AND EPINEPHRINE BITARTRATE 17 ML: 5; .005 INJECTION, SOLUTION EPIDURAL; INTRACAUDAL; PERINEURAL at 09:44

## 2018-10-23 RX ADMIN — PHENYLEPHRINE HYDROCHLORIDE 100 MCG: 10 INJECTION, SOLUTION INTRAMUSCULAR; INTRAVENOUS; SUBCUTANEOUS at 09:34

## 2018-10-23 RX ADMIN — PHENYLEPHRINE HYDROCHLORIDE 150 MCG: 10 INJECTION, SOLUTION INTRAMUSCULAR; INTRAVENOUS; SUBCUTANEOUS at 09:48

## 2018-10-23 RX ADMIN — PROPOFOL 20 MG: 10 INJECTION, EMULSION INTRAVENOUS at 09:12

## 2018-10-23 RX ADMIN — PHENYLEPHRINE HYDROCHLORIDE 100 MCG: 10 INJECTION, SOLUTION INTRAMUSCULAR; INTRAVENOUS; SUBCUTANEOUS at 09:27

## 2018-10-23 RX ADMIN — SODIUM CHLORIDE 1000 ML: 900 IRRIGANT IRRIGATION at 08:41

## 2018-10-23 RX ADMIN — DEXAMETHASONE SODIUM PHOSPHATE 4 MG: 4 INJECTION, SOLUTION INTRA-ARTICULAR; INTRALESIONAL; INTRAMUSCULAR; INTRAVENOUS; SOFT TISSUE at 09:23

## 2018-10-23 RX ADMIN — DEXMEDETOMIDINE HYDROCHLORIDE 8 MCG: 100 INJECTION, SOLUTION INTRAVENOUS at 09:09

## 2018-10-23 RX ADMIN — DEXMEDETOMIDINE HYDROCHLORIDE 12 MCG: 100 INJECTION, SOLUTION INTRAVENOUS at 09:05

## 2018-10-23 RX ADMIN — HYDROCODONE BITARTRATE AND ACETAMINOPHEN 1 TABLET: 5; 325 TABLET ORAL at 10:53

## 2018-10-23 RX ADMIN — PHENYLEPHRINE HYDROCHLORIDE 100 MCG: 10 INJECTION, SOLUTION INTRAMUSCULAR; INTRAVENOUS; SUBCUTANEOUS at 09:18

## 2018-10-23 RX ADMIN — PHENYLEPHRINE HYDROCHLORIDE 100 MCG: 10 INJECTION, SOLUTION INTRAMUSCULAR; INTRAVENOUS; SUBCUTANEOUS at 09:40

## 2018-10-23 RX ADMIN — PROPOFOL 100 MCG/KG/MIN: 10 INJECTION, EMULSION INTRAVENOUS at 09:29

## 2018-10-23 RX ADMIN — PROPOFOL 20 MG: 10 INJECTION, EMULSION INTRAVENOUS at 09:13

## 2018-10-23 RX ADMIN — SODIUM CHLORIDE, POTASSIUM CHLORIDE, SODIUM LACTATE AND CALCIUM CHLORIDE: 600; 310; 30; 20 INJECTION, SOLUTION INTRAVENOUS at 08:59

## 2018-10-23 RX ADMIN — FENTANYL CITRATE 50 MCG: 50 INJECTION, SOLUTION INTRAMUSCULAR; INTRAVENOUS at 09:02

## 2018-10-23 RX ADMIN — SODIUM CHLORIDE, POTASSIUM CHLORIDE, SODIUM LACTATE AND CALCIUM CHLORIDE: 600; 310; 30; 20 INJECTION, SOLUTION INTRAVENOUS at 10:00

## 2018-10-23 RX ADMIN — PHENYLEPHRINE HYDROCHLORIDE 100 MCG: 10 INJECTION, SOLUTION INTRAMUSCULAR; INTRAVENOUS; SUBCUTANEOUS at 09:38

## 2018-10-23 RX ADMIN — MIDAZOLAM 2 MG: 1 INJECTION INTRAMUSCULAR; INTRAVENOUS at 09:00

## 2018-10-23 RX ADMIN — CEFAZOLIN SODIUM 2 G: 2 INJECTION, SOLUTION INTRAVENOUS at 09:06

## 2018-10-23 RX ADMIN — ONDANSETRON 4 MG: 2 INJECTION INTRAMUSCULAR; INTRAVENOUS at 09:36

## 2018-10-23 ASSESSMENT — LIFESTYLE VARIABLES: TOBACCO_USE: 1

## 2018-10-23 NOTE — IP AVS SNAPSHOT
MRN:[id number]                      After Visit Summary   10/23/2018    Memo Lai    MRN: [id number]           Thank you!     Thank you for choosing [facility] for your care. Our goal is always to provide you with excellent care. Hearing back from our patients is one way we can continue to improve our services. Please take a few minutes to complete the written survey that you may receive in the mail after you visit with us. Thank you!        Patient Information     Date Of Birth          1955        About your hospital stay     You were admitted on:  October 23, 2018 You last received care in the:  [facility] PACU    You were discharged on:  October 23, 2018       Who to Call     For medical emergencies, please call 911.  For non-urgent questions about your medical care, please call your primary care provider or clinic, [phone]  For questions related to your surgery, please call your surgery clinic        Attending Provider     Provider Specialty    Shahriar Hughes MD Surgery       Primary Care Provider Office Phone # Fax #    Mathew Ernst [phone]      After Care Instructions     Discharge Instructions       Resume your aspirin tomorrow.                  Further instructions from your care team       [facility]  Discharge Instructions: Post-Operative Open Umbilical or Ventral Hernia    ACTIVITY    Take frequent, short walks and increase your activity gradually.      Avoid strenuous physical activity or heavy lifting greater than 15lbs. for 3-4 weeks.  You may climb stairs.    You may drive without restrictions when you are not using any prescription pain medication and feel comfortable in a car.    You may return to work/school when you are comfortable without any prescription pain medication.    WOUND CARE    You may remove your bandage and shower 48 hours after the surgery.  Pat your incision dry  and leave it open to air.  Re-apply dressing (Band-Aids or gauze/tape) as needed for comfort or drainage.    You may have steri-strips (looks like white tape) on your incision.  You may peel off the steri-strips 2 weeks after your surgery if they have not peeled off on their own.     Do not soak your incision in a tub or pool for 2 weeks.     Do not apply any lotions, creams, or ointments to your incision.    A ridge under your incision is normal and will gradually resolve.    DIET    Start with liquids, then gradually resume your regular diet as tolerated.     Drink plenty of fluids to stay hydrated.    PAIN    Expect some tenderness and discomfort at the incision site(s).  Use the prescribed pain medication at your discretion.  Expect gradual resolution of your pain over several days.    You may take ibuprofen with food (unless you have been told not to) instead of or in addition to your prescribed pain medication.  If you are taking Norco or Percocet, do not take any additional acetaminophen/APAP/Tylenol.      Do not drink alcohol or drive while you are taking pain medications.    You may apply ice to your incisions in 20 minute intervals as needed for the next 48 hours.  After that time, consider switching to heat if you prefer.    EXPECTATIONS    Pain medications can cause constipation.  Limit use when possible.  Take over the counter stool softener/stimulant, such as Colace or Senna, 1-2 times a day with plenty of water.  You may take a mild over the counter laxative, such as Miralax or a suppository, as needed.  You may take 1 oz. (2 tablespoons) Milk of Magnesia the evening following surgery to encourage bowel movement.    You may discontinue these medications once you are having regular bowel movements and/or are no longer taking your narcotic pain medication.      FOLLOW UP    Our office will contact you approximately 2 weeks to check on your progress and answer any questions you may have.  If you are  doing well, you will not need to return for a follow up appointment.  If any concerns are identified over the phone, we will help you make an appointment to see a provider.     If you have not received a phone call, have any questions or concerns, or would like to be seen, please call us at [phone] and ask to speak with our nurse.  We are located at [address] [place] [address].    CALL OUR OFFICE [phone] IF YOU HAVE:     Chills or fever above 101 F.    Increased redness, warmth, or drainage at your incisions.    Significant bleeding.    Pain not relieved by your pain medication or rest.    Increasing pain after the first 48 hours.    Any other concerns or questions.    Revised January 2018  Same Day Surgery Discharge Instructions for  Sedation and General Anesthesia       It's not unusual to feel dizzy, light-headed or faint for up to 24 hours after surgery or while taking pain medication.  If you have these symptoms: sit for a few minutes before standing and have someone assist you when you get up to walk or use the bathroom.      You should rest and relax for the next 24 hours. We recommend you make arrangements to have an adult stay with you for at least 24 hours after your discharge.  Avoid hazardous and strenuous activity.      DO NOT DRIVE any vehicle or operate mechanical equipment for 24 hours following the end of your surgery.  Even though you may feel normal, your reactions may be affected by the medication you have received.      Do not drink alcoholic beverages for 24 hours following surgery.       Slowly progress to your regular diet as you feel able. It's not unusual to feel nauseated and/or vomit after receiving anesthesia.  If you develop these symptoms, drink clear liquids (apple juice, ginger ale, broth, 7-up, etc. ) until you feel better.  If your nausea and vomiting persists for 24 hours, please notify your surgeon.        All narcotic pain medications, along  "with inactivity and anesthesia, can cause constipation. Drinking plenty of liquids and increasing fiber intake will help.      For any questions of a medical nature, call your surgeon.      Do not make important decisions for 24 hours.      If you had general anesthesia, you may have a sore throat for a couple of days related to the breathing tube used during surgery.  You may use Cepacol lozenges to help with this discomfort.  If it worsens or if you develop a fever, contact your surgeon.       If you feel your pain is not well managed with the pain medications prescribed by your surgeon, please contact your surgeon's office to let them know so they can address your concerns.     **If you have questions or concerns about your procedure,   call Dr Hughes at  [phone]**      Pending Results     Date and Time Order Name Status Description    10/22/2018 1152 HEPATITIS C SCREEN REFLEX TO HCV RNA QUANT AND GENOTYPE In process     10/22/2018 1152 HIV ANTIGEN ANTIBODY COMBO In process     10/22/2018 1152 CHLAMYDIA TRACHOMATIS PCR In process     10/22/2018 1152 NEISSERIA GONORRHOEAE PCR In process             Admission Information     Date & Time Provider Department Dept. Phone    10/23/2018 Shahriar Hughes MD [facility] PACU [phone]      Your Vitals Were     Blood Pressure Pulse Temperature Respirations Height Weight    146/84 57 96.8  F (36  C) 16 1.93 m (6' 4\") 103.4 kg (227 lb 14.4 oz)    Pulse Oximetry BMI (Body Mass Index)                97% 27.74 kg/m2          MyChart Information     [facility] gives you secure access to your electronic health record. If you see a primary care provider, you can also send messages to your care team and make appointments. If you have questions, please call your primary care clinic.  If you do not have a primary care provider, please call [phone] and they will assist you.        Care EveryWhere ID     This is your Care EveryWhere ID. This could be used by other " organizations to access your [facility] medical records  [id number]        Equal Access to Services     BROCK ZHENG : Joy Phillips, naga quiroz, gemma martinez, aurelia arriaga. So [facility] [phone].    ATENCIÓN: Si habla español, tiene a dunlap disposición servicios gratuitos de asistencia lingüística. Llame al [phone].    We comply with applicable federal civil rights laws and [place] laws. We do not discriminate on the basis of race, color, national origin, age, disability, sex, sexual orientation, or gender identity.               Review of your medicines      START taking        Dose / Directions    HYDROcodone-acetaminophen 5-325 MG per tablet   Commonly known as:  NORCO   Used for:  Post-op pain        Dose:  1-2 tablet   Take 1-2 tablets by mouth every 4 hours as needed for moderate to severe pain   Quantity:  20 tablet   Refills:  0       senna-docusate 8.6-50 MG per tablet   Commonly known as:  SENOKOT-S;PERICOLACE   Used for:  Post-op pain        Dose:  1-2 tablet   Take 1-2 tablets by mouth 2 times daily   Quantity:  30 tablet   Refills:  0         CONTINUE these medicines which have NOT CHANGED        Dose / Directions    aspirin 81 MG EC tablet        Dose:  81 mg   Take 1 tablet by mouth daily.   Quantity:  90 tablet   Refills:  3       atorvastatin 20 MG tablet   Commonly known as:  LIPITOR   Used for:  Hyperlipidemia LDL goal <160        Dose:  20 mg   Take 1 tablet (20 mg) by mouth daily   Quantity:  90 tablet   Refills:  3       glucosamine-chondroitinoitin 9371-0429 MG/30ML Liqd        Take by mouth daily   Refills:  0       metoprolol succinate 25 MG 24 hr tablet   Commonly known as:  TOPROL-XL   Used for:  Benign essential hypertension        Dose:  25 mg   Take 1 tablet (25 mg) by mouth daily   Quantity:  90 tablet   Refills:  3       Multi-vitamin Tabs tablet        Dose:  1 tablet   Take 1 tablet by mouth daily.   Quantity:  100  tablet   Refills:  3            Where to get your medicines      These medications were sent to [facility] [place], [place] - [address]  [address] [address]     Phone:  [phone]     senna-docusate 8.6-50 MG per tablet         Some of these will need a paper prescription and others can be bought over the counter. Ask your nurse if you have questions.     Bring a paper prescription for each of these medications     HYDROcodone-acetaminophen 5-325 MG per tablet                Protect others around you: Learn how to safely use, store and throw away your medicines at www.disposemymeds.org.        Information about OPIOIDS     PRESCRIPTION OPIOIDS: WHAT YOU NEED TO KNOW   We gave you an opioid (narcotic) pain medicine. It is important to manage your pain, but opioids are not always the best choice. You should first try all the other options your care team gave you. Take this medicine for as short a time (and as few doses) as possible.    Some activities can increase your pain, such as bandage changes or therapy sessions. It may help to take your pain medicine 30 to 60 minutes before these activities. Reduce your stress by getting enough sleep, working on hobbies you enjoy and practicing relaxation or meditation. Talk to your care team about ways to manage your pain beyond prescription opioids.    These medicines have risks:    DO NOT drive when on new or higher doses of pain medicine. These medicines can affect your alertness and reaction times, and you could be arrested for driving under the influence (DUI). If you need to use opioids long-term, talk to your care team about driving.    DO NOT operate heavy machinery    DO NOT do any other dangerous activities while taking these medicines.    DO NOT drink any alcohol while taking these medicines.     If the opioid prescribed includes acetaminophen, DO NOT take with any other medicines that contain acetaminophen. Read all  labels carefully. Look for the word  acetaminophen  or  Tylenol.  Ask your pharmacist if you have questions or are unsure.    You can get addicted to pain medicines, especially if you have a history of addiction (chemical, alcohol or substance dependence). Talk to your care team about ways to reduce this risk.    All opioids tend to cause constipation. Drink plenty of water and eat foods that have a lot of fiber, such as fruits, vegetables, prune juice, apple juice and high-fiber cereal. Take a laxative (Miralax, milk of magnesia, Colace, Senna) if you don t move your bowels at least every other day. Other side effects include upset stomach, sleepiness, dizziness, throwing up, tolerance (needing more of the medicine to have the same effect), physical dependence and slowed breathing.    Store your pills in a secure place, locked if possible. We will not replace any lost or stolen medicine. If you don t finish your medicine, please throw away (dispose) as directed by your pharmacist. The [place] [employer]ution [employer] has more information about safe disposal: https://www.pca.[facility].[place].us/living-green/managing-unwanted-medications             Medication List: This is a list of all your medications and when to take them. Check marks below indicate your daily home schedule. Keep this list as a reference.      Medications           Morning Afternoon Evening Bedtime As Needed    aspirin 81 MG EC tablet   Take 1 tablet by mouth daily.                                atorvastatin 20 MG tablet   Commonly known as:  LIPITOR   Take 1 tablet (20 mg) by mouth daily                                glucosamine-chondroitinoitin 0615-9931 MG/30ML Liqd   Take by mouth daily                                HYDROcodone-acetaminophen 5-325 MG per tablet   Commonly known as:  NORCO   Take 1-2 tablets by mouth every 4 hours as needed for moderate to severe pain   Last time this was given:  1 tablet on 10/23/2018 10:53 AM                                 metoprolol succinate 25 MG 24 hr tablet   Commonly known as:  TOPROL-XL   Take 1 tablet (25 mg) by mouth daily                                Multi-vitamin Tabs tablet   Take 1 tablet by mouth daily.                                senna-docusate 8.6-50 MG per tablet   Commonly known as:  SENOKOT-S;PERICOLACE   Take 1-2 tablets by mouth 2 times daily

## 2018-10-23 NOTE — OP NOTE
General Surgery Operative Note    PREOPERATIVE DIAGNOSIS:  UMBILICAL HERNIA    POSTOPERATIVE DIAGNOSIS:  Same    PROCEDURE:   Procedure(s):  OPEN UMBILICAL HERNIA REPAIR WITH MESH    ANESTHESIA:  MAC      SURGEON:  Shahriar Hughes MD    ASSISTANT:  Madison Verma PA-C. The physician assistant was medically necessary for their expertise in suctioning, and retraction    INDICATIONS:  The patient has an umbilical hernia, this is sore and reducible. The risks and options were reviewed with him, he appeared to understand and wished to proceed    PROCEDURE:  The patient was taken to the operating suite.  The operative area was prepped and draped in a sterile fashion.  Surgeon initiated timeout was acknowledged.   A pro-umbilical incision was made after infiltrating with local anesthetic. The hernia was dissected away from the skin and subcutaneous tissues. The hernia contents were reduced. A retro-fascial plane was developed in all directions for several centimeters under direct vision. A 6cm piece of bilayer mesh was selected. 2-0 PDS sutures were placed at the 12, 3, 6, and 9 O'clock positions going through fascia, mesh, and back up through fascia. The mesh was folded and inserted into the retrofascial position. The sutures were tightened and the mesh was checked to make certain it deployed correctly. The sutures were then tied. The fascia was close primarily with interrupted 1 Prolene sutures, burying the knots. Sponge count was done and was reported as correct. The umbilicus was re-inverted with interrupted 3-0 vicryl. Further anesthetic was infiltrated and the skin closed with 4-0 vicryl and steri-strips.    The skin edges were reapproximated with 4-0 Vicryl and Steri-Strips.  The patient was  awakened and taken to the PACU in stable condition.  At the conclusion of the case, all counts were correct.        INTRAOPERATIVE FINDINGS:  Umbilical hernia    Shahriar Hughes

## 2018-10-23 NOTE — H&P (VIEW-ONLY)
[address] [address]  [phone]  Dept: [phone]    PRE-OP EVALUATION:  Today's date: 10/22/2018    Memo Lai (: 1955) presents for pre-operative evaluation assessment as requested by Shahriar Orellana.  He requires evaluation and anesthesia risk assessment prior to undergoing surgery/procedure for treatment of OPEN UMBILICAL HERNIA REPAIR WITH MESH .      Primary Physician: Mathew Ernst  Type of Anesthesia Anticipated: General    Patient has a Health Care Directive or Living Will:  NO    Preop Questions 10/19/2018   Who is doing your surgery? Dr Shahriar Hughes   What are you having done? Umbilical Hernia   Date of Surgery/Procedure: 10/23/2018   Facility or Hospital where procedure/surgery will be performed: [facility]    1.  Do you have a history of Heart attack, stroke, stent, coronary bypass surgery, or other heart surgery? No   2.  Do you ever have any pain or discomfort in your chest? No   3.  Do you have a history of  Heart Failure? No   4.   Are you troubled by shortness of breath when:  walking on a level surface, or up a slight hill, or at night? No   5.  Do you currently have a cold, bronchitis or other respiratory infection? No   6.  Do you have a cough, shortness of breath, or wheezing? No   7.  Do you sometimes get pains in the calves of your legs when you walk? No   8. Do you or anyone in your family have previous history of blood clots? No   9.  Do you or does anyone in your family have a serious bleeding problem such as prolonged bleeding following surgeries or cuts? No   10. Have you ever had problems with anemia or been told to take iron pills? No   11. Have you had any abnormal blood loss such as black, tarry or bloody stools? No   12. Have you ever had a blood transfusion? No   13. Have you or any of your relatives ever had problems with anesthesia? No   14. Do you have sleep apnea, excessive snoring or  daytime drowsiness? No   15. Do you have any prosthetic heart valves? No   16. Do you have prosthetic joints? No         HPI:     HPI related to upcoming procedure: normal       See problem list for active medical problems.  Problems all longstanding and stable, except as noted/documented.  See ROS for pertinent symptoms related to these conditions.                                                                                                                                                          .    MEDICAL HISTORY:     Patient Active Problem List    Diagnosis Date Noted     Ruptured eardrum 10/14/2013     Priority: Medium     Family history of colon cancer 10/13/2013     Priority: Medium     Hyperlipidemia LDL goal <160 10/08/2013     Priority: Medium     IFG (impaired fasting glucose) 10/08/2013     Priority: Medium     Low HDL (under 40) 10/08/2013     Priority: Medium     Metabolic syndrome X 10/08/2013     Priority: Medium     Hypertriglyceridemia 10/08/2013     Priority: Medium      Past Medical History:   Diagnosis Date     Colon polyp      Family history of colon cancer      High cholesterol      Hypertension      Kidney stone      Ruptured eardrum 10/14/2013     Past Surgical History:   Procedure Laterality Date     COLONOSCOPY  10/11/10    [facility]     COLONOSCOPY  09/25/06    [facility]     COLONOSCOPY N/A 8/12/2016    Procedure: COMBINED COLONOSCOPY, SINGLE OR MULTIPLE BIOPSY/POLYPECTOMY BY BIOPSY;  Surgeon: Jose Carlos Gaona MD;  Location:  GI     COLONOSCOPY N/A 10/13/2017    Procedure: COLONOSCOPY;  colonoscopy;  Surgeon: Jose Carlos Gaona MD;  Location:  GI     ENT SURGERY  2014    [facility] for [facility] infection     EYE SURGERY  2015    Last eye apointment was November 2015     GENITOURINARY SURGERY      kidney stone     ORTHOPEDIC SURGERY      meniscus tear and repair right     Current Outpatient Prescriptions   Medication Sig Dispense Refill     aspirin 81 MG EC tablet  "Take 1 tablet by mouth daily. 90 tablet 3     atorvastatin (LIPITOR) 20 MG tablet Take 1 tablet (20 mg) by mouth daily 90 tablet 3     glucosamine-chondroitinoitin 0366-9037 MG/30ML LIQD Take by mouth daily       metoprolol succinate (TOPROL-XL) 25 MG 24 hr tablet Take 1 tablet (25 mg) by mouth daily 90 tablet 3     multivitamin, therapeutic with minerals (MULTI-VITAMIN) TABS Take 1 tablet by mouth daily. 100 tablet 3     OTC products: None, except as noted above    Allergies   Allergen Reactions     Morphine      Nkda [No Known Drug Allergies]       Latex Allergy: NO    Social History   Substance Use Topics     Smoking status: Former Smoker     Quit date: 8/12/1981     Smokeless tobacco: Never Used     Alcohol use 0.6 oz/week      Comment: Social Drinker     History   Drug Use No       REVIEW OF SYSTEMS:   CONSTITUTIONAL: NEGATIVE for fever, chills, change in weight  ENT/MOUTH: NEGATIVE for ear, mouth and throat problems  RESP: NEGATIVE for significant cough or SOB  CV: NEGATIVE for chest pain, palpitations or peripheral edema    EXAM:   /89 (BP Location: Right arm, Patient Position: Chair, Cuff Size: Adult Large)  Pulse 77  Ht 6' 4\" (1.93 m)  Wt 231 lb (104.8 kg)  SpO2 97%  BMI 28.12 kg/m2    GENERAL APPEARANCE: healthy, alert and no distress     EYES: EOMI,  PERRL     HENT: ear canals and TM's normal and nose and mouth without ulcers or lesions     NECK: no adenopathy, no asymmetry, masses, or scars and thyroid normal to palpation     RESP: lungs clear to auscultation - no rales, rhonchi or wheezes     CV: regular rates and rhythm, normal S1 S2, no S3 or S4 and no murmur, click or rub     ABDOMEN:  soft, nontender, no HSM or masses and bowel sounds normal     MS: extremities normal- no gross deformities noted, no evidence of inflammation in joints, FROM in all extremities.     SKIN: no suspicious lesions or rashes     NEURO: Normal strength and tone, sensory exam grossly normal, mentation intact and " speech normal     PSYCH: mentation appears normal. and affect normal/bright     LYMPHATICS: No cervical adenopathy    DIAGNOSTICS:     EKG: appears normal, NSR, normal axis, normal intervals, no acute ST/T changes c/w ischemia, no LVH by voltage criteria, unchanged from previous tracings  Labs Resulted Today:   Results for orders placed or performed in visit on 09/10/18   CBC with platelets   Result Value Ref Range    WBC 4.8 4.0 - 11.0 10e9/L    RBC Count 4.97 4.4 - 5.9 10e12/L    Hemoglobin 15.9 13.3 - 17.7 g/dL    Hematocrit 46.4 40.0 - 53.0 %    MCV 93 78 - 100 fl    MCH 32.0 26.5 - 33.0 pg    MCHC 34.3 31.5 - 36.5 g/dL    RDW 14.4 10.0 - 15.0 %    Platelet Count 213 150 - 450 10e9/L   Lipid panel reflex to direct LDL Fasting   Result Value Ref Range    Cholesterol 178 <200 mg/dL    Triglycerides 144 <150 mg/dL    HDL Cholesterol 48 >39 mg/dL    LDL Cholesterol Calculated 101 (H) <100 mg/dL    Non HDL Cholesterol 130 (H) <130 mg/dL   Comprehensive metabolic panel   Result Value Ref Range    Sodium 141 133 - 144 mmol/L    Potassium 4.0 3.4 - 5.3 mmol/L    Chloride 109 94 - 109 mmol/L    Carbon Dioxide 25 20 - 32 mmol/L    Anion Gap 7 3 - 14 mmol/L    Glucose 95 70 - 99 mg/dL    Urea Nitrogen 16 7 - 30 mg/dL    Creatinine 0.82 0.66 - 1.25 mg/dL    GFR Estimate >90 >60 mL/min/1.7m2    GFR Estimate If Black >90 >60 mL/min/1.7m2    Calcium 8.6 8.5 - 10.1 mg/dL    Bilirubin Total 0.5 0.2 - 1.3 mg/dL    Albumin 3.7 3.4 - 5.0 g/dL    Protein Total 7.1 6.8 - 8.8 g/dL    Alkaline Phosphatase 81 40 - 150 U/L    ALT 56 0 - 70 U/L    AST 34 0 - 45 U/L   UA reflex to Microscopic and Culture   Result Value Ref Range    Color Urine Yellow     Appearance Urine Clear     Glucose Urine Negative NEG^Negative mg/dL    Bilirubin Urine Negative NEG^Negative    Ketones Urine Negative NEG^Negative mg/dL    Specific Gravity Urine 1.010 1.003 - 1.035    Blood Urine Trace (A) NEG^Negative    pH Urine 6.5 5.0 - 7.0 pH    Protein Albumin  Urine Negative NEG^Negative mg/dL    Urobilinogen Urine 0.2 0.2 - 1.0 EU/dL    Nitrite Urine Negative NEG^Negative    Leukocyte Esterase Urine Negative NEG^Negative    Source Midstream Urine    Hemoglobin A1c   Result Value Ref Range    Hemoglobin A1C 5.4 0 - 5.6 %   Prostate spec antigen screen   Result Value Ref Range    PSA 1.41 0 - 4 ug/L   Urine Microscopic   Result Value Ref Range    WBC Urine 0 - 5 OTO5^0 - 5 /HPF    RBC Urine O - 2 OTO2^O - 2 /HPF    Bacteria Urine Few (A) NEG^Negative /HPF     Labs Drawn and in Process:   Unresulted Labs Ordered in the Past 30 Days of this Admission     No orders found from 8/23/2018 to 10/23/2018.          Recent Labs   Lab Test  09/10/18   1032  07/24/17   0756   HGB  15.9  15.1   PLT  213  213   NA  141  141   POTASSIUM  4.0  4.1   CR  0.82  0.83   A1C  5.4   --         IMPRESSION:   Reason for surgery/procedure: inguinal Hernia  Diagnosis/reason for consult: Pre-operative Evaluation    The proposed surgical procedure is considered INTERMEDIATE risk.    REVISED CARDIAC RISK INDEX  The patient has the following serious cardiovascular risks for perioperative complications such as (MI, PE, VFib and 3  AV Block):  No serious cardiac risks  INTERPRETATION: 0 risks: Class I (very low risk - 0.4% complication rate)    The patient has the following additional risks for perioperative complications:  No identified additional risks      ICD-10-CM    1. Preop general physical exam Z01.818        RECOMMENDATIONS:   (Z01.818) Preop general physical exam  (primary encounter diagnosis)  Comment: you are medically optimized for your upcoming surgery pending EKG.  Noted holding aspirin x 1 week.   Continue metoprolol as per usual dosing  Plan: EKG 12-lead complete w/read - Clinics            (Z23) Need for prophylactic vaccination and inoculation against influenza  Comment:   Plan: FLU VACCINE, SPLIT VIRUS, IM (QUADRIVALENT)         [84754]- >3 YRS, Vaccine Administration,         Initial  [43237]            (Z11.3) Screen for STD (sexually transmitted disease)  Comment: Check urinalysis and labs today   Plan: UA reflex to Microscopic and Culture, NEISSERIA        GONORRHOEA PCR, CHLAMYDIA TRACHOMATIS PCR,         Treponema Abs w Reflex to RPR and Titer, HIV         Antigen Antibody Combo, Hepatitis C Screen         Reflex to HCV RNA Quant and Genotype            (I10) Benign essential hypertension  Comment: blood pressure is excellent  Plan:             Signed Electronically by: Mathew Ernst MD, MD    Copy of this evaluation report is provided to requesting physician.    Bg Preop Guidelines    Revised Cardiac Risk Index

## 2018-10-23 NOTE — IP AVS SNAPSHOT
[facility] [address]    [place] [address]    Phone:  [phone]                                       After Visit Summary   10/23/2018    Memo Lai    MRN: [id number]           After Visit Summary Signature Page     I have received my discharge instructions, and my questions have been answered. I have discussed any challenges I see with this plan with the nurse or doctor.    ..........................................................................................................................................  Patient/Patient Representative Signature      ..........................................................................................................................................  Patient Representative Print Name and Relationship to Patient    ..................................................               ................................................  Date                                   Time    ..........................................................................................................................................  Reviewed by Signature/Title    ...................................................              ..............................................  Date                                               Time          22EPIC Rev 08/18

## 2018-10-23 NOTE — DISCHARGE INSTRUCTIONS
[facility]  Discharge Instructions: Post-Operative Open Umbilical or Ventral Hernia    ACTIVITY    Take frequent, short walks and increase your activity gradually.      Avoid strenuous physical activity or heavy lifting greater than 15lbs. for 3-4 weeks.  You may climb stairs.    You may drive without restrictions when you are not using any prescription pain medication and feel comfortable in a car.    You may return to work/school when you are comfortable without any prescription pain medication.    WOUND CARE    You may remove your bandage and shower 48 hours after the surgery.  Pat your incision dry and leave it open to air.  Re-apply dressing (Band-Aids or gauze/tape) as needed for comfort or drainage.    You may have steri-strips (looks like white tape) on your incision.  You may peel off the steri-strips 2 weeks after your surgery if they have not peeled off on their own.     Do not soak your incision in a tub or pool for 2 weeks.     Do not apply any lotions, creams, or ointments to your incision.    A ridge under your incision is normal and will gradually resolve.    DIET    Start with liquids, then gradually resume your regular diet as tolerated.     Drink plenty of fluids to stay hydrated.    PAIN    Expect some tenderness and discomfort at the incision site(s).  Use the prescribed pain medication at your discretion.  Expect gradual resolution of your pain over several days.    You may take ibuprofen with food (unless you have been told not to) instead of or in addition to your prescribed pain medication.  If you are taking Norco or Percocet, do not take any additional acetaminophen/APAP/Tylenol.      Do not drink alcohol or drive while you are taking pain medications.    You may apply ice to your incisions in 20 minute intervals as needed for the next 48 hours.  After that time, consider switching to heat if you prefer.    EXPECTATIONS    Pain medications can cause  constipation.  Limit use when possible.  Take over the counter stool softener/stimulant, such as Colace or Senna, 1-2 times a day with plenty of water.  You may take a mild over the counter laxative, such as Miralax or a suppository, as needed.  You may take 1 oz. (2 tablespoons) Milk of Magnesia the evening following surgery to encourage bowel movement.    You may discontinue these medications once you are having regular bowel movements and/or are no longer taking your narcotic pain medication.      FOLLOW UP    Our office will contact you approximately 2 weeks to check on your progress and answer any questions you may have.  If you are doing well, you will not need to return for a follow up appointment.  If any concerns are identified over the phone, we will help you make an appointment to see a provider.     If you have not received a phone call, have any questions or concerns, or would like to be seen, please call us at [phone] and ask to speak with our nurse.  We are located at [address] [place] [address].    CALL OUR OFFICE [phone] IF YOU HAVE:     Chills or fever above 101 F.    Increased redness, warmth, or drainage at your incisions.    Significant bleeding.    Pain not relieved by your pain medication or rest.    Increasing pain after the first 48 hours.    Any other concerns or questions.    Revised January 2018  Same Day Surgery Discharge Instructions for  Sedation and General Anesthesia       It's not unusual to feel dizzy, light-headed or faint for up to 24 hours after surgery or while taking pain medication.  If you have these symptoms: sit for a few minutes before standing and have someone assist you when you get up to walk or use the bathroom.      You should rest and relax for the next 24 hours. We recommend you make arrangements to have an adult stay with you for at least 24 hours after your discharge.  Avoid hazardous and strenuous activity.      DO NOT DRIVE  any vehicle or operate mechanical equipment for 24 hours following the end of your surgery.  Even though you may feel normal, your reactions may be affected by the medication you have received.      Do not drink alcoholic beverages for 24 hours following surgery.       Slowly progress to your regular diet as you feel able. It's not unusual to feel nauseated and/or vomit after receiving anesthesia.  If you develop these symptoms, drink clear liquids (apple juice, ginger ale, broth, 7-up, etc. ) until you feel better.  If your nausea and vomiting persists for 24 hours, please notify your surgeon.        All narcotic pain medications, along with inactivity and anesthesia, can cause constipation. Drinking plenty of liquids and increasing fiber intake will help.      For any questions of a medical nature, call your surgeon.      Do not make important decisions for 24 hours.      If you had general anesthesia, you may have a sore throat for a couple of days related to the breathing tube used during surgery.  You may use Cepacol lozenges to help with this discomfort.  If it worsens or if you develop a fever, contact your surgeon.       If you feel your pain is not well managed with the pain medications prescribed by your surgeon, please contact your surgeon's office to let them know so they can address your concerns.     **If you have questions or concerns about your procedure,   call Dr Hughes at  [phone]**

## 2018-10-23 NOTE — ANESTHESIA POSTPROCEDURE EVALUATION
Patient: Memo Lai    Procedure(s):  OPEN UMBILICAL HERNIA REPAIR WITH MESH    Diagnosis:UMBILICAL HERNIA  Diagnosis Additional Information: No value filed.    Anesthesia Type:  General, ETT, RSI    Note:  Anesthesia Post Evaluation    Patient location during evaluation: bedside  Patient participation: Able to fully participate in evaluation  Level of consciousness: awake  Pain management: adequate  Airway patency: patent  Cardiovascular status: acceptable  Respiratory status: acceptable  Hydration status: acceptable  PONV: none     Anesthetic complications: None          Last vitals:  Vitals:    10/23/18 1030 10/23/18 1100 10/23/18 1155   BP: 132/74 146/84 139/79   Pulse: 57 57    Resp:  16 18   Temp:  36  C (96.8  F)    SpO2:  97% 98%         Electronically Signed By: Raulito Avila DO, DO  October 23, 2018  1:22 PM

## 2018-10-23 NOTE — ANESTHESIA POSTPROCEDURE EVALUATION
Patient: Memo Lai    Procedure(s):  OPEN UMBILICAL HERNIA REPAIR WITH MESH    Diagnosis:UMBILICAL HERNIA  Diagnosis Additional Information: No value filed.    Anesthesia Type:  MAC    Note:  Anesthesia Post Evaluation    Patient location during evaluation: bedside  Patient participation: Able to fully participate in evaluation  Level of consciousness: awake  Pain management: adequate  Airway patency: patent  Cardiovascular status: acceptable  Respiratory status: acceptable  Hydration status: acceptable  PONV: none     Anesthetic complications: None          Last vitals:  Vitals:    10/23/18 1030 10/23/18 1100 10/23/18 1155   BP: 132/74 146/84 139/79   Pulse: 57 57    Resp:  16 18   Temp:  36  C (96.8  F)    SpO2:  97% 98%         Electronically Signed By: Raulito Avila DO, DO  October 23, 2018  1:23 PM

## 2018-10-23 NOTE — BRIEF OP NOTE
General Surgery Brief Operative Note    Pre-operative diagnosis: UMBILICAL HERNIA   Post-operative diagnosis Same   Procedure: Procedure(s):  OPEN UMBILICAL HERNIA REPAIR WITH MESH    Surgeon(s), Assistant(s): Surgeon(s) and Role:     * Shahriar Hughes MD - Primary     * Madison Verma PA-C - Assisting   Estimated blood loss: 2 mL   Drains: None   Specimens: * No specimens in log *   Findings: See postop diagnosis   Condition: Stable   Comments:      Madison Verma PA-C See dictated operative report for full details

## 2018-10-23 NOTE — ANESTHESIA PREPROCEDURE EVALUATION
Anesthesia Evaluation     .             ROS/MED HX    ENT/Pulmonary:     (+)tobacco use, Past use , . .    Neurologic:  - neg neurologic ROS     Cardiovascular:     (+) Dyslipidemia, hypertension----. : . . . :. .       METS/Exercise Tolerance:     Hematologic:  - neg hematologic  ROS       Musculoskeletal:  - neg musculoskeletal ROS       GI/Hepatic:     (+) Other GI/Hepatic Umbilical hernia     (-) GERD   Renal/Genitourinary:     (+) Nephrolithiasis ,       Endo:         Psychiatric:  - neg psychiatric ROS       Infectious Disease:  - neg infectious disease ROS       Malignancy:         Other:                     Physical Exam  Normal systems: cardiovascular, pulmonary and dental    Airway   Mallampati: II  TM distance: >3 FB  Neck ROM: full    Dental     Cardiovascular       Pulmonary                     Anesthesia Plan      History & Physical Review  History and physical reviewed and following examination; no interval change.    ASA Status:  2 .    NPO Status:  > 8 hours    Plan for MAC Reason for MAC:  Deep or markedly invasive procedure (G8)  PONV prophylaxis:  Ondansetron (or other 5HT-3) and Dexamethasone or Solumedrol       Postoperative Care  Postoperative pain management:  IV analgesics.      Consents  Anesthetic plan, risks, benefits and alternatives discussed with:  Patient..        Procedure: Procedure(s):  OPEN UMBILICAL HERNIA REPAIR WITH MESH  Preop diagnosis: UMBILICAL HERNIA    Allergies   Allergen Reactions     Morphine Nausea and Vomiting     Nkda [No Known Drug Allergies]      Past Medical History:   Diagnosis Date     Colon polyp      Family history of colon cancer      High cholesterol      Hypertension      Kidney stone      Ruptured eardrum 10/14/2013     Past Surgical History:   Procedure Laterality Date     COLONOSCOPY  10/11/10    [facility]     COLONOSCOPY  09/25/06    [facility] Green     COLONOSCOPY N/A 8/12/2016    Procedure: COMBINED COLONOSCOPY, SINGLE OR MULTIPLE  BIOPSY/POLYPECTOMY BY BIOPSY;  Surgeon: Jose Carlos Gaona MD;  Location:  GI     COLONOSCOPY N/A 10/13/2017    Procedure: COLONOSCOPY;  colonoscopy;  Surgeon: Jose Carlos Gaona MD;  Location:  GI     ENT SURGERY  2014    [facility]aw [facility] for ear infection     EYE SURGERY  2015    Last eye apointment was November 2015     GENITOURINARY SURGERY      kidney stone     ORTHOPEDIC SURGERY      meniscus tear and repair right     Social History   Substance Use Topics     Smoking status: Former Smoker     Quit date: 8/12/1981     Smokeless tobacco: Never Used     Alcohol use 0.6 oz/week      Comment: Social Drinker     Prior to Admission medications    Medication Sig Start Date End Date Taking? Authorizing Provider   aspirin 81 MG EC tablet Take 1 tablet by mouth daily. 3/4/13   Mathew Ernst MD   atorvastatin (LIPITOR) 20 MG tablet Take 1 tablet (20 mg) by mouth daily 9/11/18   Mathew Ernst MD   glucosamine-chondroitinoitin 2119-4032 MG/30ML LIQD Take by mouth daily    Reported, Patient   metoprolol succinate (TOPROL-XL) 25 MG 24 hr tablet Take 1 tablet (25 mg) by mouth daily 9/11/18   Mathew Ernst MD   multivitamin, therapeutic with minerals (MULTI-VITAMIN) TABS Take 1 tablet by mouth daily. 3/4/13   Mathew Ernst MD     No current Epic-ordered facility-administered medications on file.      Current Outpatient Prescriptions Ordered in Epic   Medication     aspirin 81 MG EC tablet     atorvastatin (LIPITOR) 20 MG tablet     glucosamine-chondroitinoitin 2338-0882 MG/30ML LIQD     metoprolol succinate (TOPROL-XL) 25 MG 24 hr tablet     multivitamin, therapeutic with minerals (MULTI-VITAMIN) TABS       Wt Readings from Last 1 Encounters:   10/22/18 104.8 kg (231 lb)     Temp Readings from Last 1 Encounters:   09/11/18 36.5  C (97.7  F) (Oral)     BP Readings from Last 6 Encounters:   10/22/18 132/89   09/21/18 124/80   09/11/18 126/81   01/26/18 136/82   12/01/17 (!)  150/91   10/13/17 125/84     Pulse Readings from Last 4 Encounters:   10/22/18 77   09/21/18 78   09/11/18 71   12/01/17 71     Resp Readings from Last 1 Encounters:   10/13/17 9     SpO2 Readings from Last 1 Encounters:   10/22/18 97%     Recent Labs   Lab Test  09/10/18   1032  07/24/17   0756   NA  141  141   POTASSIUM  4.0  4.1   CHLORIDE  109  108   CO2  25  27   ANIONGAP  7  6   GLC  95  93   BUN  16  14   CR  0.82  0.83   GLADYS  8.6  9.1     Recent Labs   Lab Test  09/10/18   1032  07/24/17   0756   AST  34  30   ALT  56  51   ALKPHOS  81  74   BILITOTAL  0.5  0.6     Recent Labs   Lab Test  09/10/18   1032  07/24/17   0756   WBC  4.8  5.2   HGB  15.9  15.1   PLT  213  213     No results for input(s): ABO, RH in the last 71185 hours.  No results for input(s): INR, PTT in the last 41033 hours.   No results for input(s): TROPI in the last 81606 hours.  No results for input(s): PH, PCO2, PO2, HCO3 in the last 99802 hours.  No results for input(s): HCG in the last 14221 hours.  No results found for this or any previous visit (from the past 744 hour(s)).    RECENT LABS:   ECG:   ECHO:                     .

## 2018-10-23 NOTE — ANESTHESIA CARE TRANSFER NOTE
Patient: Memo Lai    Procedure(s):  OPEN UMBILICAL HERNIA REPAIR WITH MESH    Diagnosis: UMBILICAL HERNIA  Diagnosis Additional Information: No value filed.    Anesthesia Type:   MAC    Note:  Airway :Nasal Cannula  Patient transferred to:PACU  Comments: Pt exhibits spont resps, all monitors and alarms on in pacu, report given to RN, vss.Handoff Report: Identifed the Patient, Identified the Reponsible Provider, Reviewed the pertinent medical history, Discussed the surgical course, Reviewed Intra-OP anesthesia mangement and issues during anesthesia, Set expectations for post-procedure period and Allowed opportunity for questions and acknowledgement of understanding      Vitals: (Last set prior to Anesthesia Care Transfer)    CRNA VITALS  10/23/2018 0927 - 10/23/2018 1002      10/23/2018             Pulse: 65    SpO2: 94 %    Resp Rate (set): 10                Electronically Signed By: KATELYN Brennan CRNA  October 23, 2018  10:02 AM

## 2018-10-24 LAB
C TRACH DNA SPEC QL NAA+PROBE: NEGATIVE
N GONORRHOEA DNA SPEC QL NAA+PROBE: NEGATIVE
SPECIMEN SOURCE: NORMAL
SPECIMEN SOURCE: NORMAL

## 2018-10-28 NOTE — PROGRESS NOTES
Nixon Hampton,    I have had the opportunity to review your recent results and an interpretation is as follows:  All screening tests returned negative for infection      Sincerely,  Mathew Ernst MD

## 2018-11-01 ENCOUNTER — TELEPHONE (OUTPATIENT)
Dept: SURGERY | Facility: CLINIC | Age: 63
End: 2018-11-01

## 2018-11-01 NOTE — TELEPHONE ENCOUNTER
[facility]  Post op call note - Open Umbilical Hernia Repair    Memo Lai was called for an update regarding his recovery.  He underwent an open umbilical hernia repair by Dr. Hughes on 10/23/18.  Today he tells me he is doing well and denies any complaints.  He currently does not need any pain medications.  He is eating a normal diet and his bowels are regular.   The patient states he is slowly resuming normal activity.  He states his wounds are healing well.  The patient denies fever/chills, n/v/d, abdominal pain, changes in urination or BM, or wound concerns.     He was instructed to remove steri strips and continue to keep wounds clean.  He was advised to advance his activity as tolerated with no heavy lifting > 20 lbs or strenuous exercise x 1-2 weeks.  The patient states all of his questions were answered and he understands our discussion.  He agrees to follow up as needed and to call our office with any concerns.    Madison Verma PA-C  Please route or send letter to:  Primary Care Provider (PCP)

## 2018-12-10 ENCOUNTER — MYC MEDICAL ADVICE (OUTPATIENT)
Dept: FAMILY MEDICINE | Facility: CLINIC | Age: 63
End: 2018-12-10

## 2018-12-10 DIAGNOSIS — N52.9 ERECTILE DYSFUNCTION, UNSPECIFIED ERECTILE DYSFUNCTION TYPE: Primary | ICD-10-CM

## 2018-12-10 NOTE — TELEPHONE ENCOUNTER
Dr Ernst,    See [employer] message and let us know how we can assist.  I pended pharmacy.     Kirstie Billingsley RN- Triage [employer]

## 2018-12-11 RX ORDER — TADALAFIL 5 MG/1
5 TABLET ORAL DAILY PRN
Qty: 12 TABLET | Refills: 11 | Status: SHIPPED | OUTPATIENT
Start: 2018-12-11 | End: 2019-09-25

## 2018-12-12 ENCOUNTER — TELEPHONE (OUTPATIENT)
Dept: FAMILY MEDICINE | Facility: CLINIC | Age: 63
End: 2018-12-12

## 2018-12-12 NOTE — TELEPHONE ENCOUNTER
Prior Authorization Retail Medication Request    Medication/Dose: Tadalafil 5 mg  ICD code (if different than what is on RX):  n52.9  Previously Tried and Failed:  Erectile dysfunction, unspecified erectile dysfunction type   Rationale:  failed    Insurance Name:  [employer] [place]   Insurance ID:  [id number]      Pharmacy Information (if different than what is on RX)  [facility]  [facility]  Phone:  [phone]

## 2018-12-14 NOTE — TELEPHONE ENCOUNTER
The History and Physical has been reviewed, the patient has been examined and no changes have occurred in the patient's condition since the H & P was completed.        This PA request was submitted to the insurance by the Central Prior Authorization Team.  If you have any questions in regards to this request, we can be reached at [phone].     Thanks    PA Initiation    Medication: Tadalafil 5 mg  Insurance Company: [employer] - Phone [phone] Fax [phone]  Pharmacy Filling the Rx: [facility] [address] [place] [address] [place] [address]  Filling Pharmacy Phone: [phone]  Filling Pharmacy Fax:    Start Date: 12/14/2018

## 2018-12-17 NOTE — TELEPHONE ENCOUNTER
PRIOR AUTHORIZATION DENIED - Medication Excluded     Medication: Tadalafil 5 mg - DENIED     Denial Date: 12/14/2018    Denial Rational: All ED medications are excluded from coverage and will not be covered by insurance; the patient will have to pay out of pocket.         Appeal Information: Appeal not applicable for excluded medications.

## 2018-12-26 NOTE — TELEPHONE ENCOUNTER
Spoke with Rahul in regards to Tadalafil denial from insurance company. Discussed with patient about website [employer] to find discounted price for medication that would be done without insurance company. Patient will look into this as it is an affordable method for him and would be able to still obtain medication as needed. Patient will call back to clinic if he has any questions in regards to all of this information.    Gustavo Menendez, BAIRON on 12/26/2018 at 2:13 PM

## 2019-09-11 ENCOUNTER — DOCUMENTATION ONLY (OUTPATIENT)
Dept: FAMILY MEDICINE | Facility: CLINIC | Age: 64
End: 2019-09-11

## 2019-09-11 DIAGNOSIS — E78.5 HYPERLIPIDEMIA LDL GOAL <160: ICD-10-CM

## 2019-09-11 DIAGNOSIS — Z00.00 ROUTINE GENERAL MEDICAL EXAMINATION AT A HEALTH CARE FACILITY: ICD-10-CM

## 2019-09-11 DIAGNOSIS — R73.01 IFG (IMPAIRED FASTING GLUCOSE): Primary | ICD-10-CM

## 2019-09-24 DIAGNOSIS — E78.5 HYPERLIPIDEMIA LDL GOAL <160: ICD-10-CM

## 2019-09-24 DIAGNOSIS — R73.01 IFG (IMPAIRED FASTING GLUCOSE): ICD-10-CM

## 2019-09-24 DIAGNOSIS — Z00.00 ROUTINE GENERAL MEDICAL EXAMINATION AT A HEALTH CARE FACILITY: ICD-10-CM

## 2019-09-24 LAB
ALBUMIN SERPL-MCNC: 3.7 G/DL (ref 3.4–5)
ALP SERPL-CCNC: 62 U/L (ref 40–150)
ALT SERPL W P-5'-P-CCNC: 42 U/L (ref 0–70)
ANION GAP SERPL CALCULATED.3IONS-SCNC: 3 MMOL/L (ref 3–14)
AST SERPL W P-5'-P-CCNC: 22 U/L (ref 0–45)
BILIRUB SERPL-MCNC: 0.4 MG/DL (ref 0.2–1.3)
BUN SERPL-MCNC: 18 MG/DL (ref 7–30)
CALCIUM SERPL-MCNC: 8.5 MG/DL (ref 8.5–10.1)
CHLORIDE SERPL-SCNC: 109 MMOL/L (ref 94–109)
CHOLEST SERPL-MCNC: 169 MG/DL
CO2 SERPL-SCNC: 29 MMOL/L (ref 20–32)
CREAT SERPL-MCNC: 0.78 MG/DL (ref 0.66–1.25)
ERYTHROCYTE [DISTWIDTH] IN BLOOD BY AUTOMATED COUNT: 14.4 % (ref 10–15)
GFR SERPL CREATININE-BSD FRML MDRD: >90 ML/MIN/{1.73_M2}
GLUCOSE SERPL-MCNC: 97 MG/DL (ref 70–99)
HBA1C MFR BLD: 5 % (ref 0–5.6)
HCT VFR BLD AUTO: 44.2 % (ref 40–53)
HDLC SERPL-MCNC: 53 MG/DL
HGB BLD-MCNC: 15.2 G/DL (ref 13.3–17.7)
LDLC SERPL CALC-MCNC: 94 MG/DL
MCH RBC QN AUTO: 33.5 PG (ref 26.5–33)
MCHC RBC AUTO-ENTMCNC: 34.4 G/DL (ref 31.5–36.5)
MCV RBC AUTO: 97 FL (ref 78–100)
NONHDLC SERPL-MCNC: 116 MG/DL
PLATELET # BLD AUTO: 215 10E9/L (ref 150–450)
POTASSIUM SERPL-SCNC: 4 MMOL/L (ref 3.4–5.3)
PROT SERPL-MCNC: 6.5 G/DL (ref 6.8–8.8)
PSA SERPL-ACNC: 1.48 UG/L (ref 0–4)
RBC # BLD AUTO: 4.54 10E12/L (ref 4.4–5.9)
SODIUM SERPL-SCNC: 141 MMOL/L (ref 133–144)
TRIGL SERPL-MCNC: 111 MG/DL
WBC # BLD AUTO: 5.3 10E9/L (ref 4–11)

## 2019-09-24 PROCEDURE — 80061 LIPID PANEL: CPT | Performed by: INTERNAL MEDICINE

## 2019-09-24 PROCEDURE — 80053 COMPREHEN METABOLIC PANEL: CPT | Performed by: INTERNAL MEDICINE

## 2019-09-24 PROCEDURE — 36415 COLL VENOUS BLD VENIPUNCTURE: CPT | Performed by: INTERNAL MEDICINE

## 2019-09-24 PROCEDURE — 83036 HEMOGLOBIN GLYCOSYLATED A1C: CPT | Performed by: INTERNAL MEDICINE

## 2019-09-24 PROCEDURE — G0103 PSA SCREENING: HCPCS | Performed by: INTERNAL MEDICINE

## 2019-09-24 PROCEDURE — 85027 COMPLETE CBC AUTOMATED: CPT | Performed by: INTERNAL MEDICINE

## 2019-09-25 ENCOUNTER — OFFICE VISIT (OUTPATIENT)
Dept: FAMILY MEDICINE | Facility: CLINIC | Age: 64
End: 2019-09-25
Payer: COMMERCIAL

## 2019-09-25 VITALS
TEMPERATURE: 98 F | WEIGHT: 218.1 LBS | DIASTOLIC BLOOD PRESSURE: 73 MMHG | HEART RATE: 59 BPM | HEIGHT: 76 IN | SYSTOLIC BLOOD PRESSURE: 129 MMHG | OXYGEN SATURATION: 100 % | BODY MASS INDEX: 26.56 KG/M2

## 2019-09-25 DIAGNOSIS — R73.01 IFG (IMPAIRED FASTING GLUCOSE): ICD-10-CM

## 2019-09-25 DIAGNOSIS — Z23 NEED FOR PROPHYLACTIC VACCINATION AND INOCULATION AGAINST INFLUENZA: ICD-10-CM

## 2019-09-25 DIAGNOSIS — E78.5 HYPERLIPIDEMIA LDL GOAL <160: ICD-10-CM

## 2019-09-25 DIAGNOSIS — N52.9 ERECTILE DYSFUNCTION, UNSPECIFIED ERECTILE DYSFUNCTION TYPE: ICD-10-CM

## 2019-09-25 DIAGNOSIS — Z00.00 ROUTINE GENERAL MEDICAL EXAMINATION AT A HEALTH CARE FACILITY: Primary | ICD-10-CM

## 2019-09-25 DIAGNOSIS — I10 BENIGN ESSENTIAL HYPERTENSION: ICD-10-CM

## 2019-09-25 DIAGNOSIS — E78.1 HYPERTRIGLYCERIDEMIA: ICD-10-CM

## 2019-09-25 PROCEDURE — 90471 IMMUNIZATION ADMIN: CPT | Performed by: INTERNAL MEDICINE

## 2019-09-25 PROCEDURE — 99396 PREV VISIT EST AGE 40-64: CPT | Mod: 25 | Performed by: INTERNAL MEDICINE

## 2019-09-25 PROCEDURE — 90682 RIV4 VACC RECOMBINANT DNA IM: CPT | Performed by: INTERNAL MEDICINE

## 2019-09-25 RX ORDER — TADALAFIL 5 MG/1
5 TABLET ORAL DAILY PRN
Qty: 12 TABLET | Refills: 11 | Status: SHIPPED | OUTPATIENT
Start: 2019-09-25

## 2019-09-25 RX ORDER — ATORVASTATIN CALCIUM 20 MG/1
20 TABLET, FILM COATED ORAL DAILY
Qty: 90 TABLET | Refills: 3 | Status: SHIPPED | OUTPATIENT
Start: 2019-09-25

## 2019-09-25 RX ORDER — METOPROLOL SUCCINATE 25 MG/1
25 TABLET, EXTENDED RELEASE ORAL DAILY
Qty: 90 TABLET | Refills: 3 | Status: SHIPPED | OUTPATIENT
Start: 2019-09-25

## 2019-09-25 ASSESSMENT — MIFFLIN-ST. JEOR: SCORE: 1885.8

## 2019-09-25 NOTE — PROGRESS NOTES
"SUBJECTIVE:   Memo Lai is a 64 year old male who presents for Preventive Visit.  {PVP to remind patient that this is not necessarily a physical exam; physical exam may or may not be done:150277::\"click delete button to remove this line now\"}    Are you in the first 12 months of your Medicare coverage?  No    Healthy Habits:     Getting at least 3 servings of Calcium per day:  Yes    Bi-annual eye exam:  Yes    Dental care twice a year:  Yes    Sleep apnea or symptoms of sleep apnea:  None    Diet:  Regular (no restrictions)    Frequency of exercise:  2-3 days/week    Duration of exercise:  15-30 minutes    Taking medications regularly:  Yes    Medication side effects:  None    PHQ-2 Total Score: 0    Additional concerns today:  No    Do you feel safe in your environment? Yes    Do you have a Health Care Directive? No: Advance care planning reviewed with patient; information given to patient to review.    {Hearing Test Done (Optional):809046}  Fall risk       Cognitive Screening   1) Repeat 3 items (Leader, Season, Table)    2) Clock draw:  :Normal  3) 3 item recall: Recalls 2 objects   Results: NORMAL clock, 1-2 items recalled: COGNITIVE IMPAIRMENT LESS LIKELY    Mini-CogTM Copyright [employer]. Licensed by the author for use in [facility]; reprinted with permission (soob@.[facility]). All rights reserved.      Do you have sleep apnea, excessive snoring or daytime drowsiness?: no    Reviewed and updated as needed this visit by clinical staff         Reviewed and updated as needed this visit by Provider        Social History     Tobacco Use     Smoking status: Former Smoker     Last attempt to quit: 1981     Years since quittin.1     Smokeless tobacco: Never Used   Substance Use Topics     Alcohol use: Yes     Alcohol/week: 1.0 standard drinks     Comment: Social Drinker     {Rooming Staff- Complete this question if Prescreen response is not shown below for today's visit. If you drink " "alcohol do you typically have >3 drinks per day or >7 drinks per week? (Optional):985693}    Alcohol Use 9/23/2019   Prescreen: >3 drinks/day or >7 drinks/week? Yes   Prescreen: >3 drinks/day or >7 drinks/week? -   AUDIT SCORE  5   {add AUDIT responses (Optional) (A score of 7 for adult men is an indication of hazardous drinking; a score of 8 or more is an indication of an alcohol use disorder.  A score of 7 or more for adult women is an indication of hazardous drinking or an alchohol use disorder):558029}    {Outside tests to abstract? :115908}    {additional problems to add (Optional):460095}    Current providers sharing in care for this patient include: {Rooming staff:  Please update Care Team in Rooming Activity, refresh this note and then delete this statement}  Patient Care Team:  Mathew Ernst MD as PCP - General (Internal Medicine)  Mathew Ernst MD as Assigned PCP    The following health maintenance items are reviewed in Epic and correct as of today:  Health Maintenance   Topic Date Due     ADVANCE CARE PLANNING  1955     ZOSTER IMMUNIZATION (2 of 3) 09/20/2017     INFLUENZA VACCINE (1) 09/01/2019     PREVENTIVE CARE VISIT  10/22/2019     DTAP/TDAP/TD IMMUNIZATION (6 - Td) 09/22/2020     CMP  09/24/2020     LIPID  09/24/2020     PSA  09/24/2020     CBC  09/24/2020     COLONOSCOPY  10/13/2022     HEPATITIS C SCREENING  Completed     HIV SCREENING  Completed     PHQ-2  Completed     IPV IMMUNIZATION  Aged Out     MENINGITIS IMMUNIZATION  Aged Out     {Chronicprobdata (optional):825118}  {Decision Support (Optional):854002}    Review of Systems  {ROS COMP (Optional):307304}    OBJECTIVE:   There were no vitals taken for this visit. Estimated body mass index is 27.74 kg/m  as calculated from the following:    Height as of 10/23/18: 1.93 m (6' 4\").    Weight as of 10/23/18: 103.4 kg (227 lb 14.4 oz).  Physical Exam  {Exam (Optional) :148371}    {Diagnostic Test Results " "(Optional):873522::\"Diagnostic Test Results:\",\"Labs reviewed in Epic\"}    ASSESSMENT / PLAN:   {Diag Picklist:898166}    End of Life Planning:  Patient currently has an advanced directive: { :207783}    COUNSELING:  {Medicare Counselin}    Estimated body mass index is 27.74 kg/m  as calculated from the following:    Height as of 10/23/18: 1.93 m (6' 4\").    Weight as of 10/23/18: 103.4 kg (227 lb 14.4 oz).    {Weight Management Plan (ACO) Complete if BMI is abnormal-  Ages 18-64  BMI >24.9.  Age 65+ with BMI <23 or >30 (Optional):535082}     reports that he quit smoking about 38 years ago. He has never used smokeless tobacco.  {Tobacco Cessation -- Complete if patient is a smoker (Optional):771220}    Appropriate preventive services were discussed with this patient, including applicable screening as appropriate for cardiovascular disease, diabetes, osteopenia/osteoporosis, and glaucoma.  As appropriate for age/gender, discussed screening for colorectal cancer, prostate cancer, breast cancer, and cervical cancer. Checklist reviewing preventive services available has been given to the patient.    Reviewed patients plan of care and provided an AVS. The {CarePlan:633182} for Memo meets the Care Plan requirement. This Care Plan has been established and reviewed with the {PATIENT, FAMILY MEMBER, CAREGIVER:428280}.    Counseling Resources:  ATP IV Guidelines  Pooled Cohorts Equation Calculator  Breast Cancer Risk Calculator  FRAX Risk Assessment  ICSI Preventive Guidelines  Dietary Guidelines for Americans,   [email or web address]'s MyPlate  ASA Prophylaxis  Lung CA Screening    Mathew Ernst MD, MD  [facility]    Identified Health Risks:  "

## 2019-09-25 NOTE — PROGRESS NOTES
SUBJECTIVE:   CC: Memo Lai is an 64 year old male who presents for preventative health visit.     Healthy Habits:     Getting at least 3 servings of Calcium per day:  Yes    Bi-annual eye exam:  Yes    Dental care twice a year:  Yes    Sleep apnea or symptoms of sleep apnea:  None    Diet:  Regular (no restrictions)    Frequency of exercise:  2-3 days/week    Duration of exercise:  15-30 minutes    Taking medications regularly:  Yes    Barriers to taking medications:  None    Medication side effects:  None    PHQ-2 Total Score: 0    Additional concerns today:  No      Today's PHQ-2 Score:   PHQ-2 (  [employer]) 2019   Q1: Little interest or pleasure in doing things 0   Q2: Feeling down, depressed or hopeless 0   PHQ-2 Score 0   Q1: Little interest or pleasure in doing things -   Q2: Feeling down, depressed or hopeless -   PHQ-2 Score -       Abuse: Current or Past(Physical, Sexual or Emotional)- No  Do you feel safe in your environment? No    Social History     Tobacco Use     Smoking status: Former Smoker     Packs/day: 0.00     Last attempt to quit: 1981     Years since quittin.1     Smokeless tobacco: Never Used   Substance Use Topics     Alcohol use: Yes     Alcohol/week: 1.0 standard drinks     Comment: 2 drinks per night     If you drink alcohol do you typically have >3 drinks per day or >7 drinks per week? No    Alcohol Use 2019   Prescreen: >3 drinks/day or >7 drinks/week? Yes   Prescreen: >3 drinks/day or >7 drinks/week? -   AUDIT SCORE  5     AUDIT - Alcohol Use Disorders Identification Test - Reproduced from the World Health Organization Audit 2001 (Second Edition) 2019   1.  How often do you have a drink containing alcohol? 4 or more times a week   2.  How many drinks containing alcohol do you have on a typical day when you are drinking? 1 or 2   3.  How often do you have five or more drinks on one occasion? Less than monthly   4.  How often during the last year have  you found that you were not able to stop drinking once you had started? Never   5.  How often during the last year have you failed to do what was normally expected of you because of drinking? Never   6.  How often during the last year have you needed a first drink in the morning to get yourself going after a heavy drinking session? Never   7.  How often during the last year have you had a feeling of guilt or remorse after drinking? Never   8.  How often during the last year have you been unable to remember what happened the night before because of your drinking? Never   9.  Have you or someone else been injured because of your drinking? No   10. Has a relative, friend, doctor or other health care worker been concerned about your drinking or suggested you cut down? No   TOTAL SCORE 5       Last PSA:   PSA   Date Value Ref Range Status   09/24/2019 1.48 0 - 4 ug/L Final     Comment:     Assay Method:  Chemiluminescence using Siemens Vista analyzer       Reviewed orders with patient. Reviewed health maintenance and updated orders accordingly - Yes  Labs reviewed in EPIC    Reviewed and updated as needed this visit by clinical staff  Tobacco  Allergies  Meds  Soc Hx        Reviewed and updated as needed this visit by Provider        Past Medical History:   Diagnosis Date     Colon polyp      Family history of colon cancer      High cholesterol      Hypertension      Kidney stone      Ruptured eardrum 10/14/2013        Review of Systems  CONSTITUTIONAL: NEGATIVE for fever, chills, change in weight  INTEGUMENTARY/SKIN: NEGATIVE for worrisome rashes, moles or lesions  EYES: NEGATIVE for vision changes or irritation  ENT: NEGATIVE for ear, mouth and throat problems  RESP: NEGATIVE for significant cough or SOB  CV: NEGATIVE for chest pain, palpitations or peripheral edema  GI: NEGATIVE for nausea, abdominal pain, heartburn, or change in bowel habits   male: negative for dysuria, hematuria, decreased urinary stream,  "erectile dysfunction, urethral discharge  MUSCULOSKELETAL: NEGATIVE for significant arthralgias or myalgia  NEURO: NEGATIVE for weakness, dizziness or paresthesias  PSYCHIATRIC: NEGATIVE for changes in mood or affect    OBJECTIVE:   /73 (BP Location: Left arm, Cuff Size: Adult Large)   Pulse 59   Temp 98  F (36.7  C) (Oral)   Ht 1.93 m (6' 4\")   Wt 98.9 kg (218 lb 1.6 oz)   SpO2 100%   BMI 26.55 kg/m      Physical Exam  GENERAL: healthy, alert and no distress  EYES: Eyes grossly normal to inspection, PERRL and conjunctivae and sclerae normal  HENT: ear canals and TM's normal, nose and mouth without ulcers or lesions  NECK: no adenopathy, no asymmetry, masses, or scars and thyroid normal to palpation  RESP: lungs clear to auscultation - no rales, rhonchi or wheezes  CV: regular rate and rhythm, normal S1 S2, no S3 or S4, no murmur, click or rub, no peripheral edema and peripheral pulses strong  ABDOMEN: soft, nontender, no hepatosplenomegaly, no masses and bowel sounds normal  MS: no gross musculoskeletal defects noted, no edema  SKIN: no suspicious lesions or rashes  NEURO: Normal strength and tone, mentation intact and speech normal  PSYCH: mentation appears normal, affect normal/bright    Diagnostic Test Results:  Labs reviewed in Epic    ASSESSMENT/PLAN:     Patient Instructions   (E78.5) Hyperlipidemia LDL goal <160  (primary encounter diagnosis)  Comment: recent fasting lipid panel was excellent - conitnue atorvastatin   Plan: atorvastatin (LIPITOR) 20 MG tablet            (E78.1) Hypertriglyceridemia  Comment: as above   Plan:     (R73.01) IFG (impaired fasting glucose)  Comment: Hemoglobin A1c also checked and shows excellent average blood glucose   Plan:     (I10) Benign essential hypertension  Comment: blood pressure is doing great  Plan: metoprolol succinate ER (TOPROL-XL) 25 MG 24 hr        tablet            (N52.9) Erectile dysfunction, unspecified erectile dysfunction type  Comment: blood " "pressure is well controlled and we will tadalafil  Plan: tadalafil (CIALIS) 5 MG tablet                     COUNSELING:   Reviewed preventive health counseling, as reflected in patient instructions    Estimated body mass index is 26.55 kg/m  as calculated from the following:    Height as of this encounter: 1.93 m (6' 4\").    Weight as of this encounter: 98.9 kg (218 lb 1.6 oz).          reports that he quit smoking about 38 years ago. He smoked 0.00 packs per day. He has never used smokeless tobacco.      Counseling Resources:  ATP IV Guidelines  Pooled Cohorts Equation Calculator  FRAX Risk Assessment  ICSI Preventive Guidelines  Dietary Guidelines for Americans, 2010  USDA's MyPlate  ASA Prophylaxis  Lung CA Screening    Mathew Ernst MD, MD  [facility]  "

## 2019-09-25 NOTE — PATIENT INSTRUCTIONS
(E78.5) Hyperlipidemia LDL goal <160  (primary encounter diagnosis)  Comment: recent fasting lipid panel was excellent - conitnue atorvastatin   Plan: atorvastatin (LIPITOR) 20 MG tablet            (E78.1) Hypertriglyceridemia  Comment: as above   Plan:     (R73.01) IFG (impaired fasting glucose)  Comment: Hemoglobin A1c also checked and shows excellent average blood glucose   Plan:     (I10) Benign essential hypertension  Comment: blood pressure is doing great  Plan: metoprolol succinate ER (TOPROL-XL) 25 MG 24 hr        tablet            (N52.9) Erectile dysfunction, unspecified erectile dysfunction type  Comment: blood pressure is well controlled and we will tadalafil  Plan: tadalafil (CIALIS) 5 MG tablet

## 2019-09-27 NOTE — RESULT ENCOUNTER NOTE
Nixon Hampton,    I had the opportunity to review your recent labs and a summary of your labs reads as follows:    Your complete blood counts show no sign of anemia, normal white blood cell count and platelets.  Your comprehensive metabolic panel showed normal renal function, normal liver function, and normal fasting blood glucose indicating no evidence of diabetes mellitus.  Your hemoglobin A1c also shows excellent average blood glucose control  Your fasting lipid panel show  - normal HDL (good) cholesterol -as your goal is greater than 40  - low LDL (bad) cholesterol as your goal is less than 130   - normal triglyceride levels  Your PSA level is also stable indicating no evidence of prostate cancer     Congratulaions on your excellent results       Sincerely,  Mathew Ernst MD

## 2020-08-04 ENCOUNTER — TELEPHONE (OUTPATIENT)
Dept: FAMILY MEDICINE | Facility: CLINIC | Age: 65
End: 2020-08-04

## 2020-08-04 NOTE — TELEPHONE ENCOUNTER
Patient is establishing care with Dr. Jordan on 9/1. He would like to be able to get labs before he sees him.     Could he have all the labs added that would be needed for a yearly physical, especially the PSA, so they could be ready when he sees Dr. Jordan?

## 2020-08-05 DIAGNOSIS — Z12.5 PROSTATE CANCER SCREENING: ICD-10-CM

## 2020-08-05 DIAGNOSIS — E78.5 HYPERLIPIDEMIA, UNSPECIFIED HYPERLIPIDEMIA TYPE: Primary | ICD-10-CM

## 2020-08-05 DIAGNOSIS — I10 ESSENTIAL HYPERTENSION: ICD-10-CM

## 2020-08-05 NOTE — PROGRESS NOTES
Patient has upcoming appointment is requesting labs to be done before his appointment.  He does have a history of hypertension and hyperlipidemia.  He would also like to be screened for prostate cancer with PSA.

## 2020-08-21 ENCOUNTER — APPOINTMENT (OUTPATIENT)
Dept: FAMILY MEDICINE | Facility: CLINIC | Age: 65
End: 2020-08-21
Payer: COMMERCIAL

## 2020-08-21 DIAGNOSIS — Z12.5 PROSTATE CANCER SCREENING: ICD-10-CM

## 2020-08-21 DIAGNOSIS — I10 ESSENTIAL HYPERTENSION: ICD-10-CM

## 2020-08-21 DIAGNOSIS — E78.5 HYPERLIPIDEMIA, UNSPECIFIED HYPERLIPIDEMIA TYPE: ICD-10-CM

## 2020-08-21 LAB
ALBUMIN SERPL-MCNC: 4.4 G/DL (ref 3.5–5.3)
ALP SERPL-CCNC: 61 U/L (ref 45–115)
ALT SERPL-CCNC: 32 U/L (ref 7–52)
ANION GAP SERPL CALC-SCNC: 8 MMOL/L (ref 3–11)
AST SERPL-CCNC: 27 U/L
BILIRUB SERPL-MCNC: 0.82 MG/DL (ref 0.2–1.4)
BUN SERPL-MCNC: 15 MG/DL (ref 7–25)
CALCIUM ALBUM COR SERPL-MCNC: 9.4 MG/DL (ref 8.6–10.3)
CALCIUM SERPL-MCNC: 9.7 MG/DL (ref 8.6–10.3)
CHLORIDE SERPL-SCNC: 103 MMOL/L (ref 98–107)
CHOLEST SERPL-MCNC: 186 MG/DL (ref 0–199)
CO2 SERPL-SCNC: 26 MMOL/L (ref 21–32)
CREAT SERPL-MCNC: 0.85 MG/DL (ref 0.7–1.3)
FASTING STATUS PATIENT QL REPORTED: YES
GFR SERPL CREATININE-BSD FRML MDRD: 92 ML/MIN/1.73M*2
GLUCOSE SERPL-MCNC: 95 MG/DL (ref 70–105)
HDLC SERPL-MCNC: 64 MG/DL
LDLC SERPL CALC-MCNC: 96 MG/DL (ref 20–99)
POTASSIUM SERPL-SCNC: 4.1 MMOL/L (ref 3.5–5.1)
PROT SERPL-MCNC: 7.3 G/DL (ref 6–8.3)
PSA SERPL-MCNC: 1.53 NG/ML (ref 0–4)
SODIUM SERPL-SCNC: 137 MMOL/L (ref 135–145)
TRIGL SERPL-MCNC: 128 MG/DL

## 2020-08-21 PROCEDURE — 36415 COLL VENOUS BLD VENIPUNCTURE: CPT | Performed by: FAMILY MEDICINE

## 2020-08-21 PROCEDURE — 80061 LIPID PANEL: CPT | Performed by: FAMILY MEDICINE

## 2020-08-21 PROCEDURE — 84153 ASSAY OF PSA TOTAL: CPT | Performed by: FAMILY MEDICINE

## 2020-08-21 PROCEDURE — 80053 COMPREHEN METABOLIC PANEL: CPT | Performed by: FAMILY MEDICINE

## 2020-08-28 ENCOUNTER — TELEPHONE (OUTPATIENT)
Dept: FAMILY MEDICINE | Facility: CLINIC | Age: 65
End: 2020-08-28

## 2020-09-01 ENCOUNTER — OFFICE VISIT (OUTPATIENT)
Dept: FAMILY MEDICINE | Facility: CLINIC | Age: 65
End: 2020-09-01
Payer: COMMERCIAL

## 2020-09-01 VITALS
HEIGHT: 76 IN | RESPIRATION RATE: 18 BRPM | WEIGHT: 221 LBS | DIASTOLIC BLOOD PRESSURE: 80 MMHG | TEMPERATURE: 98.1 F | SYSTOLIC BLOOD PRESSURE: 144 MMHG | BODY MASS INDEX: 26.91 KG/M2 | HEART RATE: 69 BPM | OXYGEN SATURATION: 99 %

## 2020-09-01 DIAGNOSIS — Z80.0 FAMILY HISTORY OF COLON CANCER: ICD-10-CM

## 2020-09-01 DIAGNOSIS — E78.5 HYPERLIPIDEMIA, UNSPECIFIED HYPERLIPIDEMIA TYPE: Primary | ICD-10-CM

## 2020-09-01 DIAGNOSIS — I10 ESSENTIAL HYPERTENSION: ICD-10-CM

## 2020-09-01 PROCEDURE — 99202 OFFICE O/P NEW SF 15 MIN: CPT | Performed by: FAMILY MEDICINE

## 2020-09-01 RX ORDER — ATORVASTATIN CALCIUM 20 MG/1
20 TABLET, FILM COATED ORAL DAILY
COMMUNITY
Start: 2020-06-26 | End: 2020-09-01 | Stop reason: SDUPTHER

## 2020-09-01 RX ORDER — TADALAFIL 5 MG/1
5 TABLET ORAL
COMMUNITY
Start: 2019-09-25 | End: 2022-09-22

## 2020-09-01 RX ORDER — ATORVASTATIN CALCIUM 20 MG/1
20 TABLET, FILM COATED ORAL DAILY
Qty: 90 TABLET | Refills: 0 | Status: SHIPPED | OUTPATIENT
Start: 2020-09-01 | End: 2020-09-01 | Stop reason: SDUPTHER

## 2020-09-01 RX ORDER — METOPROLOL SUCCINATE 25 MG/1
25 TABLET, EXTENDED RELEASE ORAL DAILY
COMMUNITY
Start: 2019-09-25 | End: 2020-09-01 | Stop reason: SDUPTHER

## 2020-09-01 RX ORDER — METOPROLOL SUCCINATE 25 MG/1
25 TABLET, EXTENDED RELEASE ORAL DAILY
Qty: 90 TABLET | Refills: 3 | Status: SHIPPED | OUTPATIENT
Start: 2020-09-01 | End: 2021-09-15 | Stop reason: SDUPTHER

## 2020-09-01 RX ORDER — ATORVASTATIN CALCIUM 20 MG/1
20 TABLET, FILM COATED ORAL DAILY
Qty: 90 TABLET | Refills: 3 | Status: SHIPPED | OUTPATIENT
Start: 2020-09-01 | End: 2021-09-15 | Stop reason: SDUPTHER

## 2020-09-01 RX ORDER — METOPROLOL SUCCINATE 25 MG/1
25 TABLET, EXTENDED RELEASE ORAL DAILY
Qty: 90 TABLET | Refills: 0 | Status: SHIPPED | OUTPATIENT
Start: 2020-09-01 | End: 2020-09-01 | Stop reason: SDUPTHER

## 2020-09-01 SDOH — HEALTH STABILITY: MENTAL HEALTH: HOW OFTEN DO YOU HAVE A DRINK CONTAINING ALCOHOL?: 4 OR MORE TIMES A WEEK

## 2020-09-01 SDOH — HEALTH STABILITY: MENTAL HEALTH: HOW MANY DRINKS CONTAINING ALCOHOL DO YOU HAVE ON A TYPICAL DAY WHEN YOU ARE DRINKING?: NOT ASKED

## 2020-09-01 SDOH — HEALTH STABILITY: MENTAL HEALTH: HOW OFTEN DO YOU HAVE SIX OR MORE DRINKS ON ONE OCCASION?: NOT ASKED

## 2020-09-01 ASSESSMENT — ENCOUNTER SYMPTOMS
HEMATURIA: 0
DYSURIA: 0
ARTHRALGIAS: 0
APPETITE CHANGE: 0
HEADACHES: 0
DIARRHEA: 0
CHILLS: 0
EYE PAIN: 0
CONSTIPATION: 0
SEIZURES: 0
DIZZINESS: 0
VOMITING: 0
COLOR CHANGE: 0
COUGH: 0
BACK PAIN: 0
SHORTNESS OF BREATH: 0
ABDOMINAL PAIN: 0
SORE THROAT: 0
LIGHT-HEADEDNESS: 0
FEVER: 0
SLEEP DISTURBANCE: 0
PALPITATIONS: 0

## 2020-09-01 ASSESSMENT — PAIN SCALES - GENERAL: PAINLEVEL: 0-NO PAIN

## 2020-09-01 NOTE — PATIENT INSTRUCTIONS
Patient Education     Preventive Care 40-64 Years Old, Male  Preventive care refers to lifestyle choices and visits with your health care provider that can promote health and wellness. This includes:  · A yearly physical exam. This is also called an annual well check.  · Regular dental and eye exams.  · Immunizations.  · Screening for certain conditions.  · Healthy lifestyle choices, such as eating a healthy diet, getting regular exercise, not using drugs or products that contain nicotine and tobacco, and limiting alcohol use.  What can I expect for my preventive care visit?  Physical exam  Your health care provider will check:  · Height and weight. These may be used to calculate body mass index (BMI), which is a measurement that tells if you are at a healthy weight.  · Heart rate and blood pressure.  · Your skin for abnormal spots.  Counseling  Your health care provider may ask you questions about:  · Alcohol, tobacco, and drug use.  · Emotional well-being.  · Home and relationship well-being.  · Sexual activity.  · Eating habits.  · Work and work environment.  What immunizations do I need?    Influenza (flu) vaccine  · This is recommended every year.  Tetanus, diphtheria, and pertussis (Tdap) vaccine  · You may need a Td booster every 10 years.  Varicella (chickenpox) vaccine  · You may need this vaccine if you have not already been vaccinated.  Zoster (shingles) vaccine  · You may need this after age 60.  Measles, mumps, and rubella (MMR) vaccine  · You may need at least one dose of MMR if you were born in 1957 or later. You may also need a second dose.  Pneumococcal conjugate (PCV13) vaccine  · You may need this if you have certain conditions and were not previously vaccinated.  Pneumococcal polysaccharide (PPSV23) vaccine  · You may need one or two doses if you smoke cigarettes or if you have certain conditions.  Meningococcal conjugate (MenACWY) vaccine  · You may need this if you have certain  conditions.  Hepatitis A vaccine  · You may need this if you have certain conditions or if you travel or work in places where you may be exposed to hepatitis A.  Hepatitis B vaccine  · You may need this if you have certain conditions or if you travel or work in places where you may be exposed to hepatitis B.  Haemophilus influenzae type b (Hib) vaccine  · You may need this if you have certain risk factors.  Human papillomavirus (HPV) vaccine  · If recommended by your health care provider, you may need three doses over 6 months.  You may receive vaccines as individual doses or as more than one vaccine together in one shot (combination vaccines). Talk with your health care provider about the risks and benefits of combination vaccines.  What tests do I need?  Blood tests  · Lipid and cholesterol levels. These may be checked every 5 years, or more frequently if you are over 50 years old.  · Hepatitis C test.  · Hepatitis B test.  Screening  · Lung cancer screening. You may have this screening every year starting at age 55 if you have a 30-pack-year history of smoking and currently smoke or have quit within the past 15 years.  · Prostate cancer screening. Recommendations will vary depending on your family history and other risks.  · Colorectal cancer screening. All adults should have this screening starting at age 50 and continuing until age 75. Your health care provider may recommend screening at age 45 if you are at increased risk. You will have tests every 1-10 years, depending on your results and the type of screening test.  · Diabetes screening. This is done by checking your blood sugar (glucose) after you have not eaten for a while (fasting). You may have this done every 1-3 years.  · Sexually transmitted disease (STD) testing.  Follow these instructions at home:  Eating and drinking  · Eat a diet that includes fresh fruits and vegetables, whole grains, lean protein, and low-fat dairy products.  · Take vitamin and  mineral supplements as recommended by your health care provider.  · Do not drink alcohol if your health care provider tells you not to drink.  · If you drink alcohol:  ? Limit how much you have to 0-2 drinks a day.  ? Be aware of how much alcohol is in your drink. In the [place] one drink equals one 12 oz bottle of beer (355 mL), one 5 oz glass of wine (148 mL), or one 1½ oz glass of hard liquor (44 mL).  Lifestyle  · Take daily care of your teeth and gums.  · Stay active. Exercise for at least 30 minutes on 5 or more days each week.  · Do not use any products that contain nicotine or tobacco, such as cigarettes, e-cigarettes, and chewing tobacco. If you need help quitting, ask your health care provider.  · If you are sexually active, practice safe sex. Use a condom or other form of protection to prevent STIs (sexually transmitted infections).  · Talk with your health care provider about taking a low-dose aspirin every day starting at age 50.  What's next?  · Go to your health care provider once a year for a well check visit.  · Ask your health care provider how often you should have your eyes and teeth checked.  · Stay up to date on all vaccines.  This information is not intended to replace advice given to you by your health care provider. Make sure you discuss any questions you have with your health care provider.  Document Released: 01/13/2017 Document Revised: 12/12/2019 Document Reviewed: 12/12/2019  [employer] Patient Education © 2020 [employer]

## 2020-09-01 NOTE — PROGRESS NOTES
Blaise Elaine is a 65 y.o. year old male who presents for   Chief Complaint   Patient presents with   • Establish Care   .      Patient presents to establish care for health maintenance and medication management.  Denies any significant complaints today.  Patient mentioned that he does have a family history of colon cancer.  Specifically, his brother has colon cancer that was discovered at age 45.  He has had a partial colectomy because of it.  Patient had a colonoscopy in 2017.  Also had one in  and a polyp was removed.  The follow-up colonoscopy in  did not demonstrate any new findings.  Therefore, based on this record it was recommended to repeat it in 2022.  He is currently on metoprolol and atorvastatin regularly.  He takes tadalafil occasionally and a few supplements.  Denies any other concerns.               Allergies   Allergen Reactions   • Morphine Nausea And Vomiting         Current Outpatient Medications on File Prior to Visit   Medication Sig   • glucosamine HCl/chondroitin schmidt (GLUCOSAMINE-CHONDROITIN ORAL) Take by mouth daily   • tadalafiL (CIALIS) 5 mg tablet Take 5 mg by mouth   • multivitamin with minerals tablet Take 1 tablet by mouth daily     No current facility-administered medications on file prior to visit.         Past Medical History:   Diagnosis Date   • Hypertension      Social History     Tobacco Use   • Smoking status: Former Smoker     Packs/day: 1.00     Years: 5.00     Pack years: 5.00     Types: Cigarettes     Quit date: 1981     Years since quittin.1   • Smokeless tobacco: Never Used   Substance and Sexual Activity   • Alcohol use: Yes     Alcohol/week: 14.0 standard drinks     Types: 14 Shots of liquor per week     Frequency: 4 or more times a week     Comment: 2 to 3 drinks before dinner   • Drug use: Not Currently   • Sexual activity: Not on file   Social History Narrative   • Not on file       Review of Systems   Constitutional:  Negative for appetite change, chills and fever.   HENT: Negative for ear pain and sore throat.    Eyes: Negative for pain and visual disturbance.   Respiratory: Negative for cough and shortness of breath.    Cardiovascular: Negative for chest pain and palpitations.   Gastrointestinal: Negative for abdominal pain, constipation, diarrhea and vomiting.   Genitourinary: Negative for dysuria and hematuria.   Musculoskeletal: Negative for arthralgias and back pain.   Skin: Negative for color change and rash.   Neurological: Negative for dizziness, seizures, syncope, light-headedness and headaches.   Psychiatric/Behavioral: Negative for sleep disturbance.   All other systems reviewed and are negative.      Vitals:    09/01/20 0938   BP: 144/80   Pulse: 69   Resp: 18   Temp: 36.7 °C (98.1 °F)   SpO2: 99%       Wt Readings from Last 3 Encounters:   09/01/20 100.2 kg (221 lb)     Temp Readings from Last 3 Encounters:   09/01/20 36.7 °C (98.1 °F) (Oral)     BP Readings from Last 3 Encounters:   09/01/20 144/80     Pulse Readings from Last 3 Encounters:   09/01/20 69       Physical Exam  Vitals signs reviewed.   Constitutional:       Appearance: Normal appearance.   HENT:      Head: Normocephalic and atraumatic.   Cardiovascular:      Rate and Rhythm: Normal rate and regular rhythm.      Pulses: Normal pulses.      Heart sounds: Normal heart sounds. No murmur.   Pulmonary:      Effort: Pulmonary effort is normal. No respiratory distress.      Breath sounds: Normal breath sounds. No wheezing.   Neurological:      General: No focal deficit present.      Mental Status: He is alert.   Psychiatric:         Mood and Affect: Mood normal.         Behavior: Behavior normal.           No results found for this or any previous visit (from the past 24 hour(s)).        Problem List        Circulatory    Essential hypertension    Current Assessment & Plan     Refill metoprolol provided today in clinic.  Blood pressure appropriate.  Recommend  that he follow-up in 1 year or sooner if needed.  Basic labs were recently completed.         Relevant Medications    metoprolol succinate XL (TOPROL-XL) 25 mg 24 hr tablet    atorvastatin (LIPITOR) 20 mg tablet       Endocrine/Metabolic    Hyperlipidemia - Primary    Current Assessment & Plan     Currently on atorvastatin 20 mg.  This medication was refilled for a year.  Recent lipid panel reviewed with patient today in clinic.         Relevant Medications    metoprolol succinate XL (TOPROL-XL) 25 mg 24 hr tablet    atorvastatin (LIPITOR) 20 mg tablet       Other    Family history of colon cancer    Current Assessment & Plan     Patient will be due for his next colonoscopy in October 2022.                     There are no Patient Instructions on file for this visit.

## 2020-09-01 NOTE — ASSESSMENT & PLAN NOTE
Currently on atorvastatin 20 mg.  This medication was refilled for a year.  Recent lipid panel reviewed with patient today in clinic.

## 2020-09-01 NOTE — ASSESSMENT & PLAN NOTE
Refill metoprolol provided today in clinic.  Blood pressure appropriate.  Recommend that he follow-up in 1 year or sooner if needed.  Basic labs were recently completed.

## 2020-10-05 ENCOUNTER — IMMUNIZATION (OUTPATIENT)
Dept: FAMILY MEDICINE | Facility: CLINIC | Age: 65
End: 2020-10-05
Payer: COMMERCIAL

## 2020-10-05 PROCEDURE — 90686 IIV4 VACC NO PRSV 0.5 ML IM: CPT | Mod: FLU | Performed by: FAMILY MEDICINE

## 2020-10-05 PROCEDURE — 90471 IMMUNIZATION ADMIN: CPT | Mod: FLU | Performed by: FAMILY MEDICINE

## 2020-12-20 ENCOUNTER — HEALTH MAINTENANCE LETTER (OUTPATIENT)
Age: 65
End: 2020-12-20

## 2021-04-01 ENCOUNTER — CLINICAL SUPPORT (OUTPATIENT)
Dept: FAMILY MEDICINE | Facility: CLINIC | Age: 66
End: 2021-04-01

## 2021-04-01 DIAGNOSIS — Z23 ENCOUNTER FOR VACCINATION: Primary | ICD-10-CM

## 2021-04-29 ENCOUNTER — CLINICAL SUPPORT (OUTPATIENT)
Dept: FAMILY MEDICINE | Facility: CLINIC | Age: 66
End: 2021-04-29

## 2021-04-29 DIAGNOSIS — Z23 ENCOUNTER FOR VACCINATION: Primary | ICD-10-CM

## 2021-08-02 NOTE — PROGRESS NOTES
Subjective -  New Dermatology Skin examination  Blaise Elaine is a 66 y.o. male who presents for a full body skin exam . Lesions of concern include: left shoulder he has new spot that is patchy and has a new itchy spot on his chest that will bleed when scratched.  Patient does not endorse a history of tanning bed use.  Patient skin history: Negative for personal history of non melanoma skin cancer, Negative for actinic keratosis, Negative personal history of melanoma,  Negative family history of melanoma.  Positive family history of non melanoma skin cancer.          Review of Systems  Review of Systems   Constitutional: Negative for fatigue and fever.   Skin: Negative for rash.   Allergic/Immunologic: Negative for environmental allergies and immunocompromised state.   Hematological: Does not bruise/bleed easily.   All other systems reviewed and are negative.        Past Medical History:   Diagnosis Date   • Hypertension 2016          Past Surgical History:   Procedure Laterality Date   • HERNIA REPAIR  2018         family history includes Colon cancer (age of onset: 45) in his brother; Prostate cancer in his father; Skin cancer in his father and mother; Valvular heart disease in his father.      Medications  Current Outpatient Medications on File Prior to Visit   Medication Sig Dispense Refill   • glucosamine HCl/chondroitin schmidt (GLUCOSAMINE-CHONDROITIN ORAL) Take by mouth daily     • multivitamin with minerals tablet Take 1 tablet by mouth daily     • metoprolol succinate XL (TOPROL-XL) 25 mg 24 hr tablet Take 1 tablet (25 mg total) by mouth daily 90 tablet 3   • atorvastatin (LIPITOR) 20 mg tablet Take 1 tablet (20 mg total) by mouth daily 90 tablet 3   • tadalafiL (CIALIS) 5 mg tablet Take 5 mg by mouth       No current facility-administered medications on file prior to visit.       Allergies  Morphine      Physical Examination    Vital Signs /78   Pulse 78   Temp 36.6 °C (97.8 °F) (Temporal)    SpO2 97%       Physical Exam Findings:   Constitutional: General Appearance: healthy-appearing, well-nourished, and well-developed. Level of Distress: NAD. Ambulation: ambulating normally.  Psychiatric: Insight: good judgement. Mental Status: normal mood and affect and active and alert. Orientation: to time, place, and person. Memory: recent memory normal and remote memory normal.  Head: normocephalic and atraumatic.  Eyes: Lids and Conjunctivae: no discharge or pallor and non-injected. Lens: clear. Sclerae: non-icteric.  Neurologic: Gait and Station: normal gait and station. Cranial Nerves: grossly intact. Coordination and Cerebellum: no tremor.      A full body skin examination was performed including scalp, face, lips, mouth, ears, neck, both arms, chest, back, abdomen, buttocks, groin, genitals, both legs, both feet  Gan skin type:I  Trunk (including back, chest, abdomen): Right paraspinal thoracic back subcutaneous mobile nodule. Stuck on tan brown papules, well demarcated tan brown macules, bright red cherry papules. Well demarcated skin colored papules with similar pigmentation and borders. Stuck on tan brown papule on an erythematous base x1 on the chest.  Arms/Hands: Stuck on tan brown papules, well demarcated tan brown macules, bright red cherry papules. Actinic changes. Stuck on tan brown papule on an erythematous base x1 on the left shoulder.  Buttocks: No suspicious lesions noted.  Groin:No suspicious lesions noted.  Genitals:Not examined.  Legs: Well demarcated skin colored papules with similar pigmentation and borders. Stuck on tan brown papules, well demarcated tan brown macules, bright red cherry papules.  Feet: No suspicious lesions noted.  Scalp: Well demarcated tan brown macules.   Face: Well demarcated tan brown macules. Stuck on papules. Actinic changes.  Mouth: Ulceration papule on right lower lip. (defers treatment)                                    Procedure Note: Destruction of  Benign or Premalignant Lesion    Date/Time: 8/16/2021 10:44 AM  Performed by: Lanie Navarro DO      Consent  Verbal consent was obtained from the patient. Written consent was not obtained from the patient. Consent given by patient. The patient states understanding of the procedure being performed. Patient ID confirmed verbally with patient.     Location:  2 total lesions removed   Upper Extremity location(s): left shoulder  Number of Upper Extremity lesions removed: 1    Trunk location(s): chest  Number of Trunk lesions removed:1        Procedure Details   Lesion(s) are benign. Local anesthesia was not used. Lesion(s) destroyed with: liquid nitrogen. Number of freeze/thaw cycles was 1. Lesion(s) were frozen until an ice ball extended beyond the lesion.     Post-Procedure  Patient tolerated procedure well with no complications.               Blaise was seen today for full body skin check.    Diagnoses and all orders for this visit:    Actinic skin damage  - Explained to patient that actinic damage is a chronic life long condition.  Discussed the nature of actinic damage with patient.  Informed patient to prevent further damage and to lower the risk of skin cancer a broad spectrum sunscreen should be used SPF 30-50 reapplying every two hours while outdoors.        Inflamed seborrheic keratosis  -     Destruction of Benign or Premalignant Lesion    Cherry hemangioma  Discussed benign nature of cherry angiomas in detail inform patient these are genetic and hormonally induced vascular growths with no potential for malignant transformation      Lentigo   Discussed benign nature of lentigos, informed patient they are sun-induced brown flat lesions      Seborrheic keratoses  Discussed benign nature of seborrheic keratoses in detail I informed patient these are genetic finding and not related to chronic sun exposure.  Although they can appear abnormal in appearance they are noncancerous.  Encouraged patient to call should  they note any new or changing lesions.      Lipoma of back  Reassured patient that these are benign and no treatment is necessary unless they become symptomatic.    Herpes simplex, acute on chronic  Discussed that herpes simplex virus causes cold sores when people are stressed, hormonal changes occur, or excessive sun or wind/cold exposure occurs.  The virus lives in the nerve and will recur, but when you feel symptoms of tingling/burning coming you should take an antiviral medication immediately to try to hasten recovery (it only works within the first 24-48 hours so keep this on hand at all times).  If you have activity, try not to touch it or touch others in that area as it can spread skin to skin and is highly contagious.      Multiple benign nevi of torso  Reassurance given for benign appearing nevi today, and to watch for any changes that would suggest atypia such as itching, bleeding, changing irregular shape, increased diameter or irregularity, and multiple colors in moles      Multiple benign nevi of left lower extremity  Reassurance given for benign appearing nevi today, and to watch for any changes that would suggest atypia such as itching, bleeding, changing irregular shape, increased diameter or irregularity, and multiple colors in moles      Multiple benign nevi of right lower extremity  Reassurance given for benign appearing nevi today, and to watch for any changes that would suggest atypia such as itching, bleeding, changing irregular shape, increased diameter or irregularity, and multiple colors in moles             I emphasized daily sunscreen use with an SPF of 30-50, broad spectrum and water resistant.  I directed reapplication every two hours.  I recommended wide brimmed hats.  Finally, we discussed danger signs of changing skin lesions including sores not healing and moles changing in size, shape or color.  Should any of these lesions occur before next appointment, I instructed patent to call  sooner for evaluation.        Return in about 1 year (around 8/16/2022) for Skin check.

## 2021-08-02 NOTE — PATIENT INSTRUCTIONS
"Avoid sunlight between the hours of 10 am and 2 pm, wear a broad spectrum, water resistant sunscreen with SPF of 30-50 year round. Reapply sunscreen every 2 hours and after exercising or swimming. Wearing a 6\" broad brimmed hat, a lip sunblock of SPF of 30-50, and sun protective clothing is also suggested year round.   Recommended sunscreens include Neutrogena Ultra Sheer Dry Touch SPF 50 or higher, Neutrogena Sheer Zinc Dry Touch SPF 50, and Vanicream Sport.  ---------------------------------------------------------------------  Monthly self-examination of your skin is important. Should any lesions, including moles, change in size, shape, color, itch, bleed or burn, contact our office for sooner evaluation.  ----------------------------------------------------------------------  **[employer] is our survey company. You will be receiving a survey about your care. Your satisfaction is important to us and we are always looking for opportunities to improve your experience. If you come across questions in the survey that are not applicable to your visit, please leave these questions blank. Your identity is kept confidential so please be honest! Thank you!**    You were treated with liquid nitrogen today. You should expect these areas to burn and once the burning subsides, the area(s)may itch. You should expect some reaction anywhere from welting of the skin to larger liquid filled blisters depending on how aggressively your lesion(s) needed to be treated. If you had treatment on your forehead, eyebrow areas or cheekbones, you could have some swelling under your eyes. This is normal and nothing to worry about. Blisters should be left intact until they pop and drain on their own. You will most likely have a clear drainage much like a blister when it pops.     The areas treated should be cared for by gentle daily cleansing with Dove soap and water and your hands. You should apply Aquaphor ointment or Vaseline  WITH A Q-TIP " to the areas daily as they are healing and continue this until they are completely healed. It may take anywhere from 7-14 days for areas to completely heal. If any other area than your face was treated, it may take longer for those areas to heal.    If warts were treated, they may turn to blood blisters and may appear purple, red or black. Leave the blister intact and let it pop on its own. Keep these areas dry and clean and do not use any ointment or Vaseline to warts.    IF YOU NOTICE INCREASED SURROUNDING REDNESS, TENDERNESS OR A PUS-LIKE DRAINAGE, PLEASE CONTACT OUR OFFICE IMMEDIATELY.

## 2021-08-08 ENCOUNTER — HEALTH MAINTENANCE LETTER (OUTPATIENT)
Age: 66
End: 2021-08-08

## 2021-08-16 ENCOUNTER — OFFICE VISIT (OUTPATIENT)
Dept: DERMATOLOGY | Facility: CLINIC | Age: 66
End: 2021-08-16
Payer: MEDICARE

## 2021-08-16 VITALS
TEMPERATURE: 97.8 F | DIASTOLIC BLOOD PRESSURE: 78 MMHG | SYSTOLIC BLOOD PRESSURE: 149 MMHG | HEART RATE: 78 BPM | OXYGEN SATURATION: 97 %

## 2021-08-16 DIAGNOSIS — L81.4 LENTIGO: ICD-10-CM

## 2021-08-16 DIAGNOSIS — L57.8 ACTINIC SKIN DAMAGE: Primary | ICD-10-CM

## 2021-08-16 DIAGNOSIS — L82.1 SEBORRHEIC KERATOSES: ICD-10-CM

## 2021-08-16 DIAGNOSIS — D17.1 LIPOMA OF BACK: ICD-10-CM

## 2021-08-16 DIAGNOSIS — D22.72 MULTIPLE BENIGN NEVI OF LEFT LOWER EXTREMITY: ICD-10-CM

## 2021-08-16 DIAGNOSIS — B00.9 HERPES SIMPLEX: ICD-10-CM

## 2021-08-16 DIAGNOSIS — D22.71 MULTIPLE BENIGN NEVI OF RIGHT LOWER EXTREMITY: ICD-10-CM

## 2021-08-16 DIAGNOSIS — D22.5 MULTIPLE BENIGN NEVI OF TORSO: ICD-10-CM

## 2021-08-16 DIAGNOSIS — L82.0 INFLAMED SEBORRHEIC KERATOSIS: ICD-10-CM

## 2021-08-16 DIAGNOSIS — D18.01 CHERRY HEMANGIOMA: ICD-10-CM

## 2021-08-16 PROCEDURE — 17110 DESTRUCTION B9 LES UP TO 14: CPT | Mod: PO | Performed by: DERMATOLOGY

## 2021-08-16 PROCEDURE — G0463 HOSPITAL OUTPT CLINIC VISIT: HCPCS | Mod: PO | Performed by: DERMATOLOGY

## 2021-08-16 PROCEDURE — 99203 OFFICE O/P NEW LOW 30 MIN: CPT | Mod: 25 | Performed by: DERMATOLOGY

## 2021-08-16 ASSESSMENT — PAIN SCALES - GENERAL: PAINLEVEL: 0-NO PAIN

## 2021-08-16 ASSESSMENT — ENCOUNTER SYMPTOMS
FEVER: 0
BRUISES/BLEEDS EASILY: 0
FATIGUE: 0

## 2021-09-13 ENCOUNTER — LAB (OUTPATIENT)
Dept: FAMILY MEDICINE | Facility: CLINIC | Age: 66
End: 2021-09-13
Payer: MEDICARE

## 2021-09-13 DIAGNOSIS — Z12.5 ENCOUNTER FOR SCREENING FOR MALIGNANT NEOPLASM OF PROSTATE: ICD-10-CM

## 2021-09-13 DIAGNOSIS — Z00.00 ANNUAL PHYSICAL EXAM: Primary | ICD-10-CM

## 2021-09-13 DIAGNOSIS — Z00.00 ANNUAL PHYSICAL EXAM: ICD-10-CM

## 2021-09-13 LAB
ALBUMIN SERPL-MCNC: 4.4 G/DL (ref 3.5–5.3)
ALP SERPL-CCNC: 66 U/L (ref 45–115)
ALT SERPL-CCNC: 37 U/L (ref 7–52)
ANION GAP SERPL CALC-SCNC: 8 MMOL/L (ref 3–11)
AST SERPL-CCNC: 31 U/L
BILIRUB SERPL-MCNC: 0.73 MG/DL (ref 0.2–1.4)
BUN SERPL-MCNC: 17 MG/DL (ref 7–25)
CALCIUM ALBUM COR SERPL-MCNC: 9 MG/DL (ref 8.6–10.3)
CALCIUM SERPL-MCNC: 9.3 MG/DL (ref 8.6–10.3)
CHLORIDE SERPL-SCNC: 105 MMOL/L (ref 98–107)
CHOLEST SERPL-MCNC: 183 MG/DL (ref 0–199)
CO2 SERPL-SCNC: 25 MMOL/L (ref 21–32)
CREAT SERPL-MCNC: 0.75 MG/DL (ref 0.7–1.3)
FASTING STATUS PATIENT QL REPORTED: YES
GFR SERPL CREATININE-BSD FRML MDRD: 96 ML/MIN/1.73M*2
GLUCOSE SERPL-MCNC: 91 MG/DL (ref 70–105)
HDLC SERPL-MCNC: 59 MG/DL
LDLC SERPL CALC-MCNC: 104 MG/DL (ref 20–99)
POTASSIUM SERPL-SCNC: 4.1 MMOL/L (ref 3.5–5.1)
PROT SERPL-MCNC: 6.8 G/DL (ref 6–8.3)
PSA SERPL-MCNC: 1.42 NG/ML (ref 0–4)
SODIUM SERPL-SCNC: 138 MMOL/L (ref 135–145)
TRIGL SERPL-MCNC: 99 MG/DL

## 2021-09-13 PROCEDURE — 36415 COLL VENOUS BLD VENIPUNCTURE: CPT

## 2021-09-13 PROCEDURE — 80061 LIPID PANEL: CPT

## 2021-09-13 PROCEDURE — 84153 ASSAY OF PSA TOTAL: CPT

## 2021-09-13 PROCEDURE — 80053 COMPREHEN METABOLIC PANEL: CPT

## 2021-09-15 ENCOUNTER — OFFICE VISIT (OUTPATIENT)
Dept: FAMILY MEDICINE | Facility: CLINIC | Age: 66
End: 2021-09-15
Payer: MEDICARE

## 2021-09-15 ENCOUNTER — TELEPHONE (OUTPATIENT)
Dept: FAMILY MEDICINE | Facility: CLINIC | Age: 66
End: 2021-09-15

## 2021-09-15 VITALS
WEIGHT: 234 LBS | BODY MASS INDEX: 28.49 KG/M2 | TEMPERATURE: 98.1 F | DIASTOLIC BLOOD PRESSURE: 72 MMHG | HEART RATE: 77 BPM | OXYGEN SATURATION: 99 % | SYSTOLIC BLOOD PRESSURE: 122 MMHG | HEIGHT: 76 IN

## 2021-09-15 DIAGNOSIS — I10 ESSENTIAL HYPERTENSION: ICD-10-CM

## 2021-09-15 DIAGNOSIS — E78.5 HYPERLIPIDEMIA, UNSPECIFIED HYPERLIPIDEMIA TYPE: ICD-10-CM

## 2021-09-15 DIAGNOSIS — E78.5 HYPERLIPIDEMIA LDL GOAL <100: ICD-10-CM

## 2021-09-15 DIAGNOSIS — Z11.59 NEED FOR HEPATITIS C SCREENING TEST: ICD-10-CM

## 2021-09-15 DIAGNOSIS — Z13.0 SCREENING FOR DEFICIENCY ANEMIA: ICD-10-CM

## 2021-09-15 DIAGNOSIS — Z01.818 PRE-OP EXAM: Primary | ICD-10-CM

## 2021-09-15 DIAGNOSIS — I10 ESSENTIAL HYPERTENSION: Primary | ICD-10-CM

## 2021-09-15 LAB
BASOPHILS # BLD AUTO: 0 10*3/UL
BASOPHILS NFR BLD AUTO: 1 % (ref 0–2)
EOSINOPHIL # BLD AUTO: 0.1 10*3/UL
EOSINOPHIL NFR BLD AUTO: 2 % (ref 0–3)
ERYTHROCYTE [DISTWIDTH] IN BLOOD BY AUTOMATED COUNT: 13.7 % (ref 11.5–15)
HCT VFR BLD AUTO: 43.8 % (ref 38–50)
HGB BLD-MCNC: 15.1 G/DL (ref 13.2–17.2)
LYMPHOCYTES # BLD AUTO: 1.4 10*3/UL
LYMPHOCYTES NFR BLD AUTO: 31 % (ref 15–47)
MCH RBC QN AUTO: 33.1 PG (ref 29–34)
MCHC RBC AUTO-ENTMCNC: 34.5 G/DL (ref 32–36)
MCV RBC AUTO: 95.9 FL (ref 82–97)
MONOCYTES # BLD AUTO: 0.6 10*3/UL
MONOCYTES NFR BLD AUTO: 13 % (ref 5–13)
NEUTROPHILS # BLD AUTO: 2.3 10*3/UL
NEUTROPHILS NFR BLD AUTO: 53 % (ref 46–70)
PLATELET # BLD AUTO: 212 10*3/UL (ref 130–350)
PMV BLD AUTO: 6.8 FL (ref 6.9–10.8)
RBC # BLD AUTO: 4.57 10*6/ΜL (ref 4.1–5.8)
WBC # BLD AUTO: 4.4 10*3/UL (ref 3.7–9.6)

## 2021-09-15 PROCEDURE — 93010 ELECTROCARDIOGRAM REPORT: CPT | Performed by: FAMILY MEDICINE

## 2021-09-15 PROCEDURE — 85025 COMPLETE CBC W/AUTO DIFF WBC: CPT | Performed by: FAMILY MEDICINE

## 2021-09-15 PROCEDURE — G0463 HOSPITAL OUTPT CLINIC VISIT: HCPCS

## 2021-09-15 PROCEDURE — 99213 OFFICE O/P EST LOW 20 MIN: CPT | Performed by: FAMILY MEDICINE

## 2021-09-15 PROCEDURE — 86803 HEPATITIS C AB TEST: CPT | Performed by: FAMILY MEDICINE

## 2021-09-15 PROCEDURE — 36415 COLL VENOUS BLD VENIPUNCTURE: CPT | Performed by: FAMILY MEDICINE

## 2021-09-15 PROCEDURE — 93005 ELECTROCARDIOGRAM TRACING: CPT | Performed by: FAMILY MEDICINE

## 2021-09-15 RX ORDER — ATROPINE SULFATE 10 MG/ML
1 SOLUTION/ DROPS OPHTHALMIC 2 TIMES DAILY
COMMUNITY
Start: 2021-08-26 | End: 2022-09-22

## 2021-09-15 RX ORDER — OFLOXACIN 3 MG/ML
1 SOLUTION/ DROPS OPHTHALMIC 4 TIMES DAILY
COMMUNITY
Start: 2021-08-26 | End: 2022-09-22

## 2021-09-15 RX ORDER — METOPROLOL SUCCINATE 25 MG/1
TABLET, EXTENDED RELEASE ORAL
Qty: 90 TABLET | Refills: 3 | OUTPATIENT
Start: 2021-09-15

## 2021-09-15 RX ORDER — ATORVASTATIN CALCIUM 20 MG/1
20 TABLET, FILM COATED ORAL DAILY
Qty: 90 TABLET | Refills: 3 | Status: SHIPPED | OUTPATIENT
Start: 2021-09-15 | End: 2021-09-15 | Stop reason: DRUGHIGH

## 2021-09-15 RX ORDER — ATORVASTATIN CALCIUM 40 MG/1
40 TABLET, FILM COATED ORAL DAILY
Qty: 90 TABLET | Refills: 3 | Status: SHIPPED | OUTPATIENT
Start: 2021-09-15 | End: 2022-09-22 | Stop reason: SDUPTHER

## 2021-09-15 RX ORDER — ATORVASTATIN CALCIUM 20 MG/1
TABLET, FILM COATED ORAL
Qty: 90 TABLET | Refills: 3 | OUTPATIENT
Start: 2021-09-15

## 2021-09-15 RX ORDER — METOPROLOL SUCCINATE 25 MG/1
25 TABLET, EXTENDED RELEASE ORAL DAILY
Qty: 90 TABLET | Refills: 3 | Status: SHIPPED | OUTPATIENT
Start: 2021-09-15 | End: 2022-08-31

## 2021-09-15 RX ORDER — PREDNISOLONE ACETATE 10 MG/ML
1 SUSPENSION/ DROPS OPHTHALMIC 4 TIMES DAILY
COMMUNITY
Start: 2021-08-26 | End: 2022-09-22

## 2021-09-15 ASSESSMENT — ENCOUNTER SYMPTOMS
LIGHT-HEADEDNESS: 0
CHILLS: 0
ARTHRALGIAS: 1
DIZZINESS: 0
NAUSEA: 0
FEVER: 0
SHORTNESS OF BREATH: 0
PALPITATIONS: 0
DYSURIA: 0
HEADACHES: 0
VOMITING: 0
COUGH: 0

## 2021-09-15 NOTE — TELEPHONE ENCOUNTER
Blaise thinks that his prescription for the Atorvastatin and Metoprolol got rejected because they are on the one order;  The second order for Atovastatin went through ok but he needs the Metaprolol reordered; Colin in [place] on [facility]

## 2021-09-15 NOTE — PROGRESS NOTES
Subjective     Blaise Elaine is a 66 y.o. year old male who presents for   Chief Complaint   Patient presents with   • Annual Exam   • Med Refill   .      Patient presents for pre-op physical exam. He is planning on eye surgery for macular hole. He denies any issues other issues other than blindness in right eye. He denies any pain or discomfort. He does have occasional knee pain that is chronic. He is on medication for HTN and hyperlipdemia.                Allergies   Allergen Reactions   • Morphine Nausea And Vomiting         Current Outpatient Medications on File Prior to Visit   Medication Sig   • glucosamine HCl/chondroitin schmidt (GLUCOSAMINE-CHONDROITIN ORAL) Take by mouth daily   • multivitamin with minerals tablet Take 1 tablet by mouth daily   • metoprolol succinate XL (TOPROL-XL) 25 mg 24 hr tablet Take 1 tablet (25 mg total) by mouth daily   • atropine 1 % ophthalmic solution Administer 1 drop into the right eye 2 (two) times a day   • ofloxacin (OCUFLOX) 0.3 % ophthalmic solution Administer 1 drop into the right eye 4 (four) times a day   • prednisoLONE acetate (PRED FORTE) 1 % ophthalmic suspension Administer 1 drop into the right eye 4 (four) times a day   • tadalafiL (CIALIS) 5 mg tablet Take 5 mg by mouth     No current facility-administered medications on file prior to visit.        Past Medical History:   Diagnosis Date   • Hypertension      Social History     Tobacco Use   • Smoking status: Former Smoker     Packs/day: 1.00     Years: 5.00     Pack years: 5.00     Types: Cigarettes     Quit date: 1981     Years since quittin.1   • Smokeless tobacco: Never Used   Substance and Sexual Activity   • Alcohol use: Yes     Alcohol/week: 14.0 standard drinks     Types: 14 Shots of liquor per week     Comment: 2 to 3 drinks before dinner   • Drug use: Not Currently   • Sexual activity: Not on file   Social History Narrative   • Not on file       Review of Systems   Constitutional:  Negative for chills and fever.   HENT: Negative for congestion.    Eyes: Positive for visual disturbance (right eye blidness from macular cyst).   Respiratory: Negative for cough and shortness of breath.    Cardiovascular: Negative for chest pain and palpitations.   Gastrointestinal: Negative for nausea and vomiting.   Genitourinary: Negative for dysuria.   Musculoskeletal: Positive for arthralgias (knee pain).   Neurological: Negative for dizziness, light-headedness and headaches.   All other systems reviewed and are negative.      Objective     Vitals:    09/15/21 0808   BP: 122/72   Pulse: 77   Temp: 36.7 °C (98.1 °F)   SpO2: 99%       Wt Readings from Last 3 Encounters:   09/15/21 106.1 kg (234 lb)   09/01/20 100.2 kg (221 lb)     Temp Readings from Last 3 Encounters:   09/15/21 36.7 °C (98.1 °F) (Oral)   08/16/21 36.6 °C (97.8 °F) (Temporal)   09/01/20 36.7 °C (98.1 °F) (Oral)     BP Readings from Last 3 Encounters:   09/15/21 122/72   08/16/21 149/78   09/01/20 144/80     Pulse Readings from Last 3 Encounters:   09/15/21 77   08/16/21 78   09/01/20 69       Physical Exam  Vitals reviewed.   Constitutional:       Appearance: Normal appearance.   HENT:      Head: Normocephalic and atraumatic.      Left Ear: Tympanic membrane, ear canal and external ear normal.      Ears:      Comments: Right ear visualization of bone structures with no definite TM.      Mouth/Throat:      Mouth: Mucous membranes are moist.      Pharynx: Oropharynx is clear.   Eyes:      Extraocular Movements: Extraocular movements intact.      Conjunctiva/sclera: Conjunctivae normal.   Cardiovascular:      Rate and Rhythm: Normal rate and regular rhythm.      Pulses: Normal pulses.      Heart sounds: Normal heart sounds. No murmur heard.     Pulmonary:      Effort: Pulmonary effort is normal. No respiratory distress.      Breath sounds: Normal breath sounds. No wheezing.   Musculoskeletal:         General: No deformity.      Cervical back: Neck  supple.   Skin:     General: Skin is warm and dry.   Neurological:      General: No focal deficit present.      Mental Status: He is alert.   Psychiatric:         Mood and Affect: Mood normal.         Behavior: Behavior normal.           Recent Results (from the past 24 hour(s))   CBC w/auto differential Blood, Venous    Collection Time: 09/15/21  9:03 AM   Result Value Ref Range    WBC 4.4 3.7 - 9.6 10*3/uL    RBC 4.57 4.10 - 5.80 10*6/µL    Hemoglobin 15.1 13.2 - 17.2 g/dL    Hematocrit 43.8 38.0 - 50.0 %    MCV 95.9 82.0 - 97.0 fL    MCH 33.1 29.0 - 34.0 pg    MCHC 34.5 32.0 - 36.0 g/dL    RDW 13.7 11.5 - 15.0 %    Platelets 212 130 - 350 10*3/uL    MPV 6.8 (L) 6.9 - 10.8 fL    Neutrophils% 53 46 - 70 %    Lymphocytes% 31 15 - 47 %    Monocytes% 13 5 - 13 %    Eosinophils% 2 0 - 3 %    Basophils% 1 0 - 2 %    ANC (auto diff) 2.30 10*3/UL    Lymphocytes Absolute 1.40 10*3/uL    Monocytes Absolute 0.60 10*3/uL    Eosinophils Absolute 0.10 10*3/uL    Basophils Absolute 0.00 10*3/uL         Assessment     Problem List        Cardiovascular and Mediastinum    Hyperlipidemia LDL goal <100    Current Assessment & Plan     Patient's atorvastatin will be increased from 20 mg to 40 mg daily based on his 10-year risk for heart attack and stroke.  Prescription sent to pharmacy.         Relevant Medications    atorvastatin (LIPITOR) 40 mg tablet       Circulatory    Essential hypertension    Current Assessment & Plan     Refill of lisinopril provided.  Blood pressure appears to be under good control today.  Recent labs demonstrated good kidney function.            Other    Pre-op exam - Primary    Current Assessment & Plan     Perez cardiac risk percentage of 3.9 placing him in a class I category.  He is relatively low risk for surgery and is medically optimized and may proceed with his surgery.  The history and physical form from the eye [facility] was also completed and faxed back to his surgeon.  Blood tests were normal  and EKG demonstrated sinus rhythm with left axis deviation but otherwise no concerning findings on EKG.         Relevant Orders    ECG 12 lead -Normal, Today (Completed)    Screening for deficiency anemia    Relevant Orders    CBC w/auto differential Blood, Venous (Completed)    Need for hepatitis C screening test    Relevant Orders    Hepatitis C antibody Blood, Venous

## 2021-09-15 NOTE — ASSESSMENT & PLAN NOTE
Perez cardiac risk percentage of 3.9 placing him in a class I category.  He is relatively low risk for surgery and is medically optimized and may proceed with his surgery.  The history and physical form from the eye [facility] was also completed and faxed back to his surgeon.  Blood tests were normal and EKG demonstrated sinus rhythm with left axis deviation but otherwise no concerning findings on EKG.

## 2021-09-15 NOTE — ASSESSMENT & PLAN NOTE
Patient's atorvastatin will be increased from 20 mg to 40 mg daily based on his 10-year risk for heart attack and stroke.  Prescription sent to pharmacy.

## 2021-09-15 NOTE — ASSESSMENT & PLAN NOTE
Refill of lisinopril provided.  Blood pressure appears to be under good control today.  Recent labs demonstrated good kidney function.

## 2021-09-17 LAB — HCV IGG SERPL QL IA: NEGATIVE

## 2021-10-03 ENCOUNTER — HEALTH MAINTENANCE LETTER (OUTPATIENT)
Age: 66
End: 2021-10-03

## 2021-10-21 ENCOUNTER — IMMUNIZATION (OUTPATIENT)
Dept: FAMILY MEDICINE | Facility: CLINIC | Age: 66
End: 2021-10-21
Payer: MEDICARE

## 2021-10-21 PROCEDURE — 90471 IMMUNIZATION ADMIN: CPT | Performed by: FAMILY MEDICINE

## 2021-11-02 ENCOUNTER — CLINICAL SUPPORT (OUTPATIENT)
Dept: FAMILY MEDICINE | Facility: CLINIC | Age: 66
End: 2021-11-02
Payer: MEDICARE

## 2021-11-02 DIAGNOSIS — Z23 ENCOUNTER FOR VACCINATION: Primary | ICD-10-CM

## 2021-11-02 PROCEDURE — 91306 MODERNA SARS-COV-2 VACCINE: CPT | Performed by: FAMILY MEDICINE

## 2022-07-26 NOTE — PROGRESS NOTES
Subjective   Return Dermatology Skin examination  Blaise Elaine is a 67 y.o. male with a history of Actinic Keratoses who presents for a full body skin exam . Lesions of concern include: none.      Patient reports a macular hole in right eye repaired at the [facility] last September when asked about changes in health history.       Review of Systems   Constitutional: Negative for fatigue and fever.   Skin: Negative for rash.   Allergic/Immunologic: Negative for environmental allergies and immunocompromised state.   Hematological: Does not bruise/bleed easily.   All other systems reviewed and are negative.        Past Medical History:   Diagnosis Date   • Hypertension 2016       Current Outpatient Medications on File Prior to Visit   Medication Sig Dispense Refill   • atorvastatin (LIPITOR) 40 mg tablet Take 1 tablet (40 mg total) by mouth daily 90 tablet 3   • metoprolol succinate XL (TOPROL-XL) 25 mg 24 hr tablet Take 1 tablet (25 mg total) by mouth daily 90 tablet 3   • glucosamine HCl/chondroitin schmidt (GLUCOSAMINE-CHONDROITIN ORAL) Take by mouth daily     • multivitamin with minerals tablet Take 1 tablet by mouth daily     • atropine 1 % ophthalmic solution Administer 1 drop into the right eye 2 (two) times a day     • ofloxacin (OCUFLOX) 0.3 % ophthalmic solution Administer 1 drop into the right eye 4 (four) times a day     • prednisoLONE acetate (PRED FORTE) 1 % ophthalmic suspension Administer 1 drop into the right eye 4 (four) times a day     • tadalafiL (CIALIS) 5 mg tablet Take 5 mg by mouth       No current facility-administered medications on file prior to visit.       Allergies  Morphine    The following have been reviewed and updated as appropriate in this visit            Physical Examination    Vital Signs  vitals were not taken for this visit.         Physical Exam Findings:     Constitutional: General Appearance: healthy-appearing, well-nourished, and well-developed. Level of Distress: NAD.  Ambulation: ambulating normally.  Psychiatric: Insight: good judgement. Mental Status: normal mood and affect and active and alert. Orientation: to time, place, and person. Memory: recent memory normal and remote memory normal.  Head: normocephalic and atraumatic.  Eyes: Lids and Conjunctivae: no discharge or pallor and non-injected. Lens: clear. Sclerae: non-icteric.  Neurologic: Gait and Station: normal gait and station. Cranial Nerves: grossly intact. Coordination and Cerebellum: no tremor.      Lymph nodes exam:  Negative post auricular, clavicular, axillary adenopathy.     A full body skin examination was performed including scalp, face, lips, mouth, ears, neck, both arms, chest, back, abdomen, buttocks, both legs, both feet      Gan skin type:I      Trunk (including back, chest, abdomen): Stuck on tan brown papules, well demarcated tan brown macules, bright red cherry papules.   Arms/Hands: Stuck on tan brown papules, well demarcated tan brown macules, bright red cherry papules. Pink brown macules with regular borders and pigmentation   Buttocks: No suspicious lesions noted   Legs: Stuck on tan brown papules, well demarcated tan brown macules, bright red cherry papules. Pink papules   Feet: No suspicious lesions noted   Scalp: Well demarcated tan brown macules   Face: Well demarcated tan brown macules  Mouth: No suspicious lesions noted     Left occipital scalp, subcutaneous, mobile, non tender nodule.          Diagnoses and all orders for this visit:    Actinic skin damage  History of actinic keratoses  - Explained to patient that actinic damage is a chronic life long condition.  Discussed the nature of actinic damage with patient.  Informed patient to prevent further damage and to lower the risk of skin cancer a broad spectrum sunscreen should be used SPF 30-50 reapplying every two hours while outdoors.       Seborrheic keratoses  Discussed benign nature of seborrheic keratoses in detail I informed  patient these are genetic finding and not related to chronic sun exposure.  Although they can appear abnormal in appearance they are noncancerous.  Encouraged patient to call should they note any new or changing lesions.     Lentigines   Discussed benign nature of lentigos, informed patient they are sun-induced brown flat lesions     Hemangioma of skin  Discussed benign nature of cherry angiomas in detail inform patient these are genetic and hormonally induced vascular growths with no potential for malignant transformation     Multiple benign nevi of left lower extremity  Multiple benign nevi of right lower extremity  Multiple benign nevi of left upper extremity  Multiple benign nevi of right upper extremity  Reassurance given for benign appearing nevi today, and to watch for any changes that would suggest atypia such as itching, bleeding, changing irregular shape, increased diameter or irregularity, and multiple colors in moles             I emphasized daily sunscreen use with an SPF of 30-50, broad spectrum and water resistant.  I directed reapplication every two hours.  I recommended wide brimmed hats.  Finally, we discussed danger signs of changing skin lesions including sores not healing and moles changing in size, shape or color.  Should any of these lesions occur before next appointment, I instructed patent to call sooner for evaluation.    Patient expresses understanding and agreement with the plan of care, and had no further questions at the end of the exam today.     Return in about 1 year (around 8/15/2023) for Skin check w MG.    Portions of this note may have been dictated using Dragon DMO voice recognition software.  Though the note has been proofread, small discrepencies may exist.  If a significant discrepancy is identified please alert me to further review.

## 2022-07-26 NOTE — PATIENT INSTRUCTIONS
"Avoid sunlight between the hours of 10 am and 2 pm, wear a broad spectrum, water resistant sunscreen with SPF of 30-50 year round. Reapply sunscreen every 2 hours and after exercising or swimming. Wearing a 6\" broad brimmed hat, a lip sunblock of SPF of 30-50, and sun protective clothing is also suggested year round.   Recommended sunscreens include Neutrogena Ultra Sheer Dry Touch SPF 50 or higher, Neutrogena Sheer Zinc Dry Touch SPF 50, and Vanicream Sport.  ---------------------------------------------------------------------  Monthly self-examination of your skin is important. Should any lesions, including moles, change in size, shape, color, itch, bleed or burn, contact our office for sooner evaluation.  ----------------------------------------------------------------------  **We are always looking for opportunities to improve your care and patient experience. You will be receiving a survey about your care via text or e-mail. If you come across questions in the survey that are not applicable to your visit, please leave these questions blank. Your satisfaction is important to us so please be honest. Your identity is kept confidential. Thank you!**     Discussed benign nature of seborrheic keratoses in detail I informed patient these are genetic finding and not related to chronic sun exposure.  Although they can appear abnormal in appearance they are noncancerous.  Encouraged patient to call should they note any new or changing lesions.     Discussed benign nature of lentigos, informed patient they are sun-induced brown flat lesions    Discussed benign nature of cherry angiomas in detail inform patient these are genetic and hormonally induced vascular growths with no potential for malignant transformation       "

## 2022-08-15 ENCOUNTER — OFFICE VISIT (OUTPATIENT)
Dept: DERMATOLOGY | Facility: CLINIC | Age: 67
End: 2022-08-15
Payer: MEDICARE

## 2022-08-15 DIAGNOSIS — Z87.2 HISTORY OF ACTINIC KERATOSES: ICD-10-CM

## 2022-08-15 DIAGNOSIS — L82.1 SEBORRHEIC KERATOSES: ICD-10-CM

## 2022-08-15 DIAGNOSIS — L81.4 LENTIGINES: ICD-10-CM

## 2022-08-15 DIAGNOSIS — L57.8 ACTINIC SKIN DAMAGE: Primary | ICD-10-CM

## 2022-08-15 DIAGNOSIS — D22.62 MULTIPLE BENIGN NEVI OF LEFT UPPER EXTREMITY: ICD-10-CM

## 2022-08-15 DIAGNOSIS — D22.61 MULTIPLE BENIGN NEVI OF RIGHT UPPER EXTREMITY: ICD-10-CM

## 2022-08-15 DIAGNOSIS — D22.72 MULTIPLE BENIGN NEVI OF LEFT LOWER EXTREMITY: ICD-10-CM

## 2022-08-15 DIAGNOSIS — D18.01 HEMANGIOMA OF SKIN: ICD-10-CM

## 2022-08-15 DIAGNOSIS — D22.71 MULTIPLE BENIGN NEVI OF RIGHT LOWER EXTREMITY: ICD-10-CM

## 2022-08-15 PROCEDURE — G0463 HOSPITAL OUTPT CLINIC VISIT: HCPCS | Mod: PO | Performed by: DERMATOLOGY

## 2022-08-15 PROCEDURE — 99213 OFFICE O/P EST LOW 20 MIN: CPT | Performed by: DERMATOLOGY

## 2022-08-15 ASSESSMENT — ENCOUNTER SYMPTOMS
BRUISES/BLEEDS EASILY: 0
FATIGUE: 0
FEVER: 0

## 2022-08-15 ASSESSMENT — PAIN SCALES - GENERAL: PAINLEVEL: 0-NO PAIN

## 2022-08-31 DIAGNOSIS — I10 ESSENTIAL HYPERTENSION: ICD-10-CM

## 2022-08-31 RX ORDER — METOPROLOL SUCCINATE 25 MG/1
TABLET, EXTENDED RELEASE ORAL
Qty: 90 TABLET | Refills: 3 | Status: SHIPPED | OUTPATIENT
Start: 2022-08-31 | End: 2023-09-11

## 2022-08-31 NOTE — TELEPHONE ENCOUNTER
Care Due:                  Date            Visit Type   Department     Provider  --------------------------------------------------------------------------------                              ESTABLISHED   [facility]  Last Visit: 09-      PATIENT      MEDICINE       [facility]                              ESTABLISHED                              PATIENT -    [facility]  Next Visit: 09-      CCT          MEDICINE       [facility]                                                            Last  Test          Frequency    Reason                     Performed    Due Date  --------------------------------------------------------------------------------  Lipid Panel.  12 months..  atorvastatin.............  Not Found    Overdue    [employer] Embedded Care Gaps. Reference number: [id number]. 8/31/2022 4:14:36 AM WONG

## 2022-09-04 ENCOUNTER — HEALTH MAINTENANCE LETTER (OUTPATIENT)
Age: 67
End: 2022-09-04

## 2022-09-07 ENCOUNTER — TELEPHONE (OUTPATIENT)
Dept: FAMILY MEDICINE | Facility: CLINIC | Age: 67
End: 2022-09-07
Payer: MEDICARE

## 2022-09-07 DIAGNOSIS — I10 ESSENTIAL HYPERTENSION: ICD-10-CM

## 2022-09-07 DIAGNOSIS — Z12.5 ENCOUNTER FOR SCREENING FOR MALIGNANT NEOPLASM OF PROSTATE: ICD-10-CM

## 2022-09-07 DIAGNOSIS — Z00.00 ROUTINE HEALTH MAINTENANCE: Primary | ICD-10-CM

## 2022-09-07 DIAGNOSIS — E78.5 HYPERLIPIDEMIA LDL GOAL <100: ICD-10-CM

## 2022-09-07 NOTE — TELEPHONE ENCOUNTER
Camilo has a annual physical coming up and he would like to get his labs done prior.  States that they are to be fasting labs and he also wants a PSA added.     Can these be placed and I will notify the patient.?     FYI:  I questioned if this should be a Medicare Wellness visit and the patient states that he does not qualify for those, that he is past his 1 year deadline.  I tired to explain, but he stated that he just needed a annual physical.  If this needs changed let me know.

## 2022-09-22 ENCOUNTER — OFFICE VISIT (OUTPATIENT)
Dept: FAMILY MEDICINE | Facility: CLINIC | Age: 67
End: 2022-09-22
Payer: MEDICARE

## 2022-09-22 ENCOUNTER — LAB (OUTPATIENT)
Dept: FAMILY MEDICINE | Facility: CLINIC | Age: 67
End: 2022-09-22
Payer: MEDICARE

## 2022-09-22 VITALS
OXYGEN SATURATION: 98 % | HEIGHT: 76 IN | DIASTOLIC BLOOD PRESSURE: 80 MMHG | HEART RATE: 73 BPM | WEIGHT: 245 LBS | BODY MASS INDEX: 29.83 KG/M2 | SYSTOLIC BLOOD PRESSURE: 146 MMHG

## 2022-09-22 DIAGNOSIS — Z13.6 ENCOUNTER FOR ABDOMINAL AORTIC ANEURYSM (AAA) SCREENING: ICD-10-CM

## 2022-09-22 DIAGNOSIS — E78.5 HYPERLIPIDEMIA LDL GOAL <100: ICD-10-CM

## 2022-09-22 DIAGNOSIS — I10 ESSENTIAL HYPERTENSION: ICD-10-CM

## 2022-09-22 DIAGNOSIS — Z00.00 ROUTINE HEALTH MAINTENANCE: ICD-10-CM

## 2022-09-22 DIAGNOSIS — Z87.891 HISTORY OF CIGARETTE SMOKING: ICD-10-CM

## 2022-09-22 DIAGNOSIS — B00.1 RECURRENT HERPES LABIALIS: ICD-10-CM

## 2022-09-22 DIAGNOSIS — Z23 IMMUNIZATION DUE: ICD-10-CM

## 2022-09-22 DIAGNOSIS — Z12.5 ENCOUNTER FOR SCREENING FOR MALIGNANT NEOPLASM OF PROSTATE: ICD-10-CM

## 2022-09-22 DIAGNOSIS — Z23 NEED FOR TDAP VACCINATION: Primary | ICD-10-CM

## 2022-09-22 LAB
ALBUMIN SERPL-MCNC: 4.2 G/DL (ref 3.5–5.3)
ALP SERPL-CCNC: 57 U/L (ref 45–115)
ALT SERPL-CCNC: 35 U/L (ref 7–52)
ANION GAP SERPL CALC-SCNC: 8 MMOL/L (ref 3–11)
AST SERPL-CCNC: 29 U/L
BASOPHILS # BLD AUTO: 0 10*3/UL
BASOPHILS NFR BLD AUTO: 0.5 % (ref 0–2)
BILIRUB SERPL-MCNC: 0.82 MG/DL (ref 0.2–1.4)
BUN SERPL-MCNC: 18 MG/DL (ref 7–25)
CALCIUM ALBUM COR SERPL-MCNC: 8.8 MG/DL (ref 8.6–10.3)
CALCIUM SERPL-MCNC: 9 MG/DL (ref 8.6–10.3)
CHLORIDE SERPL-SCNC: 105 MMOL/L (ref 98–107)
CHOLEST SERPL-MCNC: 153 MG/DL (ref 0–199)
CO2 SERPL-SCNC: 27 MMOL/L (ref 21–32)
CREAT SERPL-MCNC: 0.77 MG/DL (ref 0.7–1.3)
EOSINOPHIL # BLD AUTO: 0.1 10*3/UL
EOSINOPHIL NFR BLD AUTO: 2 % (ref 0–3)
ERYTHROCYTE [DISTWIDTH] IN BLOOD BY AUTOMATED COUNT: 14.1 % (ref 11.5–15)
FASTING STATUS PATIENT QL REPORTED: YES
GFR SERPL CREATININE-BSD FRML MDRD: 99 ML/MIN/1.73M*2
GLUCOSE SERPL-MCNC: 95 MG/DL (ref 70–105)
HCT VFR BLD AUTO: 46.3 % (ref 38–50)
HDLC SERPL-MCNC: 61 MG/DL
HGB BLD-MCNC: 15.8 G/DL (ref 13.2–17.2)
LDLC SERPL CALC-MCNC: 82 MG/DL (ref 20–99)
LYMPHOCYTES # BLD AUTO: 1.1 10*3/UL
LYMPHOCYTES NFR BLD AUTO: 25.5 % (ref 15–47)
MCH RBC QN AUTO: 33.1 PG (ref 29–34)
MCHC RBC AUTO-ENTMCNC: 34.2 G/DL (ref 32–36)
MCV RBC AUTO: 96.6 FL (ref 82–97)
MONOCYTES # BLD AUTO: 0.5 10*3/UL
MONOCYTES NFR BLD AUTO: 10.6 % (ref 5–13)
NEUTROPHILS # BLD AUTO: 2.7 10*3/UL
NEUTROPHILS NFR BLD AUTO: 61.4 % (ref 46–70)
PLATELET # BLD AUTO: 205 10*3/UL (ref 130–350)
PMV BLD AUTO: 7 FL (ref 6.9–10.8)
POTASSIUM SERPL-SCNC: 4.1 MMOL/L (ref 3.5–5.1)
PROT SERPL-MCNC: 6.7 G/DL (ref 6–8.3)
PSA SERPL-MCNC: 1.83 NG/ML (ref 0–4)
RBC # BLD AUTO: 4.79 10*6/ΜL (ref 4.1–5.8)
SODIUM SERPL-SCNC: 140 MMOL/L (ref 135–145)
TRIGL SERPL-MCNC: 49 MG/DL
WBC # BLD AUTO: 4.3 10*3/UL (ref 3.7–9.6)

## 2022-09-22 PROCEDURE — 85025 COMPLETE CBC W/AUTO DIFF WBC: CPT

## 2022-09-22 PROCEDURE — 80061 LIPID PANEL: CPT

## 2022-09-22 PROCEDURE — 90677 PCV20 VACCINE IM: CPT | Performed by: FAMILY MEDICINE

## 2022-09-22 PROCEDURE — 90471 IMMUNIZATION ADMIN: CPT | Performed by: FAMILY MEDICINE

## 2022-09-22 PROCEDURE — G0463 HOSPITAL OUTPT CLINIC VISIT: HCPCS

## 2022-09-22 PROCEDURE — G0438 PPPS, INITIAL VISIT: HCPCS | Mod: GZ | Performed by: FAMILY MEDICINE

## 2022-09-22 PROCEDURE — 90715 TDAP VACCINE 7 YRS/> IM: CPT | Performed by: FAMILY MEDICINE

## 2022-09-22 PROCEDURE — 36415 COLL VENOUS BLD VENIPUNCTURE: CPT

## 2022-09-22 PROCEDURE — 99397 PER PM REEVAL EST PAT 65+ YR: CPT | Performed by: FAMILY MEDICINE

## 2022-09-22 PROCEDURE — 80053 COMPREHEN METABOLIC PANEL: CPT

## 2022-09-22 PROCEDURE — 84153 ASSAY OF PSA TOTAL: CPT

## 2022-09-22 RX ORDER — LATANOPROST 50 UG/ML
1 SOLUTION/ DROPS OPHTHALMIC NIGHTLY
COMMUNITY
Start: 2022-06-21 | End: 2023-09-18 | Stop reason: ALTCHOICE

## 2022-09-22 RX ORDER — VALACYCLOVIR HYDROCHLORIDE 1 G/1
2000 TABLET, FILM COATED ORAL 2 TIMES DAILY
Qty: 4 TABLET | Refills: 6 | Status: SHIPPED | OUTPATIENT
Start: 2022-09-22 | End: 2023-01-16 | Stop reason: SDUPTHER

## 2022-09-22 RX ORDER — ATORVASTATIN CALCIUM 40 MG/1
40 TABLET, FILM COATED ORAL DAILY
Qty: 90 TABLET | Refills: 3 | Status: SHIPPED | OUTPATIENT
Start: 2022-09-22 | End: 2023-09-18 | Stop reason: SDUPTHER

## 2022-09-22 RX ORDER — VALACYCLOVIR HYDROCHLORIDE 1 G/1
2000 TABLET, FILM COATED ORAL 2 TIMES DAILY
Qty: 4 TABLET | Refills: 0 | Status: SHIPPED | OUTPATIENT
Start: 2022-09-22 | End: 2022-09-22

## 2022-09-22 ASSESSMENT — ENCOUNTER SYMPTOMS
FEVER: 0
NAUSEA: 0
SHORTNESS OF BREATH: 0
CHILLS: 0
VOMITING: 0
PALPITATIONS: 0

## 2022-09-22 NOTE — PROGRESS NOTES
Subjective     Blaise Elaine is a 67 y.o. year old male who presents for an annual physical  Chief Complaint   Patient presents with    Annual Exam   .      HPI     The patient reports that he has a hemorrhoid that bleeds once in a while. He denies any pain associated with it. He denies any change in size or obstruction of his bowel movements.    The patient is up to date on his colon cancer screening. His last colon cancer screening was 10/17/2017 at [place] . He reports that he had a polyp on one of them and then they did another one a year later and they told him that it was all good. The results were moderately excessive looping, no evidence of residual polyp tissue.  He reports that he is due for a colonoscopy next spring.    The patient reports that he has had the zoster vaccine, but not the new Shingrix vaccine. His last tetanus shot was in 2010. He would like to get the pneumonia vaccine.    The patient reports that he smoked for the first 5 years out of high school 50 years ago.    The patient reports that he needs refills on his atorvastatin 40 mg.    The patient reports that he had an outbreak of cold sores a couple of weeks ago. He reports that he used to get them a lot when he was working. He reports that since he retired, he has not had them much at all. He reports that a couple of weeks ago, he exploded with them. He reports that he thinks it is some stress of losing both of his parents shortly.          Allergies   Allergen Reactions    Morphine Nausea And Vomiting         Current Outpatient Medications on File Prior to Visit   Medication Sig    latanoprost (XALATAN) 0.005 % ophthalmic solution Administer 1 drop into the right eye nightly    metoprolol succinate XL (TOPROL-XL) 25 mg 24 hr tablet TAKE 1 TABLET(25 MG) BY MOUTH DAILY    glucosamine HCl/chondroitin schmidt (GLUCOSAMINE-CHONDROITIN ORAL) Take by mouth daily    multivitamin with minerals tablet Take 1 tablet by mouth daily     No  current facility-administered medications on file prior to visit.        Past Medical History:   Diagnosis Date    Hypertension 2016     Social History     Socioeconomic History    Marital status:    Tobacco Use    Smoking status: Former     Packs/day: 1.00     Years: 5.00     Pack years: 5.00     Types: Cigarettes     Quit date: 1981     Years since quittin.1    Smokeless tobacco: Never   Substance and Sexual Activity    Alcohol use: Yes     Alcohol/week: 14.0 standard drinks     Types: 14 Shots of liquor per week     Comment: 2 to 3 drinks before dinner    Drug use: Not Currently       Review of Systems   Constitutional:  Negative for chills and fever.   Eyes:  Negative for visual disturbance.   Respiratory:  Negative for shortness of breath.    Cardiovascular:  Negative for chest pain and palpitations.   Gastrointestinal:  Negative for nausea and vomiting.   All other systems reviewed and are negative.    Objective     Vitals:    22 1021   BP: 146/80   Pulse: 73   SpO2: 98%       Wt Readings from Last 3 Encounters:   22 111.1 kg (245 lb)   09/15/21 106.1 kg (234 lb)   20 100.2 kg (221 lb)     Temp Readings from Last 3 Encounters:   09/15/21 36.7 °C (98.1 °F) (Oral)   21 36.6 °C (97.8 °F) (Temporal)   20 36.7 °C (98.1 °F) (Oral)     BP Readings from Last 3 Encounters:   22 146/80   09/15/21 122/72   21 149/78     Pulse Readings from Last 3 Encounters:   22 73   09/15/21 77   21 78       Physical Exam  Vitals reviewed.   Constitutional:       Appearance: Normal appearance.   HENT:      Head: Normocephalic and atraumatic.      Ears:      Comments: Patient does not have any eardrum on his right side.     Mouth/Throat:      Mouth: Mucous membranes are moist.      Pharynx: Oropharynx is clear.   Eyes:      Extraocular Movements: Extraocular movements intact.      Conjunctiva/sclera: Conjunctivae normal.   Cardiovascular:      Rate and Rhythm: Normal  rate and regular rhythm.      Pulses: Normal pulses.      Heart sounds: Normal heart sounds. No murmur heard.  Pulmonary:      Effort: Pulmonary effort is normal. No respiratory distress.      Breath sounds: Normal breath sounds. No wheezing.   Musculoskeletal:         General: No deformity.   Skin:     General: Skin is warm and dry.   Neurological:      General: No focal deficit present.      Mental Status: He is alert.   Psychiatric:         Mood and Affect: Mood normal.         Behavior: Behavior normal.       Recent Results (from the past 24 hour(s))   Comprehensive metabolic panel Blood, Venous    Collection Time: 09/22/22  7:48 AM   Result Value Ref Range    Sodium 140 135 - 145 mmol/L    Potassium 4.1 3.5 - 5.1 MMOL/L    Chloride 105 98 - 107 mmol/L    CO2 27 21 - 32 mmol/L    Anion Gap 8 3 - 11 mmol/L    BUN 18 7 - 25 mg/dL    Creatinine 0.77 0.70 - 1.30 mg/dL    Glucose 95 70 - 105 mg/dL    Calcium 9.0 8.6 - 10.3 mg/dL    AST 29 <40 U/L    ALT (SGPT) 35 7 - 52 U/L    Alkaline Phosphatase 57 45 - 115 U/L    Total Protein 6.7 6.0 - 8.3 g/dL    Albumin 4.2 3.5 - 5.3 g/dL    Total Bilirubin 0.82 0.20 - 1.40 mg/dL    Corrected Calcium 8.8 8.6 - 10.3 mg/dL    eGFR 99 >60 mL/min/1.73m*2   CBC w/auto differential Blood, Venous    Collection Time: 09/22/22  7:48 AM   Result Value Ref Range    WBC 4.3 3.7 - 9.6 10*3/uL    RBC 4.79 4.10 - 5.80 10*6/µL    Hemoglobin 15.8 13.2 - 17.2 g/dL    Hematocrit 46.3 38.0 - 50.0 %    MCV 96.6 82.0 - 97.0 fL    MCH 33.1 29.0 - 34.0 pg    MCHC 34.2 32.0 - 36.0 g/dL    RDW 14.1 11.5 - 15.0 %    Platelets 205 130 - 350 10*3/uL    MPV 7.0 6.9 - 10.8 fL    Neutrophils% 61.4 46.0 - 70.0 %    Lymphocytes% 25.5 15.0 - 47.0 %    Monocytes% 10.6 5.0 - 13.0 %    Eosinophils% 2.0 0.0 - 3.0 %    Basophils% 0.5 0.0 - 2.0 %    ANC (auto diff) 2.70 10*3/UL    Lymphocytes Absolute 1.10 10*3/uL    Monocytes Absolute 0.50 10*3/uL    Eosinophils Absolute 0.10 10*3/uL    Basophils Absolute 0.00  10*3/uL   Lipid panel Blood, Venous    Collection Time: 09/22/22  7:48 AM   Result Value Ref Range    Triglycerides 49 <=149 mg/dL    Cholesterol 153 0 - 199 mg/dL    HDL 61 >=40 mg/dL    LDL Calculated 82 20 - 99 mg/dL    Fasting? Yes          Assessment     1. Annual wellness visit  Overall, patient is doing quite well. I encouraged him to continue efforts with regular healthy diet and exercise. He is needing a refill of his atorvastatin which we will go ahead and refill today. He is also due for colonoscopy starting next month. He plans on doing this next spring. Patient encouraged to notify the clinic when he is ready for a referral and we will provide this for him. He is also due for some vaccines including tetanus shot and PCV 20 pneumonia vaccine which will be administered today in clinic.    2. Hyperlipidemia  Refill of atorvastatin provided. He denies any concerns with this medication or side effects. Follow up in 1 year or sooner if needed.        ATTESTATION:     Draft generated by [employer] KARLY and edited by Cricket Castillo, Quality  on 09/22/22.

## 2022-10-10 ENCOUNTER — HOSPITAL ENCOUNTER (OUTPATIENT)
Dept: ULTRASOUND IMAGING | Facility: HOSPITAL | Age: 67
Discharge: HOME | End: 2022-10-10
Payer: MEDICARE

## 2022-10-10 PROCEDURE — 76706 US ABDL AORTA SCREEN AAA: CPT

## 2022-11-10 ENCOUNTER — IMMUNIZATION (OUTPATIENT)
Dept: FAMILY MEDICINE | Facility: CLINIC | Age: 67
End: 2022-11-10
Payer: MEDICARE

## 2022-11-10 DIAGNOSIS — Z23 ENCOUNTER FOR VACCINATION: Primary | ICD-10-CM

## 2022-11-10 PROCEDURE — 0134A MODERNA BIVALENT BOOSTER SARS-COV-2 VACCINE (BOOSTER): CPT | Performed by: FAMILY MEDICINE

## 2022-11-10 PROCEDURE — 90694 VACC AIIV4 NO PRSRV 0.5ML IM: CPT | Performed by: FAMILY MEDICINE

## 2023-01-16 ENCOUNTER — TELEPHONE (OUTPATIENT)
Dept: FAMILY MEDICINE | Facility: CLINIC | Age: 68
End: 2023-01-16
Payer: MEDICARE

## 2023-01-16 DIAGNOSIS — B00.1 RECURRENT HERPES LABIALIS: ICD-10-CM

## 2023-01-16 RX ORDER — VALACYCLOVIR HYDROCHLORIDE 1 G/1
2000 TABLET, FILM COATED ORAL 2 TIMES DAILY
Qty: 4 TABLET | Refills: 6 | Status: SHIPPED | OUTPATIENT
Start: 2023-01-16 | End: 2023-09-18 | Stop reason: ALTCHOICE

## 2023-01-16 NOTE — TELEPHONE ENCOUNTER
Patient needs refill sent in for his valACYclovir (VALTREX) 1 gram tablet can he get 4 refills please advise    Send to [facility] phone number is [phone] [place]

## 2023-05-30 ENCOUNTER — TELEPHONE (OUTPATIENT)
Dept: FAMILY MEDICINE | Facility: CLINIC | Age: 68
End: 2023-05-30
Payer: MEDICARE

## 2023-05-30 DIAGNOSIS — Z80.0 FAMILY HISTORY OF COLON CANCER: Primary | ICD-10-CM

## 2023-05-30 NOTE — TELEPHONE ENCOUNTER
Camilo would like to get a referral for a colonoscopy.      Please let me know when one has been placed and I will contact the patient

## 2023-07-18 ENCOUNTER — TELEPHONE (OUTPATIENT)
Dept: GASTROENTEROLOGY | Facility: CLINIC | Age: 68
End: 2023-07-18
Payer: MEDICARE

## 2023-07-18 DIAGNOSIS — Z12.11 COLON CANCER SCREENING: Primary | ICD-10-CM

## 2023-07-18 RX ORDER — POLYETHYLENE GLYCOL 3350, SODIUM SULFATE ANHYDROUS, SODIUM BICARBONATE, SODIUM CHLORIDE, POTASSIUM CHLORIDE 236; 22.74; 6.74; 5.86; 2.97 G/4L; G/4L; G/4L; G/4L; G/4L
POWDER, FOR SOLUTION ORAL
Qty: 4000 ML | Refills: 0 | Status: SHIPPED | OUTPATIENT
Start: 2023-07-18 | End: 2023-09-18 | Stop reason: ALTCHOICE

## 2023-07-18 NOTE — TELEPHONE ENCOUNTER
Patient called and colonoscopy scheduled for 08/03/2023.  Education done on colonoscopy prep with patient.  Colyte sent to pharmacy of patients choosing, [facility], [place].  Address confirmed and written instruction mailed to patient.  Instructions also sent through [employer]. Patient states understanding and denies question or concerns.   Please check in at: 10:00 AM Your procedure starts at: 11:00 AM   [facility] Admissions Desk is located on the south side of the [facility], off [address].   If your colonoscopy examination is to be of the greatest value, you must follow and complete instructions below.   Five Days Before the Exam   If you are taking a provider prescribed blood thinners such as Warfarin (Coumadin), please contact the prescribing provider for instructions on the discontinuation of these medications.   Two Days Before the Exam   Do not eat any fruits or vegetables (fresh, canned, or cooked).   Diet is otherwise unrestricted on this day.   Continue all medication except:   Vitamins containing iron   Iron tablets   Lomotil (diphenoxylate)   Bulk laxatives   One Day Before the Exam   Continue all medication except:   Vitamins containing iron   Iron tablets   Lomotil (diphenoxylate)   Bulk laxatives   Drink ONLY CLEAR LIQUIDS for breakfast, lunch, and dinner. NO SOLID foods, milk, or dairy products. NO CLEAR LIQUIDS DARK IN COLOR SUCH AS COFFEE, TEA, COLA, OR COLORED RED or PURPLE. You may have clear liquids that include the following:   Strained fruit juices without pulp (apple, white grape, lemonade)   Water   Clear broth or bouillon   Carbonated and non-carbonated soft drinks   Fruit flavored drinks   Plain Jell-O (without added fruit or toppings)   Popsicles   Sometime during this day mix the Colyte with one gallon of water, preferably distilled water.   At 6:00 pm begin drinking 8 oz (1 cup) of Colyte every 15 minutes.   Drink only ½ of the gallon of Colyte between 6:00 pm and 10:00 pm. This  WILL cause diarrhea.   You may want to flavor the Colyte with lemon juice or lime juice. Examples may be ReaLemon or ReaLime. Add the flavor to the glass and not the whole gallon, in case you don’t like the flavor.   You may drink other clear liquids (not colored red or purple) in addition to the Colyte during this evening if you wish.   The Day of the Examination   Finish the last ½ gallon of the Colyte. Do this before 5:00 am. You may also have clear liquids up to 5:00 am.   DO NOT eat or drink anything after 5:00 am.   Because you will be given sedating medication for your procedure, you must arrange for someone to drive for you.   The Endoscopy Department prefers your  stays with you at the hospital until you are able to go home, they have someone to contact if needed.   Since sedative drugs decreases your reflexes just like alcohol, you should not drive at all on this day.    Tips for a Successful Colonoscopy Preparation   You may:   Use crystal light packets (no red or purple) to flavor your prep. Add the flavor to the glass and not the entire gallon, as you may not like the flavor.   Drink clear liquids while drinking your prep if you are thirsty (broth, Jell-O, pop... nothing red or purple).   Chew sugar-free gum.   Use A+D Ointment, Vaseline, or Desitin if rectal irritation occurs.   Use flushable pre-moistened wipes.   If you start to feel nauseated, take a break for 30 minutes and start again when you feel better.   The goal of the colonoscopy preparation is to eliminate all stool (fecal matter) from the colon so that the provider will have a clear view. If the bowel prep is not good enough, polyps and lesions can be missed, the colonoscopy may take longer, or the whole process may need to be repeated or rescheduled. It is important to drink ALL the prep, even if your stools are clear.   If you are diabetic taking insulin or oral medications, you need to contact the prescribing provider for  necessary dosage adjustments.   Remember: Bring your picture ID, insurance cards, and a list of all your medications.   IMPORTANT INFORMATION - Please note that the providers use CRNA sedation (Certified Registered Nurse Anesthetist) for this procedure. You are responsible for checking with your insurance company to verify coverage. This will prevent you from receiving any unexpected bills after your procedure.    Please call our gastroenterology caregivers with any questions [phone]

## 2023-08-02 NOTE — PROGRESS NOTES
Subjective   Return Dermatology Skin examination  Blaise Elaine is a 68 y.o. male with a history of Actinic Keratoses who presents for a full body skin exam . Lesions of concern include: neck.            Review of Systems   Constitutional:  Negative for fatigue and fever.   Skin:  Negative for rash.   Allergic/Immunologic: Negative for environmental allergies and immunocompromised state.   Hematological:  Bruises/bleeds easily.   All other systems reviewed and are negative.        Past Medical History:   Diagnosis Date    Hypertension 2016       Current Outpatient Medications on File Prior to Visit   Medication Sig Dispense Refill    metoprolol succinate XL (TOPROL-XL) 25 mg 24 hr tablet TAKE 1 TABLET(25 MG) BY MOUTH DAILY 90 tablet 3    glucosamine HCl/chondroitin schmidt (GLUCOSAMINE-CHONDROITIN ORAL) Take by mouth daily      multivitamin with minerals tablet Take 1 tablet by mouth daily      polyethylene glycol (GoLYTELY) 236-22.74-6.74 -5.86 gram solution Begin drinking by 6 PM on day prior to procedure drink 8 ounces every 15 minutes 4000 mL 0    valACYclovir (VALTREX) 1 gram tablet Take 2 tablets (2,000 mg total) by mouth 2 (two) times a day 4 tablet 6    latanoprost (XALATAN) 0.005 % ophthalmic solution Administer 1 drop into the right eye nightly      atorvastatin (LIPITOR) 40 mg tablet Take 1 tablet (40 mg total) by mouth daily 90 tablet 3     No current facility-administered medications on file prior to visit.       Allergies  Morphine    The following have been reviewed and updated as appropriate in this visit          Physical Examination    Vital Signs:  vitals were not taken for this visit.         Physical Exam Findings:     Constitutional: General Appearance: healthy-appearing, well-nourished, and well-developed. Level of Distress: NAD. Ambulation: ambulating normally.  Psychiatric: Insight: good judgement. Mental Status: normal mood and affect and active and alert. Orientation: to time, place,  and person. Memory: recent memory normal and remote memory normal.  Head: normocephalic and atraumatic.  Eyes: Lids and Conjunctivae: no discharge or pallor and non-injected. Lens: clear. Sclerae: non-icteric.  Neurologic: Gait and Station: normal gait and station. Cranial Nerves: grossly intact. Coordination and Cerebellum: no tremor.      Lymph nodes exam:  Negative post auricular, clavicular, axillary adenopathy.     Gan skin type:I    Exam: Stuck on tan brown papules, well demarcated tan brown macules, bright red cherry papules, pink papules with regular borders and pigmentation, Non blanching purpuric patches overlying easily traumatized surfaces dorsal forearms          Wearing socks: no  Wearing underwear: yes  Fingernails are polished upon exam today: no  Toenails are polished upon exam today: no  Makeup on exam today: no             Diagnoses and all orders for this visit:    History of actinic keratoses    Actinic skin damage  - Explained to patient that actinic damage is a chronic life long condition.  Discussed the nature of actinic damage with patient.  Informed patient to prevent further damage and to lower the risk of skin cancer a broad spectrum sunscreen should be used SPF 30-50 reapplying every two hours while outdoors.      Seborrheic keratoses  Discussed benign nature of Seborrheic Keratoses in detail. I informed patient these are a genetic finding and not related to chronic sun exposure.  Although they can appear abnormal in appearance they are noncancerous.  Encouraged patient to call should they note any new or changing lesions.    Destruction (freezing) of benign lesions are cosmetic and not covered by insurance. I explained that cryotherapy does not have a 100% success rate and the patient may require further treatment for clearance.  This will be an additional cost for the next treatment. Estimated price for treatment is $520 for up to 14 lesions, $573 for more than 14 lesions but  theses prices are subject to change at any time.     Lentigines  Discussed benign nature of lentigos, informed patient they are sun-induced brown flat lesions.     Cherry hemangioma  Discussed benign nature of Mtz Angiomas in detail. Informed patient these are genetic and hormonally induced vascular growths with no potential for malignant transformation. Removal is cosmetic and not covered by insurance.     Multiple benign nevi of torso  Multiple benign nevi of right upper extremity  Multiple benign nevi of left upper extremity  Multiple benign nevi of right lower extremity  Multiple benign nevi of left lower extremity  Reassurance given for benign appearing nevi today, and to watch for any changes that would suggest atypia such as itching, bleeding, changing irregular shape, increased diameter or irregularity, and multiple colors in moles.    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 Informed patient this is a chronic life long problem which can be worsened by systemic blood thinners. There is no effective treatment, but avoidance of trauma can lead to fewer patches.         Return in about 1 year (around 8/14/2024) for skin check.      I emphasized daily sunscreen use with an SPF of 30-50, broad spectrum and water resistant.  I directed reapplication every two hours.  I recommended wide brimmed hats.  Finally, we discussed danger signs of changing skin lesions including sores not healing and moles changing in size, shape or color.  Should any of these lesions occur before next appointment, I instructed patent to call sooner for evaluation.    Patient expresses understanding and agreement with the plan of care, and had no further questions at the end of the exam today.     Portions of this note may have been dictated using Dragon DMO voice recognition software.  Though the note has been proofread, small discrepencies may exist.  If a significant discrepancy is identified please  alert me to further review.

## 2023-08-03 ENCOUNTER — ANESTHESIA (OUTPATIENT)
Dept: GASTROENTEROLOGY | Facility: HOSPITAL | Age: 68
End: 2023-08-03
Payer: MEDICARE

## 2023-08-03 ENCOUNTER — HOSPITAL ENCOUNTER (OUTPATIENT)
Facility: HOSPITAL | Age: 68
Setting detail: OUTPATIENT SURGERY
Discharge: HOME | End: 2023-08-03
Attending: INTERNAL MEDICINE | Admitting: INTERNAL MEDICINE
Payer: MEDICARE

## 2023-08-03 ENCOUNTER — ANESTHESIA EVENT (OUTPATIENT)
Dept: GASTROENTEROLOGY | Facility: HOSPITAL | Age: 68
End: 2023-08-03
Payer: MEDICARE

## 2023-08-03 VITALS
TEMPERATURE: 97 F | BODY MASS INDEX: 29.82 KG/M2 | RESPIRATION RATE: 15 BRPM | HEART RATE: 61 BPM | DIASTOLIC BLOOD PRESSURE: 81 MMHG | HEIGHT: 76 IN | OXYGEN SATURATION: 97 % | SYSTOLIC BLOOD PRESSURE: 140 MMHG

## 2023-08-03 PROCEDURE — 00811 ANES LWR INTST NDSC NOS: CPT | Mod: PT

## 2023-08-03 PROCEDURE — (BLANK): Performed by: INTERNAL MEDICINE

## 2023-08-03 PROCEDURE — 88305 TISSUE EXAM BY PATHOLOGIST: CPT

## 2023-08-03 PROCEDURE — 45385 COLONOSCOPY W/LESION REMOVAL: CPT | Mod: PT | Performed by: INTERNAL MEDICINE

## 2023-08-03 PROCEDURE — C1889 IMPLANT/INSERT DEVICE, NOC: HCPCS | Performed by: INTERNAL MEDICINE

## 2023-08-03 PROCEDURE — 6360000200 HC RX 636 W HCPCS (ALT 250 FOR IP)

## 2023-08-03 PROCEDURE — 2720000000 HC SUPP 272 WO HCPCS: Performed by: INTERNAL MEDICINE

## 2023-08-03 PROCEDURE — (BLANK) HC RECOVERY PHASE-2 1ST 1/2 HOUR ACUITY LEVEL 2: Performed by: INTERNAL MEDICINE

## 2023-08-03 PROCEDURE — 2580000300 HC RX 258

## 2023-08-03 PROCEDURE — (BLANK) HC MAC ANESTHESIA FACILITY CHARGE EACH ADDITIONAL MIN: Performed by: INTERNAL MEDICINE

## 2023-08-03 PROCEDURE — (BLANK) HC MAC ANESTHESIA FACILITY CHARGE 1ST 15 MIN: Performed by: INTERNAL MEDICINE

## 2023-08-03 RX ORDER — PROPOFOL 10 MG/ML
INJECTION, EMULSION INTRAVENOUS AS NEEDED
Status: DISCONTINUED | OUTPATIENT
Start: 2023-08-03 | End: 2023-08-03 | Stop reason: SURG

## 2023-08-03 RX ORDER — PROPOFOL 10 MG/ML
INJECTION, EMULSION INTRAVENOUS CONTINUOUS PRN
Status: DISCONTINUED | OUTPATIENT
Start: 2023-08-03 | End: 2023-08-03 | Stop reason: SURG

## 2023-08-03 RX ORDER — SODIUM CHLORIDE, SODIUM LACTATE, POTASSIUM CHLORIDE, CALCIUM CHLORIDE 600; 310; 30; 20 MG/100ML; MG/100ML; MG/100ML; MG/100ML
INJECTION, SOLUTION INTRAVENOUS CONTINUOUS PRN
Status: DISCONTINUED | OUTPATIENT
Start: 2023-08-03 | End: 2023-08-03 | Stop reason: SURG

## 2023-08-03 RX ADMIN — PROPOFOL 200 MCG/KG/MIN: 10 INJECTION, EMULSION INTRAVENOUS at 10:59

## 2023-08-03 RX ADMIN — PROPOFOL 50 MG: 10 INJECTION, EMULSION INTRAVENOUS at 10:59

## 2023-08-03 RX ADMIN — SODIUM CHLORIDE, POTASSIUM CHLORIDE, SODIUM LACTATE AND CALCIUM CHLORIDE: 600; 310; 30; 20 INJECTION, SOLUTION INTRAVENOUS at 10:57

## 2023-08-03 ASSESSMENT — ENCOUNTER SYMPTOMS: EXERCISE TOLERANCE: GOOD (4-7 METS)

## 2023-08-03 NOTE — PERIOPERATIVE NURSING NOTE
Dr. Loja at bedside 1146 to discuss procedure and post procedure information with pt and significant other. Discharge instructions given verbally and in written from to pt and significant other. Verbalized understanding. All questions answered at this time.     Pt states no pain and no nausea. Pt tolerating ambulation.

## 2023-08-03 NOTE — ANESTHESIA POSTPROCEDURE EVALUATION
Patient: Blaise Elaine    Procedure Summary     Date: 08/03/23 Room / Location: [facility] ENDO 02 / [facility] Endoscopy    Anesthesia Start: 1056 Anesthesia Stop: 1135    Procedure: SCREENING COLONOSCOPY with Polypectomies (Anus) Diagnosis:       Colon cancer screening      (Colon Polyps )    Providers: Elan Loja MD Responsible Provider: Tigre Akins MD    Anesthesia Type: MAC ASA Status: 2          Anesthesia Type: MAC    Last vitals  Vitals Value Taken Time   /81 08/03/23 1145   Temp 36.1 °C (97 °F) 08/03/23 1132   Pulse 61 08/03/23 1145   Resp 15 08/03/23 1145   SpO2 97 % 08/03/23 1145   0-10 Pain Score 0 - No pain 08/03/23 1145       Anesthesia Post Evaluation    Patient location during evaluation: PACU  Patient participation: complete - patient participated  Level of consciousness: awake and alert  Pain management: adequate  Airway patency: patent  Anesthetic complications: no  Cardiovascular status: acceptable  Respiratory status: acceptable  Hydration status: acceptable  May dismiss recovered patient based on consultation with the appropriate physicians and/or meeting appropriate discharge criteria      Cosmetic?  This procedure is not cosmetic.

## 2023-08-03 NOTE — ANESTHESIA PREPROCEDURE EVALUATION
"Pre-Procedure Assessment    Patient: Blaise Elaine, male, 68 y.o.    Ht Readings from Last 1 Encounters:   22 1.93 m (6' 4\")     Wt Readings from Last 1 Encounters:   22 111.1 kg (245 lb)       Last Vitals  BP      Temp      Pulse     Resp      SpO2      Pain Score         Problem list reviewed and Medical history reviewed           Airway   Mallampati: II  TM distance: >3 FB  Neck ROM: full      Dental      Pulmonary - negative ROS and normal exam   Cardiovascular   Exercise tolerance: good (4-7 METS)  (+) hypertension,     Rhythm: regular  Rate: normal    Mental Status/Neuro/Psych - negative ROS   Pt is alert.        GI/Hepatic/Renal - negative ROS     Endo/Other - negative ROS   Abdominal           Social History     Tobacco Use   • Smoking status: Former     Packs/day: 1.00     Years: 5.00     Pack years: 5.00     Types: Cigarettes     Quit date: 1981     Years since quittin.0   • Smokeless tobacco: Never   Substance Use Topics   • Alcohol use: Yes     Alcohol/week: 14.0 standard drinks of alcohol     Types: 14 Shots of liquor per week     Comment: 2 to 3 drinks before dinner      Hematology   WBC   Date Value Ref Range Status   2022 4.3 3.7 - 9.6 10*3/uL Final     RBC   Date Value Ref Range Status   2022 4.79 4.10 - 5.80 10*6/µL Final     MCV   Date Value Ref Range Status   2022 96.6 82.0 - 97.0 fL Final     Hemoglobin   Date Value Ref Range Status   2022 15.8 13.2 - 17.2 g/dL Final     Hematocrit   Date Value Ref Range Status   2022 46.3 38.0 - 50.0 % Final     Platelets   Date Value Ref Range Status   2022 205 130 - 350 10*3/uL Final      Coagulation No results found for: PT, APTT, INR   General Chemistry   Calcium   Date Value Ref Range Status   2022 9.0 8.6 - 10.3 mg/dL Final     BUN   Date Value Ref Range Status   2022 18 7 - 25 mg/dL Final     Creatinine   Date Value Ref Range Status   2022 0.77 0.70 - 1.30 mg/dL Final "     Glucose   Date Value Ref Range Status   09/22/2022 95 70 - 105 mg/dL Final     Sodium   Date Value Ref Range Status   09/22/2022 140 135 - 145 mmol/L Final     Potassium   Date Value Ref Range Status   09/22/2022 4.1 3.5 - 5.1 MMOL/L Final     CO2   Date Value Ref Range Status   09/22/2022 27 21 - 32 mmol/L Final     Chloride   Date Value Ref Range Status   09/22/2022 105 98 - 107 mmol/L Final     Anesthesia Plan    ASA 2   NPO status reviewed: > 6 hours    MAC         Induction: intravenous                 Anesthetic plan and risks discussed with patient.

## 2023-08-03 NOTE — POST-PROCEDURE NOTE
Please refer to the Media tab for detailed endoscopy procedure documentation.     Elan Loja MD  Gastroenterology

## 2023-08-03 NOTE — H&P
[facility] Gastroenterology-Advanced Endoscopy Pre-Procedure Note    HPI:    Blaise Elaine is a 68 y.o. male presents for a screening colonoscopy due to a history of colon polyps 5 years ago as well as brother who was diagnosed with colon cancer in his sixth decade of life.    ROS:  Review of system was negative except for hypertension.    Physical Exam:  Vitals:      Constitutional: Pt is alert and oriented to person, place, and time.   HEENT: Normocephalic and atraumatic. Pupils are equal, round, and reactive to light. EOM intact.    Cardiovascular: Normal rate, regular rhythm.  Pulmonary/Chest: Effort normal and breath sounds normal. No respiratory distress.  Abdominal: Soft. Normoactive bowel sounds.  Non-tender.  Non-distended.    Medications:  Scheduled Meds:  PRN Meds:     Labs:  Lab Results   Component Value Date    WBC 4.3 09/22/2022    HGB 15.8 09/22/2022    HCT 46.3 09/22/2022    MCV 96.6 09/22/2022     09/22/2022      No results found for: APTT No results found for: INR, PT   Lab Results   Component Value Date    GLUCOSE 95 09/22/2022    CALCIUM 9.0 09/22/2022     09/22/2022    K 4.1 09/22/2022    CO2 27 09/22/2022     09/22/2022    BUN 18 09/22/2022    CREATININE 0.77 09/22/2022    ANIONGAP 8 09/22/2022      Lab Results   Component Value Date    BILITOT 0.82 09/22/2022    AST 29 09/22/2022    ALT 35 09/22/2022    PROT 6.7 09/22/2022    ALBUMIN 4.2 09/22/2022    ALKPHOS 57 09/22/2022          Assessment & Plan:    Patient is a 68 y.o. male with first-degree relative with colon cancer prior to age 60 and personal history of colon polyps    Plan:  -Proceed with planned screening colonoscopy    I discussed the indication, benefits, alternatives, and procedure itself.  I explicitly discussed the risks of missed lesions, bleeding (1%), infection, perforation (<0.1%), incomplete resection, anesthesia complications (aspiration, cardiac or respiratory compromise, death) and for  ERCP, pancreatitis (3-10%).  Given this information and of sound mind and body the patient wished to proceed with the procedure and expressed understanding.     Elan Loja MD  Gastroenterology-Advanced Endoscopy

## 2023-08-14 ENCOUNTER — OFFICE VISIT (OUTPATIENT)
Dept: DERMATOLOGY | Facility: CLINIC | Age: 68
End: 2023-08-14
Payer: MEDICARE

## 2023-08-14 DIAGNOSIS — L82.1 SEBORRHEIC KERATOSES: ICD-10-CM

## 2023-08-14 DIAGNOSIS — D22.72 MULTIPLE BENIGN NEVI OF LEFT LOWER EXTREMITY: ICD-10-CM

## 2023-08-14 DIAGNOSIS — D22.61 MULTIPLE BENIGN NEVI OF RIGHT UPPER EXTREMITY: ICD-10-CM

## 2023-08-14 DIAGNOSIS — D18.01 CHERRY HEMANGIOMA: ICD-10-CM

## 2023-08-14 DIAGNOSIS — B08.1 BATEMAN'S DISEASE: ICD-10-CM

## 2023-08-14 DIAGNOSIS — Z87.2 HISTORY OF ACTINIC KERATOSES: Primary | ICD-10-CM

## 2023-08-14 DIAGNOSIS — D22.62 MULTIPLE BENIGN NEVI OF LEFT UPPER EXTREMITY: ICD-10-CM

## 2023-08-14 DIAGNOSIS — L57.8 ACTINIC SKIN DAMAGE: ICD-10-CM

## 2023-08-14 DIAGNOSIS — D22.5 MULTIPLE BENIGN NEVI OF TORSO: ICD-10-CM

## 2023-08-14 DIAGNOSIS — L81.4 LENTIGINES: ICD-10-CM

## 2023-08-14 DIAGNOSIS — D22.71 MULTIPLE BENIGN NEVI OF RIGHT LOWER EXTREMITY: ICD-10-CM

## 2023-08-14 PROCEDURE — 99213 OFFICE O/P EST LOW 20 MIN: CPT | Performed by: DERMATOLOGY

## 2023-08-14 PROCEDURE — G0463 HOSPITAL OUTPT CLINIC VISIT: HCPCS | Mod: PO | Performed by: DERMATOLOGY

## 2023-08-14 ASSESSMENT — ENCOUNTER SYMPTOMS
FEVER: 0
FATIGUE: 0
BRUISES/BLEEDS EASILY: 1

## 2023-08-14 ASSESSMENT — PAIN SCALES - GENERAL: PAINLEVEL: 0-NO PAIN

## 2023-09-11 ENCOUNTER — TELEPHONE (OUTPATIENT)
Dept: FAMILY MEDICINE | Facility: CLINIC | Age: 68
End: 2023-09-11
Payer: MEDICARE

## 2023-09-11 DIAGNOSIS — I10 ESSENTIAL HYPERTENSION: ICD-10-CM

## 2023-09-11 RX ORDER — METOPROLOL SUCCINATE 25 MG/1
TABLET, EXTENDED RELEASE ORAL
Qty: 30 TABLET | Refills: 0 | Status: SHIPPED | OUTPATIENT
Start: 2023-09-11 | End: 2023-09-18 | Stop reason: SDUPTHER

## 2023-09-11 RX ORDER — METOPROLOL SUCCINATE 25 MG/1
TABLET, EXTENDED RELEASE ORAL
Qty: 30 TABLET | Refills: 0 | Status: SHIPPED | OUTPATIENT
Start: 2023-09-11 | End: 2023-09-11

## 2023-09-11 NOTE — TELEPHONE ENCOUNTER
Camilo was scheduled for an Annual Appt that I changed to NOAH.  He is wanting to know if he can get his labs done prior to the appt.    Please advise

## 2023-09-11 NOTE — TELEPHONE ENCOUNTER
Care Due:                  Date            Visit Type   Department     Provider  --------------------------------------------------------------------------------                              ESTABLISHED                              PATIENT -    [facility]  Last Visit: 09-      CCT          MEDICINE       [facility]                                           [facility]  Next Visit: 09-      PHYSICAL     MEDICINE       [facility]                                                            Last  Test          Frequency    Reason                     Performed    Due Date  --------------------------------------------------------------------------------  Lipid Panel.  12 months..  atorvastatin.............  Not Found    Overdue    [employer] Embedded Care Due Messages. Reference number: [id number]. 9/11/2023 4:13:58 AM WONG

## 2023-09-11 NOTE — TELEPHONE ENCOUNTER
No care due was identified.  [facility] Embedded Care Due Messages. Reference number: [id number]. 9/11/2023 12:22:47 PM MDT

## 2023-09-18 ENCOUNTER — OFFICE VISIT (OUTPATIENT)
Dept: FAMILY MEDICINE | Facility: CLINIC | Age: 68
End: 2023-09-18
Payer: MEDICARE

## 2023-09-18 VITALS
OXYGEN SATURATION: 95 % | HEIGHT: 76 IN | DIASTOLIC BLOOD PRESSURE: 74 MMHG | WEIGHT: 230.5 LBS | HEART RATE: 84 BPM | BODY MASS INDEX: 28.07 KG/M2 | SYSTOLIC BLOOD PRESSURE: 130 MMHG | RESPIRATION RATE: 20 BRPM | TEMPERATURE: 98.6 F

## 2023-09-18 DIAGNOSIS — E78.5 HYPERLIPIDEMIA LDL GOAL <100: ICD-10-CM

## 2023-09-18 DIAGNOSIS — Z12.5 SCREENING FOR MALIGNANT NEOPLASM OF PROSTATE: ICD-10-CM

## 2023-09-18 DIAGNOSIS — Z23 IMMUNIZATION DUE: ICD-10-CM

## 2023-09-18 DIAGNOSIS — Z00.00 ENCOUNTER FOR MEDICARE ANNUAL WELLNESS EXAM: Primary | ICD-10-CM

## 2023-09-18 DIAGNOSIS — I10 ESSENTIAL HYPERTENSION: ICD-10-CM

## 2023-09-18 PROBLEM — Z01.818 PRE-OP EXAM: Status: RESOLVED | Noted: 2021-09-15 | Resolved: 2023-09-18

## 2023-09-18 PROBLEM — Z12.11 COLON CANCER SCREENING: Status: RESOLVED | Noted: 2023-07-18 | Resolved: 2023-09-18

## 2023-09-18 LAB
ALBUMIN SERPL-MCNC: 4.2 G/DL (ref 3.5–5.3)
ALP SERPL-CCNC: 62 U/L (ref 45–115)
ALT SERPL-CCNC: 42 U/L (ref 7–52)
ANION GAP SERPL CALC-SCNC: 7 MMOL/L (ref 3–11)
AST SERPL-CCNC: 34 U/L
BASOPHILS # BLD AUTO: 0 10*3/UL
BASOPHILS NFR BLD AUTO: 0.5 % (ref 0–2)
BILIRUB SERPL-MCNC: 0.7 MG/DL (ref 0.2–1.4)
BUN SERPL-MCNC: 22 MG/DL (ref 7–25)
CALCIUM ALBUM COR SERPL-MCNC: 9.3 MG/DL (ref 8.6–10.3)
CALCIUM SERPL-MCNC: 9.5 MG/DL (ref 8.6–10.3)
CHLORIDE SERPL-SCNC: 105 MMOL/L (ref 98–107)
CHOLEST SERPL-MCNC: 162 MG/DL (ref 0–199)
CO2 SERPL-SCNC: 25 MMOL/L (ref 21–32)
CREAT SERPL-MCNC: 0.87 MG/DL (ref 0.7–1.3)
EGFRCR SERPLBLD CKD-EPI 2021: 95 ML/MIN/1.73M*2
EOSINOPHIL # BLD AUTO: 0.1 10*3/UL
EOSINOPHIL NFR BLD AUTO: 1.5 % (ref 0–3)
ERYTHROCYTE [DISTWIDTH] IN BLOOD BY AUTOMATED COUNT: 14.1 % (ref 11.5–15)
FASTING STATUS PATIENT QL REPORTED: NO
GLUCOSE SERPL-MCNC: 91 MG/DL (ref 70–105)
HCT VFR BLD AUTO: 44.5 % (ref 38–50)
HDLC SERPL-MCNC: 63 MG/DL
HGB BLD-MCNC: 15.1 G/DL (ref 13.2–17.2)
LDLC SERPL CALC-MCNC: 72 MG/DL (ref 20–99)
LYMPHOCYTES # BLD AUTO: 1.4 10*3/UL
LYMPHOCYTES NFR BLD AUTO: 26.9 % (ref 15–47)
MCH RBC QN AUTO: 32.7 PG (ref 29–34)
MCHC RBC AUTO-ENTMCNC: 34 G/DL (ref 32–36)
MCV RBC AUTO: 96.1 FL (ref 82–97)
MONOCYTES # BLD AUTO: 0.6 10*3/UL
MONOCYTES NFR BLD AUTO: 12.3 % (ref 5–13)
NEUTROPHILS # BLD AUTO: 3 10*3/UL
NEUTROPHILS NFR BLD AUTO: 58.8 % (ref 46–70)
PLATELET # BLD AUTO: 218 10*3/UL (ref 130–350)
PMV BLD AUTO: 6.6 FL (ref 6.9–10.8)
POTASSIUM SERPL-SCNC: 4.1 MMOL/L (ref 3.5–5.1)
PROT SERPL-MCNC: 6.8 G/DL (ref 6–8.3)
PSA SERPL-MCNC: 1.66 NG/ML (ref 0–4)
RBC # BLD AUTO: 4.63 10*6/ΜL (ref 4.1–5.8)
SODIUM SERPL-SCNC: 137 MMOL/L (ref 135–145)
TRIGL SERPL-MCNC: 136 MG/DL
WBC # BLD AUTO: 5.1 10*3/UL (ref 3.7–9.6)

## 2023-09-18 PROCEDURE — 80061 LIPID PANEL: CPT | Mod: NCP | Performed by: FAMILY MEDICINE

## 2023-09-18 PROCEDURE — 99214 OFFICE O/P EST MOD 30 MIN: CPT | Performed by: FAMILY MEDICINE

## 2023-09-18 PROCEDURE — 90750 HZV VACC RECOMBINANT IM: CPT | Performed by: FAMILY MEDICINE

## 2023-09-18 PROCEDURE — 84153 ASSAY OF PSA TOTAL: CPT | Performed by: FAMILY MEDICINE

## 2023-09-18 PROCEDURE — G0439 PPPS, SUBSEQ VISIT: HCPCS | Performed by: FAMILY MEDICINE

## 2023-09-18 PROCEDURE — G0463 HOSPITAL OUTPT CLINIC VISIT: HCPCS

## 2023-09-18 PROCEDURE — 36415 COLL VENOUS BLD VENIPUNCTURE: CPT | Performed by: FAMILY MEDICINE

## 2023-09-18 PROCEDURE — 85025 COMPLETE CBC W/AUTO DIFF WBC: CPT | Performed by: FAMILY MEDICINE

## 2023-09-18 PROCEDURE — 90471 IMMUNIZATION ADMIN: CPT

## 2023-09-18 PROCEDURE — 90471 IMMUNIZATION ADMIN: CPT | Performed by: FAMILY MEDICINE

## 2023-09-18 PROCEDURE — 80053 COMPREHEN METABOLIC PANEL: CPT | Performed by: FAMILY MEDICINE

## 2023-09-18 RX ORDER — ATORVASTATIN CALCIUM 40 MG/1
40 TABLET, FILM COATED ORAL DAILY
Qty: 90 TABLET | Refills: 3 | Status: SHIPPED | OUTPATIENT
Start: 2023-09-18 | End: 2024-09-19 | Stop reason: SDUPTHER

## 2023-09-18 RX ORDER — METOPROLOL SUCCINATE 25 MG/1
25 TABLET, EXTENDED RELEASE ORAL DAILY
Qty: 90 TABLET | Refills: 3 | Status: SHIPPED | OUTPATIENT
Start: 2023-09-18 | End: 2024-10-07 | Stop reason: SDUPTHER

## 2023-09-18 ASSESSMENT — PAIN SCALES - GENERAL: PAINLEVEL: 0-NO PAIN

## 2023-09-18 ASSESSMENT — ENCOUNTER SYMPTOMS
FEVER: 0
NAUSEA: 0
SHORTNESS OF BREATH: 0
VOMITING: 0
CHILLS: 0
COUGH: 0
PALPITATIONS: 0

## 2023-09-18 ASSESSMENT — PATIENT HEALTH QUESTIONNAIRE - PHQ9
2. FEELING DOWN, DEPRESSED OR HOPELESS: NOT AT ALL
1. LITTLE INTEREST OR PLEASURE IN DOING THINGS: NOT AT ALL
SUM OF ALL RESPONSES TO PHQ9 QUESTIONS 1 & 2: 0

## 2023-09-18 NOTE — PROGRESS NOTES
"Blaise Elaine  1955    Subjective     Chief Complaint   Patient presents with    medicare wellness    Med Refill       HPI:  Blaise Elaine is a 68 y.o. male who presents for Medicare Wellness Visit.  He consents to the use of KARLY.     The patient had shingles vaccine previously, and he is due for another dose.    He is on metoprolol and atorvastatin. His refills are usually a 90-day supply. His blood pressure is well controlled.     The patient has been receiving his flu shot every year, and he is amenable to receiving his due dose today.    He does not have any other concerns aside from having his medication refilled. He is obtaining his refills at [facility] in [place].         The following were reviewed and discussed and updated as appropriate:        Review of Systems:  Review of Systems   Constitutional:  Negative for chills and fever.   Eyes:  Negative for visual disturbance.   Respiratory:  Negative for cough and shortness of breath.    Cardiovascular:  Negative for chest pain and palpitations.   Gastrointestinal:  Negative for nausea and vomiting.   All other systems reviewed and are negative.    Objective   EXAM:  /74 (BP Location: Left arm, Patient Position: Sitting, Cuff Size: Long Adult)   Pulse 84   Temp 37 °C (98.6 °F) (Oral)   Resp 20   Ht 1.93 m (6' 4\")   Wt 104.6 kg (230 lb 8 oz)   SpO2 95%   BMI 28.06 kg/m²   If Vision screening done it is documented in the chart reviewed and discussed with patient.    High-risk medications being taken:None     Any opioid prescriptions? No    Physical Exam  Constitutional:       Appearance: Normal appearance.   HENT:      Head: Normocephalic and atraumatic.      Right Ear: External ear normal.      Left Ear: External ear normal.      Nose: Nose normal.      Mouth/Throat:      Mouth: Mucous membranes are moist.      Pharynx: Oropharynx is clear.   Eyes:      Extraocular Movements: Extraocular movements intact.      " Conjunctiva/sclera: Conjunctivae normal.   Pulmonary:      Effort: Pulmonary effort is normal.   Musculoskeletal:      Cervical back: Normal range of motion.   Skin:     General: Skin is dry.   Neurological:      General: No focal deficit present.      Mental Status: He is alert and oriented to person, place, and time.   Psychiatric:         Mood and Affect: Mood normal.         Behavior: Behavior normal.         Medicare Checklist  All Screenings done by nursing reviewed and discussed with the patient.    For IPPE a vision screening was done.  No results found.     Depression/mood--  PHQ2-9 Depression Screening  Over the past 2 weeks, how often have you been bothered by any of the following problems?  Little interest or pleasure in doing things: Not at all  Feeling down, depressed, or hopeless: Not at all  Patient Health Questionnaire-2 Score: 0    Functional ability/safety:     1. Was the patient to ambulate independently with normal gait and stability?  yes  2. Do you need help with the phone, transportation, shopping, preparing meals housework, laundry, medications, or managing money?   no  3. Does your home have rugs in the hallway, lack grab bars in the bathroom, lack handrails on the stairs, or have poor lighting?   no  4. Have you noticed any hearing difficulties?   no    --Care team reviewed and updated in the chart.     --Advanced directives may have been discussed with the patient--see problem oriented charting notes    --Educational handouts for Health Maintenance and Preventive Care were given to the patient as part of the after visit summary.    --Preventing falls at home discussed.  If a therapy need was identified it is listed in the problem-oriented charting    --Reviewed lifestyle measures such as fitness activities and food choices and adequate sleep to help prevent chronic disease.    --If patient uses tobacco or other substances harmful to health we had discussion regarding reducing or  eliminating the use.  See problem-oriented charting    --Educational handouts for Health Maintenance and Preventive Care were given to the patient as part of the after visit summary.  --After visit summary includes a list of preventive services ordered and recommendations for self-care.    I reviewed the required histories and the patient care team    Care team updated    Past Medical History:   Diagnosis Date    Hypertension 2016     Family History   Problem Relation Age of Onset    Valvular heart disease Father     Prostate cancer Father     Skin cancer Father     Colon cancer Brother 45        living    Skin cancer Mother      Past Surgical History:   Procedure Laterality Date    COLONOSCOPY N/A 8/3/2023    Procedure: SCREENING COLONOSCOPY with Polypectomies;  Surgeon: Elan Loja MD;  Location: [facility];  Service: Endoscopy;  Laterality: N/A;    HERNIA REPAIR       Social History     Socioeconomic History    Marital status:      Spouse name: Not on file    Number of children: Not on file    Years of education: Not on file    Highest education level: Not on file   Occupational History    Not on file   Tobacco Use    Smoking status: Former     Packs/day: 1.00     Years: 5.00     Additional pack years: 0.00     Total pack years: 5.00     Types: Cigarettes     Quit date: 1981     Years since quittin.1    Smokeless tobacco: Never   Vaping Use    Vaping Use: Never used   Substance and Sexual Activity    Alcohol use: Yes     Alcohol/week: 14.0 standard drinks of alcohol     Types: 14 Shots of liquor per week     Comment: 2 to 3 drinks before dinner    Drug use: Not Currently    Sexual activity: Not on file   Other Topics Concern    Not on file   Social History Narrative    Not on file     Social Determinants of Health     Financial Resource Strain: Not on file   Food Insecurity: Not on file   Transportation Needs: Not on file   Physical Activity: Not on file   Stress: Not on file   Social  Connections: Not on file   Intimate Partner Violence: Not on file   Housing Stability: Not on file        Assessment/Plan   Assessment/Plan/Discussion:  No problem-specific Assessment & Plan notes found for this encounter.    1. Encounter for Medicare wellness exam.  We will complete his health maintenance items today including shingles vaccine, influenza vaccine, and basic blood work. Other items of Medicare wellness visit completed and documented in note above.     2. Hyperlipidemia.  The patient is currently on atorvastatin 40 mg daily. Refills provided.    3. Essential hypertension.   The patient's blood pressure is under good control. He is currently on metoprolol for hypertension. Refills provided.    4. Prostate cancer screening.   PSA ordered for screening.     Follow up in 1 year or sooner if needed.     ORDERS:  Diagnoses and all orders for this visit:    Encounter for Medicare annual wellness exam    Hyperlipidemia LDL goal <100  -     atorvastatin (LIPITOR) 40 mg tablet; Take 1 tablet (40 mg total) by mouth daily  -     Lipid panel Blood, Venous; Future  -     CBC w/auto differential Blood, Venous; Future  -     Comprehensive metabolic panel Blood, Venous; Future    Essential hypertension  -     metoprolol succinate XL (TOPROL-XL) 25 mg 24 hr tablet; Take 1 tablet (25 mg total) by mouth daily    Immunization due  -     Varicella-zoster vaccine (Shingrix) IM; Future  -     Varicella-zoster vaccine (Shingrix) IM  -     Flu Vaccine Adjuvanted (PF) greater than or equal to 65 years old IM    Screening for malignant neoplasm of prostate  -     PSA screen Blood, Venous; Future         Follow Up from this visit:  No follow-ups on file.    Patient Instructions  There are no Patient Instructions on file for this visit.    Electronically signed by: Vega Jordan DO  9/18/2023  5:27 PM    ATTESTATION:    Draft generated by Rudy BRANDT and edited by Rebeca Meyer, Quality  on 09/18/2023.

## 2023-10-01 ENCOUNTER — HEALTH MAINTENANCE LETTER (OUTPATIENT)
Age: 68
End: 2023-10-01

## 2023-11-21 ENCOUNTER — CLINICAL SUPPORT (OUTPATIENT)
Dept: FAMILY MEDICINE | Facility: CLINIC | Age: 68
End: 2023-11-21
Payer: MEDICARE

## 2023-11-21 DIAGNOSIS — Z23 IMMUNIZATION DUE: Primary | ICD-10-CM

## 2023-11-21 PROCEDURE — 90471 IMMUNIZATION ADMIN: CPT | Performed by: FAMILY MEDICINE

## 2023-11-21 PROCEDURE — 90750 HZV VACC RECOMBINANT IM: CPT | Performed by: FAMILY MEDICINE

## 2024-04-30 ENCOUNTER — TELEPHONE (OUTPATIENT)
Dept: FAMILY MEDICINE | Facility: CLINIC | Age: 69
End: 2024-04-30
Payer: MEDICARE

## 2024-04-30 DIAGNOSIS — B00.1 RECURRENT HERPES LABIALIS: Primary | ICD-10-CM

## 2024-04-30 RX ORDER — VALACYCLOVIR HYDROCHLORIDE 1 G/1
1000 TABLET, FILM COATED ORAL 2 TIMES DAILY
Qty: 14 TABLET | Refills: 0 | Status: SHIPPED | OUTPATIENT
Start: 2024-04-30 | End: 2024-10-07 | Stop reason: ALTCHOICE

## 2024-04-30 NOTE — TELEPHONE ENCOUNTER
The patient needs refill on the following  valACYclovir (VALTREX) 1 gram tablet  Please send to [facility] on [facility] please advise

## 2024-06-03 ENCOUNTER — OFFICE VISIT (OUTPATIENT)
Dept: URGENT CARE | Facility: CLINIC | Age: 69
End: 2024-06-03
Payer: MEDICARE

## 2024-06-03 VITALS
SYSTOLIC BLOOD PRESSURE: 132 MMHG | HEART RATE: 81 BPM | RESPIRATION RATE: 18 BRPM | TEMPERATURE: 97.6 F | OXYGEN SATURATION: 97 % | DIASTOLIC BLOOD PRESSURE: 72 MMHG

## 2024-06-03 DIAGNOSIS — R30.0 DYSURIA: Primary | ICD-10-CM

## 2024-06-03 DIAGNOSIS — R01.1 HEART MURMUR: ICD-10-CM

## 2024-06-03 DIAGNOSIS — N30.91 CYSTITIS WITH HEMATURIA: ICD-10-CM

## 2024-06-03 LAB
BACTERIA #/AREA URNS HPF: ABNORMAL /HPF
BILIRUB UR QL: NEGATIVE
CLARITY UR: CLEAR
COLOR UR: YELLOW
GLUCOSE UR QL: NEGATIVE MG/DL
HGB UR QL: ABNORMAL
KETONES UR-MCNC: NEGATIVE MG/DL
LEUKOCYTE ESTERASE UR QL STRIP: NEGATIVE
MUCOUS THREADS #/AREA URNS HPF: PRESENT /[HPF]
NITRITE UR QL: NEGATIVE
PH UR: 7 PH
PROT UR STRIP-MCNC: NEGATIVE MG/DL
RBC #/AREA URNS HPF: ABNORMAL /HPF
SP GR UR: 1.01 (ref 1–1.03)
SQUAMOUS #/AREA URNS HPF: ABNORMAL /HPF
UROBILINOGEN UR-MCNC: 0.2 MG/DL
WBC #/AREA URNS HPF: ABNORMAL /HPF

## 2024-06-03 PROCEDURE — 81001 URINALYSIS AUTO W/SCOPE: CPT | Performed by: FAMILY MEDICINE

## 2024-06-03 PROCEDURE — G0463 HOSPITAL OUTPT CLINIC VISIT: HCPCS

## 2024-06-03 PROCEDURE — 99213 OFFICE O/P EST LOW 20 MIN: CPT | Performed by: FAMILY MEDICINE

## 2024-06-03 RX ORDER — KETOROLAC TROMETHAMINE 5 MG/ML
SOLUTION OPHTHALMIC
COMMUNITY
Start: 2024-05-14 | End: 2024-10-07 | Stop reason: ALTCHOICE

## 2024-06-03 RX ORDER — CIPROFLOXACIN 500 MG/1
500 TABLET ORAL 2 TIMES DAILY
Qty: 14 TABLET | Refills: 0 | Status: SHIPPED | OUTPATIENT
Start: 2024-06-03 | End: 2024-06-10

## 2024-06-03 ASSESSMENT — ENCOUNTER SYMPTOMS
UNEXPECTED WEIGHT CHANGE: 0
DIFFICULTY URINATING: 0
CONSTIPATION: 0
DYSURIA: 1
SHORTNESS OF BREATH: 0
DYSPHORIC MOOD: 0
CHILLS: 0
WEAKNESS: 0
DIARRHEA: 0
NERVOUS/ANXIOUS: 0
BLOOD IN STOOL: 0
SLEEP DISTURBANCE: 0
NUMBNESS: 0
ACTIVITY CHANGE: 0
PALPITATIONS: 0
FEVER: 0

## 2024-06-03 NOTE — PROGRESS NOTES
IMPRESSION AND PLAN  Diagnoses and all orders for this visit:    Dysuria  -     Urinalysis w/microscopic, reflex culture Urine, Clean Catch; Future    Heart murmur  -     US Echo complete; Future    Cystitis with hematuria  -     ciprofloxacin (Cipro) 500 mg tablet; Take 1 tablet (500 mg total) by mouth 2 (two) times a day for 7 days    Treat with ciprofloxacin 500 mg BID x 7 days. We will await culture. This does not appear to be classic symptoms of a kidney stone but if lack of resolution would consider CT. Follow up hematuria with PCP.     I will have Camilo do an echo for findings of new heart murmur.    HPI  Chief complaint for visit per nursing.       Chief Complaint   Patient presents with    Burning with urination     UTI symptoms x 1 week.     Camilo is a pleasant 69 yo who presents to discuss dysuria for the last week. He noticed hematuria and dysuria 6 weeks ago and took 12 days of amoxicillin he had at home. That helped him and symptoms seemed to resolve until last week. He has no fevers or back pain. Denies any hematuria. He is eating and drinking well. No hx of kidney stones. He had a UTI 30 years ago he guesses.  No history of a heart murmur. His parent had a replaced valve but he cannot remember which valve. No shortness of breath, edema, chest pain.    PROBLEM LIST  Patient Active Problem List   Diagnosis    Hyperlipidemia LDL goal <100    Essential hypertension    Family history of colon cancer    Screening for deficiency anemia    Need for hepatitis C screening test    IFG (impaired fasting glucose)    Metabolic syndrome X         SOCIAL HX  Social History     Socioeconomic History    Marital status:      Spouse name: Not on file    Number of children: Not on file    Years of education: Not on file    Highest education level: Not on file   Occupational History    Not on file   Tobacco Use    Smoking status: Former     Current packs/day: 0.00     Average packs/day: 1 pack/day for 5.0 years (5.0  ttl pk-yrs)     Types: Cigarettes     Start date: 1976     Quit date: 1981     Years since quittin.8    Smokeless tobacco: Never   Vaping Use    Vaping status: Never Used   Substance and Sexual Activity    Alcohol use: Yes     Alcohol/week: 14.0 standard drinks of alcohol     Types: 14 Shots of liquor per week     Comment: 2 to 3 drinks before dinner    Drug use: Not Currently    Sexual activity: Not on file   Other Topics Concern    Not on file   Social History Narrative    Not on file     Social Determinants of Health     Financial Resource Strain: Not on file   Food Insecurity: Not on file   Transportation Needs: Not on file   Physical Activity: Not on file   Stress: Not on file   Social Connections: Not on file   Intimate Partner Violence: Not on file   Housing Stability: Not on file       FAMILY HX  Family History   Problem Relation Age of Onset    Valvular heart disease Father     Prostate cancer Father     Skin cancer Father     Colon cancer Brother 45        living    Skin cancer Mother        ROS  Review of Systems   Constitutional:  Negative for activity change, chills, fever and unexpected weight change.   Respiratory:  Negative for shortness of breath.    Cardiovascular:  Negative for chest pain and palpitations.   Gastrointestinal:  Negative for blood in stool, constipation and diarrhea.   Genitourinary:  Positive for dysuria. Negative for difficulty urinating.   Neurological:  Negative for weakness and numbness.   Psychiatric/Behavioral:  Negative for dysphoric mood and sleep disturbance. The patient is not nervous/anxious.       12 point review of systems performed is negative unless otherwise mentioned per HPI.    Physical Exam  /72 (BP Location: Left arm, Patient Position: Sitting, Cuff Size: Large Adult)   Pulse 81   Temp 36.4 °C (97.6 °F)   Resp 18   SpO2 97%   BP Readings from Last 3 Encounters:   24 132/72   23 130/74   23 140/81       Physical  "Exam  Vitals and nursing note reviewed.   Constitutional:       Appearance: Normal appearance.   HENT:      Head: Atraumatic.      Mouth/Throat:      Mouth: Mucous membranes are moist.   Eyes:      Extraocular Movements: Extraocular movements intact.      Conjunctiva/sclera: Conjunctivae normal.   Cardiovascular:      Rate and Rhythm: Normal rate and regular rhythm.      Heart sounds: Murmur (holsystolic) heard.   Pulmonary:      Effort: Pulmonary effort is normal. No respiratory distress.      Breath sounds: Normal breath sounds.   Abdominal:      General: Abdomen is flat. There is no distension.      Palpations: Abdomen is soft.      Tenderness: There is no abdominal tenderness.   Musculoskeletal:      Right lower leg: No edema.      Left lower leg: No edema.   Lymphadenopathy:      Cervical: No cervical adenopathy.   Skin:     General: Skin is warm and dry.   Neurological:      General: No focal deficit present.      Mental Status: He is alert.            LABS AND XRAYS  Lab Results   Component Value Date    GLUCOSE 91 09/18/2023    CALCIUM 9.5 09/18/2023     09/18/2023    K 4.1 09/18/2023    CO2 25 09/18/2023     09/18/2023    BUN 22 09/18/2023    CREATININE 0.87 09/18/2023    ANIONGAP 7 09/18/2023     Lab Results   Component Value Date    WBC 5.1 09/18/2023    HGB 15.1 09/18/2023    HCT 44.5 09/18/2023    MCV 96.1 09/18/2023     09/18/2023     No results found for: \"TSH\"  No results found for: \"HGBA1C\"  Lab Results   Component Value Date    LDLCALC 72 09/18/2023    CREATININE 0.87 09/18/2023     No results found for: \"SEDRATE\"  No results found for: \"URACSRM\"  No results found for: \"AMYLASE\"  No results found for: \"LIPASE\"      No results found.    MEDICATIONS    Current Outpatient Medications:     ketorolac (ACULAR) 0.5 % ophthalmic solution, SMARTSIG:In Eye(s), Disp: , Rfl:     valACYclovir (VALTREX) 1 gram tablet, Take 1 tablet (1,000 mg total) by mouth 2 (two) times a day for 7 days, " Disp: 14 tablet, Rfl: 0    atorvastatin (LIPITOR) 40 mg tablet, Take 1 tablet (40 mg total) by mouth daily, Disp: 90 tablet, Rfl: 3    metoprolol succinate XL (TOPROL-XL) 25 mg 24 hr tablet, Take 1 tablet (25 mg total) by mouth daily, Disp: 90 tablet, Rfl: 3    glucosamine HCl/chondroitin schmidt (GLUCOSAMINE-CHONDROITIN ORAL), Take by mouth daily, Disp: , Rfl:     multivitamin with minerals tablet, Take 1 tablet by mouth daily, Disp: , Rfl:     ciprofloxacin (Cipro) 500 mg tablet, Take 1 tablet (500 mg total) by mouth 2 (two) times a day for 7 days, Disp: 14 tablet, Rfl: 0      SEE ABOVE FOR IMPRESSION AND PLAN    Stacey Weathers MD  06/07/24  9:21 AM

## 2024-06-12 ENCOUNTER — TELEPHONE (OUTPATIENT)
Dept: FAMILY MEDICINE | Facility: CLINIC | Age: 69
End: 2024-06-12
Payer: MEDICARE

## 2024-06-12 DIAGNOSIS — R31.9 HEMATURIA, UNSPECIFIED TYPE: Primary | ICD-10-CM

## 2024-06-12 NOTE — TELEPHONE ENCOUNTER
Spoke with patient who states he is doing much better. He thinks he past some stones. He is aware to f/u with urine sample in approx 3 weeks. Placed order under Dr. Jordan.         Stacey Sepulveda MD sent to Catherine Banks RN  Can you let him know that it didn't get sent for cx for some reason and ask him if he is feeling better? He should have a recheck of his urine in 3 weeks to make sure there is no more blood in his urine.

## 2024-06-13 ENCOUNTER — HOSPITAL ENCOUNTER (OUTPATIENT)
Dept: ULTRASOUND IMAGING | Facility: HOSPITAL | Age: 69
Discharge: HOME | End: 2024-06-13
Payer: MEDICARE

## 2024-06-13 VITALS — DIASTOLIC BLOOD PRESSURE: 80 MMHG | SYSTOLIC BLOOD PRESSURE: 162 MMHG

## 2024-06-13 DIAGNOSIS — R01.1 HEART MURMUR: ICD-10-CM

## 2024-06-13 LAB
AR MAX PG: 51 MMHG
AR SLOPE: 1890 MM/S2
ASCENDING AORTA: 3.7 CM
AV LVOT PEAK GRADIENT: 3.8 MMHG
AV MEAN GRADIENT: 4.02 MMHG
AV PEAK GRADIENT: 7.62 MMHG
AV REGURGITATION PRESSURE HALF TIME: 555 MS
DOP CALC AO PEAK VEL: 1.38 M/S
DOP CALC AO VTI: 30.3 CM
DOP CALC LVOT DIAMETER: 2.09 CM
DOP CALC LVOT STROKE VOLUME: 66 CM3
DOP CALC MV VTI: 24.79 CM
DOP CALC RVOT PEAK VEL: 0.5 M/S
DOP CALCLVOT PEAK VEL VTI: 19.1 CM
E/A RATIO: 1
E/E' RATIO (AVERAGE): 9.5
E/E' RATIO: 10.8
EJECTION FRACTION: 61 %
INTERVENTRICULAR SEPTUM: 1 CM (ref 0.6–1.1)
IVC PROX: 1.31 CM
LA AREA A4C SYSTOLE: 30.5 CM3
LEFT ATRIUM SIZE: 3.49 CM
LEFT ATRIUM VOLUME INDEX: 21 ML/M2
LEFT ATRIUM VOLUME: 49 CM3
LEFT INTERNAL DIMENSION IN SYSTOLE: 3.1 CM (ref 2.1–4)
LEFT VENTRICLE DIASTOLIC VOLUME: 126 CM3
LEFT VENTRICLE SYSTOLIC VOLUME: 50 CM3
LEFT VENTRICULAR INTERNAL DIMENSION IN DIASTOLE: 4.6 CM (ref 3.5–6)
LVAD-AP2: 40.6 CM2
MV DEC SLOPE: 3820 MM/S2
MV DT: 224 MS
MV MEAN GRADIENT: 2.1 MMHG
MV PEAK A VEL: 87 CM/S
MV PEAK E VEL: 83.6 CM/S
MV PEAK GRADIENT: 4 MMHG
MV STENOSIS PRESSURE HALF TIME: 80 MS
MV VALVE AREA P 1/2 METHOD: 2.75 CM2
MV VMAX: 106 CM/S
MVA (VTI): 2.64 CM2
PADP: 6.97 MMHG
PISA AR MAX VEL: 358 CM/S
POSTERIOR WALL: 1 CM (ref 0.6–1.1)
PR END VMAX: 132 CM/S
PULM VEIN S/D RATIO: 1.5
PV PEAK D VEL: 50 CM/S
PV PEAK GRADIENT: 2.76 MMHG
PV PEAK S VEL: 74 CM/S
PV VMAX: 0.83 M/S
RA AREA: 14.8 CM2
RH ABDOMINAL AORTA: 2.13 CM
RH CV ECHO AV VALVE AREA VEL: 2.4 CM2
RH CV ECHO AV VALVE AREA VTI: 2.2 CM2
RH LVOT PEAK VELOCITY REST: 1 M/S
RIGHT VENTRICULAR INTERNAL DIMENSION IN DIASTOLE: 4.1 CM
RV AP4 BASE: 3.8 CM
S': 12 CM/S
TDI: 7.7 CM/S
TDILATERAL: 10.2 CM/S
TRICUSPID ANNULAR PLANE SYSTOLIC EXCURSION: 2.1 CM

## 2024-06-13 PROCEDURE — 93306 TTE W/DOPPLER COMPLETE: CPT

## 2024-06-13 PROCEDURE — 93306 TTE W/DOPPLER COMPLETE: CPT | Mod: 26 | Performed by: INTERNAL MEDICINE

## 2024-07-02 ENCOUNTER — LAB (OUTPATIENT)
Dept: FAMILY MEDICINE | Facility: CLINIC | Age: 69
End: 2024-07-02
Payer: MEDICARE

## 2024-07-02 DIAGNOSIS — R31.9 HEMATURIA, UNSPECIFIED TYPE: ICD-10-CM

## 2024-07-02 LAB
BACTERIA #/AREA URNS HPF: NORMAL /HPF
BILIRUB UR QL: NEGATIVE
CLARITY UR: CLEAR
COLOR UR: ABNORMAL
GLUCOSE UR QL: NEGATIVE MG/DL
HGB UR QL: ABNORMAL
HYALINE CASTS #/AREA URNS AUTO: NORMAL /LPF
KETONES UR-MCNC: ABNORMAL MG/DL
LEUKOCYTE ESTERASE UR QL STRIP: NEGATIVE
MUCOUS THREADS #/AREA URNS HPF: PRESENT /[HPF]
NITRITE UR QL: NEGATIVE
PH UR: 7 PH
PROT UR STRIP-MCNC: ABNORMAL MG/DL
RBC #/AREA URNS HPF: NORMAL /HPF
SP GR UR: 1.02 (ref 1–1.03)
SQUAMOUS #/AREA URNS HPF: NORMAL /HPF
UROBILINOGEN UR-MCNC: 0.2 MG/DL
WBC #/AREA URNS HPF: NORMAL /HPF

## 2024-07-02 PROCEDURE — 81001 URINALYSIS AUTO W/SCOPE: CPT

## 2024-07-15 NOTE — PROGRESS NOTES
Subjective   Return Dermatology Skin examination  Blaise Elaine is a 69 y.o. male with a history of Actinic Keratoses who presents for a full body skin exam . Lesions of concern include: chest.      Review of Systems   Constitutional:  Negative for fatigue and fever.   Skin:  Negative for rash.   Allergic/Immunologic: Negative for environmental allergies and immunocompromised state.   Hematological:  Does not bruise/bleed easily.   All other systems reviewed and are negative.    Past Medical History:   Diagnosis Date    Hypertension 2016     Current Outpatient Medications on File Prior to Visit   Medication Sig Dispense Refill    ketorolac (ACULAR) 0.5 % ophthalmic solution SMARTSIG:In Eye(s)      valACYclovir (VALTREX) 1 gram tablet Take 1 tablet (1,000 mg total) by mouth 2 (two) times a day for 7 days 14 tablet 0    atorvastatin (LIPITOR) 40 mg tablet Take 1 tablet (40 mg total) by mouth daily 90 tablet 3    metoprolol succinate XL (TOPROL-XL) 25 mg 24 hr tablet Take 1 tablet (25 mg total) by mouth daily 90 tablet 3    glucosamine HCl/chondroitin schmidt (GLUCOSAMINE-CHONDROITIN ORAL) Take by mouth daily      multivitamin with minerals tablet Take 1 tablet by mouth daily       No current facility-administered medications on file prior to visit.       Allergies  Morphine    The following have been reviewed and updated as appropriate in this visit          Physical Examination  Vital Signs:  temporal temperature is 36.5 °C (97.7 °F). His blood pressure is 150/78 and his pulse is 71. His respiration is 16 and oxygen saturation is 96%.     Physical Exam Findings:   Constitutional: General Appearance: healthy-appearing, well-nourished, and well-developed. Level of Distress: NAD. Ambulation: ambulating normally.  Psychiatric: Insight: good judgement. Mental Status: normal mood and affect and active and alert. Orientation: to time, place, and person. Memory: recent memory normal and remote memory normal.  Head:  normocephalic and atraumatic.  Eyes: Lids and Conjunctivae: no discharge or pallor and non-injected. Lens: clear. Sclerae: non-icteric.  Neurologic: Gait and Station: normal gait and station. Cranial Nerves: grossly intact. Coordination and Cerebellum: no tremor.    A full body skin examination was performed including scalp, face, lips, mouth, ears, neck, both arms, chest, back, abdomen, buttocks, both legs, both feet.    Gan skin type:I    Exam: Stuck on tan brown papules, well demarcated tan brown macules, bright red cherry papules, pink papules with regular borders and pigmentation.  Golf ball to tennis ball size subcutaneous mobile nodule to central back-declined to schedule WLE at this time. Subcutaneous nodule with overlying punctum to right chest.     3 mm irregular brown macule to Left lateral Jawline MKG24/0846    2 mm slate grey macule central posterior frontal scalp MKG24/0847    Wearing socks: no  Wearing underwear: yes  Toenails are polished upon exam today: no  Fingernails are polished upon exam today: no  Makeup on exam today: no   Has a beard: no    Procedure Note:  Destruction of Benign or Premalignant Lesion    Date/Time: 8/12/2024 11:06 AM    Performed by: Lanie Navarro DO      Consent  Verbal consent was obtained from the patient. Written consent was not obtained from the patient. Risks include permanent scarring, light or dark discoloration, infection, pain, bleeding, bruising, redness, blister formation and recurrence of the lesion. Consent given by patient. The patient states understanding of the procedure being performed. Patient ID confirmed verbally with patient.     Location:  2 total lesions removed     Head/neck location(s): scalp (crown X 2)  Number of head/neck lesions removed: 2                      Procedure Details   Lesion(s) are pre-malignant.   Area(s) treated with alcohol 70%.  Local anesthesia was not used.   Lesion(s) destroyed with: liquid nitrogen. Liquid nitrogen  application completed 1 times. Lesion(s) were frozen until an ice ball extended beyond the lesion.     Post-Procedure  Patient tolerated procedure well with no complications.     Procedure Comments  Actinic keratosis       Lesion Biopsy    Date/Time: 8/12/2024 11:06 AM    Performed by: Lanie Navarro DO    Consent    Verbal consent was obtained from the patient. Written consent was not obtained from the patient. Risks, benefits, and alternatives were discussed. Consent given by patient. The patient states understanding of the procedure being performed. Patient ID confirmed verbally with patient.         Biopsy 1    Location Details  Body area: head/neck  Site: chin (Left lateral Jawline MKG24/0846)  Lesion size: 0.3 cm.     Preparation Details  Adequate anesthesia is achieved using 1 mL of lidocaine 1% with epinephrine (administered by Guy Santiago LPN) in a local infiltration method. Area cleaned and prepped with Alcohol.     Procedure Details  Shave biopsy completed with dermablade. Biopsy performed to the depth of other (dermis). Hemostasis obtained with aluminium chloride.       Biopsy 2    Location Details  Body area: head/neck  Site: scalp  Lesion size: 0.2 cm.    Preparation  Adequate anesthesia is achieved using 1 mL of lidocaine 1% with epinephrine in a local infiltration method. Area cleaned and prepped with Alcohol.     Procedure Details  Shave biopsy completed with dermablade. Biopsy performed to the depth of other. Hemostasis obtained with aluminum chloride.                 Post-Procedure  Specimen was sent to Pathology. Gauze and adhesive bandage applied for dressing. Patient tolerated the procedure well with no complications.     Procedure Comments  R/O atypia          Camilo was seen today for skin exam.  Diagnoses and all orders for this visit:    History of actinic keratoses  Actinic skin damage  Actinic keratoses  -     Destruction of Benign or Premalignant Lesion  - Explained to patient that  actinic damage is a chronic life long condition.  Discussed the nature of actinic damage with patient.  Informed patient to prevent further damage and to lower the risk of skin cancer a broad spectrum sunscreen should be used SPF 30-50 reapplying every two hours while outdoors.    I discussed the pathogenesis of Actinic Keratoses with patient in detail.  I informed patient these are a precursor to skin cancer and are related to long-term sun damage. We discussed that cryotherapy or topical chemotherapy is the recommended treatment.  We discussed the increased risk of development of additional Actinic Keratoses in future.  Patient in agreement to treat lesions with cryotherapy in office today, please see above for procedure details.     Seborrheic keratoses  Discussed benign nature of Seborrheic Keratoses in detail. I informed patient these are a genetic finding and not related to chronic sun exposure.  Although they can appear abnormal in appearance they are noncancerous.  Encouraged patient to call should they note any new or changing lesions.    Destruction (freezing) of benign lesions are cosmetic and not covered by insurance. I explained that cryotherapy does not have a 100% success rate and the patient may require further treatment for clearance.  This will be an additional cost for the next treatment. Estimated price for treatment is $520 for up to 14 lesions, $573 for more than 14 lesions but theses prices are subject to change at any time.     Lentigo  Discussed benign nature of lentigos, informed patient they are sun-induced brown flat lesions.     Cherry hemangioma  Discussed benign nature of Mtz Angiomas in detail. Informed patient these are genetic and hormonally induced vascular growths with no potential for malignant transformation. Removal is cosmetic and not covered by insurance.     Multiple benign nevi  Reassurance given for benign appearing nevi today, and to watch for any changes that would  suggest atypia such as itching, bleeding, changing irregular shape, increased diameter or irregularity, and multiple colors in moles.    Lipoma of torso  -Discussed benign nature of lipomas in detail.  Informed patient they are a benign overgrowth of fat which rarely cause problems.  Lesions can be removed if they are bothersome.      EIC (epidermal inclusion cyst)  -We discussed the pathogenesis of an epidermal inclusion cyst in detail.  Patient understands these are not premalignant or malignant.  I informed patient if lesion becomes bothersome we can treat with further surgical intervention in the future.    Neoplasm of uncertain behavior  -     Pathology specimen (Histology) Other 3 mm Left lateral Jawline MKG24/0846  -     Pathology specimen (Histology) Other 2 mm central posterior frontal scalp MKG24/0847  -     Lesion Biopsy  Discussed that this lesion is clinically atypical and for this reason I do recommend biopsy in the office today. Discussed that if lesion is found to be severely atypical or a skin cancer that it will require further treatment. Patient in agreement with shave biopsy in the office today. Please see above procedure for details    Return in about 6 months (around 2/12/2025) for skin exam.    I emphasized daily sunscreen use with an SPF of 30-50, broad spectrum and water resistant.  I directed reapplication every two hours.  I recommended wide brimmed hats.  Finally, we discussed danger signs of changing skin lesions including sores not healing and moles changing in size, shape or color.  Should any of these lesions occur before next appointment, I instructed patent to call sooner for evaluation.    Patient expresses understanding and agreement with the plan of care, and had no further questions at the end of the exam today.     Portions of this note may have been dictated using Dragon DMO voice recognition software. Though the note has been proofread, small discrepencies may exist.  If a  significant discrepancy is identified please alert me to further review.

## 2024-07-15 NOTE — PATIENT INSTRUCTIONS
Your nurse today was Guy! Please contact the clinic if you have any questions or concerns via [employer] or by calling [phone].    You were treated with liquid nitrogen today.     You should expect these areas to burn and once the burning subsides, the area(s)may itch. You should expect some reaction anywhere from welting of the skin to larger liquid filled blisters depending on how aggressively your lesion(s) needed to be treated. If you had treatment on your forehead, eyebrow areas or cheekbones, you could have some swelling under your eyes. This is normal and nothing to worry about. Blisters should be left intact until they pop and drain on their own. You will most likely have a clear drainage much like a blister when it pops.     The areas treated should be cared for by gentle daily cleansing with Dove soap and water and your hands. You should apply Polysporin ointment or Vaseline  WITH A Q-TIP to the areas daily as they are healing and continue this until they are completely healed. It may take anywhere from 7-14 days for areas to completely heal. If any other area than your face was treated, it may take longer for those areas to heal.    If warts were treated, they may turn to blood blisters and may appear purple, red or black. Leave the blister intact and let it pop on its own. Keep these areas dry and clean and do not use any ointment or Vaseline to warts.    IF YOU NOTICE INCREASED SURROUNDING REDNESS, TENDERNESS OR A PUS-LIKE DRAINAGE, PLEASE CONTACT OUR OFFICE IMMEDIATELY.     WOUND CARE FOLLOWING BIOPSY    After your biopsy a dressing will be placed on the site.  You may remove bandage at end of day.  This is to minimize any bleeding that can possibly occur after surgery.  To clean the area, use Dove soap, warm water and hands, no wash cloth, once daily    Next, apply a layer of POLYSPORIN ANTIBIOTIC OINTMENT or VASELINE, daily.  Do this until healed.  We will contact you with your results when  "available.  Please call with questions or concerns    If you have MyChart, your results will be released to [facility] 3 days after being signed by the provider.  Your provider will develop a plan of care for the diagnosis related to your biopsy.  Staff from our clinic will call you with the interpretation of the results and your plan of care developed by your provider.  We work hard to get these phone calls made in a timely manner, but if you have not received the results in 2 weeks, please call our office.       Avoid sunlight between the hours of 10 am and 2 pm, wear a broad spectrum, water resistant sunscreen with SPF of 30-50 year round. Reapply sunscreen every 2 hours and after exercising or swimming. Wearing a 6\" broad brimmed hat, a lip sunblock of SPF of 30-50, and sun protective clothing is also suggested year round.   Recommended sunscreens include Neutrogena Ultra Sheer Dry Touch SPF 50 or higher, Neutrogena Sheer Zinc Dry Touch SPF 50, and Vanicream Sport.  ---------------------------------------------------------------------  Monthly self-examination of your skin is important. Should any lesions, including moles, change in size, shape, color, itch, bleed or burn, contact our office for sooner evaluation.  ----------------------------------------------------------------------  **We are always looking for opportunities to improve your care and patient experience. You will be receiving a survey about your care via text or e-mail. If you come across questions in the survey that are not applicable to your visit, please leave these questions blank. Your satisfaction is important to us so please be honest. Your identity is kept confidential. Thank you!**   "

## 2024-08-12 ENCOUNTER — OFFICE VISIT (OUTPATIENT)
Dept: DERMATOLOGY | Facility: CLINIC | Age: 69
End: 2024-08-12
Payer: MEDICARE

## 2024-08-12 VITALS
SYSTOLIC BLOOD PRESSURE: 150 MMHG | DIASTOLIC BLOOD PRESSURE: 78 MMHG | OXYGEN SATURATION: 96 % | TEMPERATURE: 97.7 F | RESPIRATION RATE: 16 BRPM | HEART RATE: 71 BPM

## 2024-08-12 DIAGNOSIS — L81.4 LENTIGO: ICD-10-CM

## 2024-08-12 DIAGNOSIS — D48.9 NEOPLASM OF UNCERTAIN BEHAVIOR: ICD-10-CM

## 2024-08-12 DIAGNOSIS — L57.0 ACTINIC KERATOSES: ICD-10-CM

## 2024-08-12 DIAGNOSIS — D22.9 MULTIPLE BENIGN NEVI: ICD-10-CM

## 2024-08-12 DIAGNOSIS — L72.0 EIC (EPIDERMAL INCLUSION CYST): ICD-10-CM

## 2024-08-12 DIAGNOSIS — D17.1 LIPOMA OF TORSO: ICD-10-CM

## 2024-08-12 DIAGNOSIS — Z87.2 HISTORY OF ACTINIC KERATOSES: Primary | ICD-10-CM

## 2024-08-12 DIAGNOSIS — D18.01 CHERRY HEMANGIOMA: ICD-10-CM

## 2024-08-12 DIAGNOSIS — L57.8 ACTINIC SKIN DAMAGE: ICD-10-CM

## 2024-08-12 DIAGNOSIS — L82.1 SEBORRHEIC KERATOSES: ICD-10-CM

## 2024-08-12 PROCEDURE — 17003 DESTRUCT PREMALG LES 2-14: CPT | Mod: 59,51 | Performed by: DERMATOLOGY

## 2024-08-12 PROCEDURE — 88305 TISSUE EXAM BY PATHOLOGIST: CPT | Mod: PO | Performed by: DERMATOLOGY

## 2024-08-12 PROCEDURE — 88342 IMHCHEM/IMCYTCHM 1ST ANTB: CPT | Mod: PO | Performed by: DERMATOLOGY

## 2024-08-12 PROCEDURE — 11102 TANGNTL BX SKIN SINGLE LES: CPT | Mod: PO | Performed by: DERMATOLOGY

## 2024-08-12 PROCEDURE — 11103 TANGNTL BX SKIN EA SEP/ADDL: CPT | Performed by: DERMATOLOGY

## 2024-08-12 PROCEDURE — 17000 DESTRUCT PREMALG LESION: CPT | Mod: 59,PO | Performed by: DERMATOLOGY

## 2024-08-12 PROCEDURE — G0463 HOSPITAL OUTPT CLINIC VISIT: HCPCS | Mod: PO | Performed by: DERMATOLOGY

## 2024-08-12 PROCEDURE — 99213 OFFICE O/P EST LOW 20 MIN: CPT | Mod: 25 | Performed by: DERMATOLOGY

## 2024-08-12 ASSESSMENT — ENCOUNTER SYMPTOMS
BRUISES/BLEEDS EASILY: 0
FEVER: 0
FATIGUE: 0

## 2024-08-12 ASSESSMENT — PAIN SCALES - GENERAL: PAINLEVEL: 0-NO PAIN

## 2024-08-19 ENCOUNTER — TELEPHONE (OUTPATIENT)
Dept: DERMATOLOGY | Facility: CLINIC | Age: 69
End: 2024-08-19
Payer: MEDICARE

## 2024-08-19 DIAGNOSIS — D48.9 NEOPLASM OF UNCERTAIN BEHAVIOR: Primary | ICD-10-CM

## 2024-08-19 NOTE — TELEPHONE ENCOUNTER
----- Message from Lanie Navarro sent at 8/19/2024 12:46 PM MDT -----  Please call patient about abnormal results. A) pigmented AK precancerous lesion with atypical melanocytic proliferation (basically an extremely early melanoma, caught as evolving likely), recommend excision with 5mm margins.  B) benign blue nevus with AK (will just monitor as the rest of his head)

## 2024-08-19 NOTE — TELEPHONE ENCOUNTER
Informed patient of biopsy results of atypical melanocytic proliferation  to left lateral jawline .  Dr. Lanie Navarro DO recommends a wide excision.  Patient educated about wide excision.  Discussed with patient they may eat breakfast and take medications as normal.  All patient questions answered.

## 2024-08-22 NOTE — PATIENT INSTRUCTIONS
Your nurse today was Guy! Please contact the clinic if you have any questions or concerns via [employer] or by calling [phone].    Your sutures will dissolve in 7-12 days      Wound Care after Surgery    WOUND CARE:   Leave the pressure dressing that was placed today for AT LEAST 24 HOURS and keep it dry.    (Pressure dressings are intentionally tight to decrease bleeding, swelling and pain.)  After 24 hours, to remove the pressure dressing, it is often easiest to soak it in the shower/bath to loosen the adhesive. Once removed, gently clean the wound with mild soap (Dove Sensitive or Vanicream cleanser) and water. You can use baby shampoo when washing your hair for wounds on the scalp.   DO NOT scrub the area and DO NOT allow the shower pressure to hit the wound directly.  Apply ONLY VASELINE or PLAIN PETROLEUM to the surgical site at least once daily to prevent scabbing until sutures or staples are removed.    Wounds that are kept moist heal faster and leave a better scar than wounds that scab over.   DO NOT use any antibacterial ointment because they can cause allergic reactions (unless instructed by your physician).  There is no need to re-bandage the wound after 2 days unless there is risk of getting the wound dirty, then apply Vaseline and a non-stick bandage such as Telfa.   If you prefer to keep the wound covered, you can use non-adhesive or non-stick gauze and secure it with paper tape.   Always practice good hand hygiene > wash your hands thoroughly with soap and water before changing the dressing or touching your wound to apply Vaseline. You can also consider using disposable gloves while performing wound care.      PAIN:   Apply ice packs for at least 5-10 minutes every waking hour for 1-2 days after surgery directly over the bandage to reduce swelling and pain. You do not need to apply ice packs while sleeping.  Elevate the surgical site to minimize swelling.   If you had surgery on your head or neck,  relaxing in a recliner or sleeping with extra pillows to prop you up may be helpful.   If you had surgery on an arm or hand, you can consider using a sling to help.   If you had surgery on a leg, in addition to elevation, your doctor may recommend compression stockings when you are on your feet.   For pain/discomfort, we recommend Tylenol or Ibuprofen.   You may take up to 2 extra strength Tylenol (1,000 mg) and repeat every 8 hours. If you need pain relief in between, you can take 2 Ibuprofen tablets (400 mg) and repeat every 6 hours.   DO NOT exceed 3,000 mg of Tylenol or 2,400 mg of Ibuprofen in a 24 hour period!  For any non-resolving pain, contact the clinic.       OPTIMAL HEALING:   Avoid ANY strenuous activity (heavy lifting, bending over, or exercise) for at least one week to minimize bleeding risk and to minimize tension placed on your sutures or staples. If your surgery was on the back, arm or leg, avoid strenuous activity for 2 weeks.   Strenuous activity includes running, weight lifting, biking, yoga, elliptical, rowing and stretching.   Avoid alcohol for 2 days after surgery as this can thin your blood and cause bleeding.   Avoid smoking for at least 3 weeks as it leads to poor wound healing. It is best to stop smoking overall.       WHAT TO EXPECT DURING HEALING:  Bruising, swelling and some pain are expected after surgery. These will typically resolve in 1-2 weeks. Wounds with stitches on the hands, legs and feet may take even longer to improve.   The more active you are, the more a healing area is likely to swell and potentially cause pain.   Swelling and discomfort is especially common for healing wounds on the hands and legs as swelling tends to pool in these areas because of gravity.   Your wound may feel tight, itchy or numb, and should gradually improve over several months.   Your wound may appear red, raised or bumpy because of the internal sutures, which will gradually improve over the course  "of 3 months as the internal sutures dissolve.   In terms of your final scar, everyone is different and follows a different time course of wound healing. It may take up to 6-12 months to see what the final scar will look like.   If you have any questions or concerns, discuss with your doctor. The doctor may make some recommendations to help with the final scar appearance.       WHAT SHOULD YOU DO IF YOU EXPERIENCE...  BLEEDING:  The pressure dressing over your wound helps to stop bleeding. Any bleeding that you notice can usually be stopped with direct, firm pressure. DO NOT remove the dressing, elevate the site and apply CONSTANT PRESSURE over the dressing for 20 minutes without lifting up to check for bleeding.   If the pressure dressing becomes completely saturated with blood or if you experience active bleeding that does not resolve with 20 minutes of pressure, call your doctor's office immediately!      CONCERNS FOR INFECTION:  If you experience signs of infection such as fever, chills, sweats, increased redness, swelling, warmth, yellow or green drainage, or worsening pain to touch, call your doctor's office immediately!  Some redness over and along the suture or staple line is normal and expected.       ** IF REDNESS, UNEXPECTED SWELLING, DRAINAGE OR INCREASED PAIN DEVELOPS, PLEASE CALL OUR OFFICE AS SOON AS POSSIBLE. IF IT IS EMERGENT SUCH AS BLEEDING, OUR PHYSICIANS CAN BE REACHED THROUGH OUR ANSWERING SERVICE.  [facility] [phone]     Avoid sunlight between the hours of 10 am and 2 pm, wear a broad spectrum, water resistant sunscreen with SPF of 30-50 year round. Reapply sunscreen every 2 hours and after exercising or swimming. Wearing a 6\" broad brimmed hat, a lip sunblock of SPF of 30-50, and sun protective clothing is also suggested year round.   Recommended sunscreens include Neutrogena Ultra Sheer Dry Touch SPF 50 or higher, Neutrogena Sheer Zinc Dry Touch SPF 50, and Vanicream " Sport.  ---------------------------------------------------------------------  Monthly self-examination of your skin is important. Should any lesions, including moles, change in size, shape, color, itch, bleed or burn, contact our office for sooner evaluation.  ----------------------------------------------------------------------  **We are always looking for opportunities to improve your care and patient experience. You will be receiving a survey about your care via text or e-mail. If you come across questions in the survey that are not applicable to your visit, please leave these questions blank. Your satisfaction is important to us so please be honest. Your identity is kept confidential. Thank you!**

## 2024-08-22 NOTE — PROGRESS NOTES
Blaise Elaine is a 69 y.o.  male who presents today for a wide local excision of the biopsy proven pigmented AK with atypical intraepidermal melanocytic proliferation, Left lateral jawline, MKG24/0846.        Review of Systems   Constitutional:  Negative for fatigue and fever.   Skin:  Negative for rash.   Allergic/Immunologic: Negative for environmental allergies and immunocompromised state.   Hematological:  Does not bruise/bleed easily.   All other systems reviewed and are negative.      Past Medical History:   Diagnosis Date    Hypertension 2016       Current Outpatient Medications on File Prior to Visit   Medication Sig Dispense Refill    ketorolac (ACULAR) 0.5 % ophthalmic solution SMARTSIG:In Eye(s)      valACYclovir (VALTREX) 1 gram tablet Take 1 tablet (1,000 mg total) by mouth 2 (two) times a day for 7 days 14 tablet 0    atorvastatin (LIPITOR) 40 mg tablet Take 1 tablet (40 mg total) by mouth daily 90 tablet 3    metoprolol succinate XL (TOPROL-XL) 25 mg 24 hr tablet Take 1 tablet (25 mg total) by mouth daily 90 tablet 3    glucosamine HCl/chondroitin schmidt (GLUCOSAMINE-CHONDROITIN ORAL) Take by mouth daily      multivitamin with minerals tablet Take 1 tablet by mouth daily       No current facility-administered medications on file prior to visit.       Allergies   Allergen Reactions    Morphine Nausea And Vomiting        temporal temperature is 36.5 °C (97.7 °F). His blood pressure is 126/62 and his pulse is 82. His respiration is 16 and oxygen saturation is 97%.       Skin excision - Malignant    Date/Time: 8/27/2024 1:00 PM    Performed by: Lanie Navarro DO  Authorized by: Lanie Navarro DO    Consent  Verbal consent was obtained from the patient. Written consent was obtained from the patient. Risks include permanent scarring, light or dark discoloration, infection, pain, bleeding, bruising, redness, blister formation and recurrence of the lesion. Consent given by patient. The patient states  understanding of the procedure being performed. Patient ID confirmed verbally with the patient.     Number of Lesions:  1  Lesion 1:     Excision type:  Full-thickness wide local excision     Body area:  Head/neck    Site:  Left cheek (Left lateral jawline, G24/0846)    Lesion longest length (mm):  6    Lesion width (mm): 6    Margin size (mm):  5    Total excision size (mm): 16  Closure Details:  Defect repaired by linear closure.  Complexity of closure was  complex. Epidermis were approximated and closed using 6-0 Fast-absorbing gut. A total of sutures were placed in a horizontal mattress fashion. Dermis were approximated and closed using 5-0 Monocryl. A total of sutures were placed in a  Simple interrupted fashion. The surgical defect was 1.6 cm. The size of the repair was 3.5 cm.      Closure comments: Derm Wide Local Excision  Diagnosis: actinic keratosis with atypical melanocytic proliferation  Morphology: irregular brown patch  Precise: none  Site: Left lateral jawline, G24/0846  Pre-Op Size:  3 mm NOW 6 mm      Informed Consent: Discussed risks (permanent scarring, light or dark discoloration, infection, pain, bleeding, bruising, damage to nearby structures, damage to nerves causing numbness or weakness, and recurrence of the lesion) and benefits of the procedure, as well as the alternatives.  Informed consent was obtained.    Preparation: The area was prepared and draped in a standard fashion.    Anesthesia: Lidocaine 1% with epinephrine ([id number]) 6 cc's administered by Guy Santiago LPN    Procedure Details: The lesion was excised down to subcutaneous tissue with a 5 mm margin of normal appearing skin.      Excised diameter: 1.6 cm.    Closure:    Intermediate Closure note:  Using a clean marking pen, an elliptical excision was designed around the defect.  The area around the defect was infiltrated with lidocaine 1%, 1:100,000 epinephrine. The involved area was washed with Betadine x 6 draped in  sterile fashion. Undermining was performed by both blunt and sharp dissection to minimize wound tension and prevent distortion of surrounding anatomic structures. The wound edges were widely undermined (a distance greater than or equal to the maximum width of the defect measured perpendicular to the closure line along at least one entire edge of the defect. Hemostasis was obtained by pressure and monocryl sutures The wound was closed in 2 layers with 5-0 monocryl interrupted sutures for the deep layers and 6-0 fast gut dissolving interrupted horizontal mattress sutures for the superficial layer.     The final length of the surgical closure was 3.5 cm.    The specimen was sent for pathologic examination. The patient tolerated the procedure well.    Plan: The patient was instructed on post-op care. Recommend OTC analgesia as needed for pain.       Procedure comments: After hemostasis was noted, vaseline was liberally applied to wound.  A pressure dressing consisting of telfa, gauze and hypafix tape was then applied.          Camilo was seen today for wle.    Diagnoses and all orders for this visit:    Neoplasm of uncertain behavior  -     Skin excision - Malignant    Atypical melanocytic hyperplasia  -     Pathology specimen (Histology) Cheek MKG24/0846 Left lateral jawline    Other orders  -     Cancel: Mohs Micrographic Surgery    -borderline melanoma lentigo maligna type    Return in 1 year (on 8/20/2025), or if symptoms worsen or fail to improve, for FBSE.

## 2024-08-27 ENCOUNTER — PROCEDURE VISIT (OUTPATIENT)
Dept: DERMATOLOGY | Facility: CLINIC | Age: 69
End: 2024-08-27
Payer: MEDICARE

## 2024-08-27 VITALS
OXYGEN SATURATION: 97 % | SYSTOLIC BLOOD PRESSURE: 126 MMHG | HEART RATE: 82 BPM | RESPIRATION RATE: 16 BRPM | DIASTOLIC BLOOD PRESSURE: 62 MMHG | TEMPERATURE: 97.7 F

## 2024-08-27 DIAGNOSIS — D48.9 NEOPLASM OF UNCERTAIN BEHAVIOR: Primary | ICD-10-CM

## 2024-08-27 DIAGNOSIS — L81.8 ATYPICAL MELANOCYTIC HYPERPLASIA: ICD-10-CM

## 2024-08-27 PROCEDURE — 11442 EXC FACE-MM B9+MARG 1.1-2 CM: CPT | Mod: PO | Performed by: DERMATOLOGY

## 2024-08-27 PROCEDURE — 88342 IMHCHEM/IMCYTCHM 1ST ANTB: CPT | Mod: PO | Performed by: DERMATOLOGY

## 2024-08-27 PROCEDURE — 11442 EXC FACE-MM B9+MARG 1.1-2 CM: CPT | Mod: 51 | Performed by: DERMATOLOGY

## 2024-08-27 PROCEDURE — 88305 TISSUE EXAM BY PATHOLOGIST: CPT | Mod: PO | Performed by: DERMATOLOGY

## 2024-08-27 PROCEDURE — 12052 INTMD RPR FACE/MM 2.6-5.0 CM: CPT | Performed by: DERMATOLOGY

## 2024-08-27 PROCEDURE — 12052 INTMD RPR FACE/MM 2.6-5.0 CM: CPT | Mod: PO | Performed by: DERMATOLOGY

## 2024-08-27 ASSESSMENT — ENCOUNTER SYMPTOMS
FATIGUE: 0
FEVER: 0
BRUISES/BLEEDS EASILY: 0

## 2024-08-27 ASSESSMENT — PAIN SCALES - GENERAL: PAINLEVEL: 0-NO PAIN

## 2024-09-06 ENCOUNTER — TELEPHONE (OUTPATIENT)
Dept: DERMATOLOGY | Facility: CLINIC | Age: 69
End: 2024-09-06
Payer: MEDICARE

## 2024-09-06 DIAGNOSIS — L81.8 ATYPICAL MELANOCYTIC HYPERPLASIA: Primary | ICD-10-CM

## 2024-09-06 RX ORDER — IMIQUIMOD 12.5 MG/.25G
CREAM TOPICAL
Qty: 12 EACH | Refills: 0 | Status: SHIPPED | OUTPATIENT
Start: 2024-09-06 | End: 2024-10-07 | Stop reason: SDUPTHER

## 2024-09-06 NOTE — TELEPHONE ENCOUNTER
----- Message from Lanie Navarro sent at 9/5/2024  1:27 PM MDT -----  Please call patient about pathology results showing that the lesion was cleared but around it there was extensive melanocyte hyperplasia.  In this scenario we could aggressively treat the area once it has healed with a cream that helps induce an immune response to clear some of the atypical cells.  I would choose imiquimod every evening for 2 to 4 weeks, stop when too much irritation.

## 2024-09-19 DIAGNOSIS — E78.5 HYPERLIPIDEMIA LDL GOAL <100: ICD-10-CM

## 2024-09-19 RX ORDER — ATORVASTATIN CALCIUM 40 MG/1
40 TABLET, FILM COATED ORAL DAILY
Qty: 30 TABLET | Refills: 0 | Status: SHIPPED | OUTPATIENT
Start: 2024-09-19 | End: 2024-09-19

## 2024-09-19 RX ORDER — ATORVASTATIN CALCIUM 40 MG/1
40 TABLET, FILM COATED ORAL DAILY
Qty: 90 TABLET | Refills: 0 | Status: SHIPPED | OUTPATIENT
Start: 2024-09-19 | End: 2024-10-07 | Stop reason: SDUPTHER

## 2024-09-19 NOTE — TELEPHONE ENCOUNTER
No care due was identified.  [facility] Embedded Care Due Messages. Reference number: [id number]. 9/19/2024 10:59:44 AM WONG

## 2024-09-19 NOTE — TELEPHONE ENCOUNTER
Care Due:                  Date            Visit Type   Department     Provider  --------------------------------------------------------------------------------                              MEDICARE                              WELLNESS     [facility]  Last Visit: 09-      VISIT        MEDICINE       [facility]                                           [facility]  Next Visit: 10-      PHYSICAL     MEDICINE       [facility]                                                            Last  Test          Frequency    Reason                     Performed    Due Date  --------------------------------------------------------------------------------  Lipid Panel.  12 months..  atorvastatin.............  Not Found    Overdue    [employer] Embedded Care Due Messages. Reference number: [id number]. 9/19/2024 9:38:24 AM WONG

## 2024-10-07 ENCOUNTER — OFFICE VISIT (OUTPATIENT)
Dept: FAMILY MEDICINE | Facility: CLINIC | Age: 69
End: 2024-10-07
Payer: MEDICARE

## 2024-10-07 VITALS
HEART RATE: 74 BPM | SYSTOLIC BLOOD PRESSURE: 138 MMHG | BODY MASS INDEX: 28.25 KG/M2 | OXYGEN SATURATION: 95 % | DIASTOLIC BLOOD PRESSURE: 88 MMHG | HEIGHT: 76 IN | WEIGHT: 232 LBS | RESPIRATION RATE: 18 BRPM | TEMPERATURE: 98 F

## 2024-10-07 DIAGNOSIS — Z23 IMMUNIZATION DUE: ICD-10-CM

## 2024-10-07 DIAGNOSIS — I10 ESSENTIAL HYPERTENSION: ICD-10-CM

## 2024-10-07 DIAGNOSIS — E78.5 HYPERLIPIDEMIA LDL GOAL <100: ICD-10-CM

## 2024-10-07 DIAGNOSIS — Z12.5 SCREENING FOR MALIGNANT NEOPLASM OF PROSTATE: ICD-10-CM

## 2024-10-07 DIAGNOSIS — L81.8 ATYPICAL MELANOCYTIC HYPERPLASIA: ICD-10-CM

## 2024-10-07 DIAGNOSIS — Z00.00 ENCOUNTER FOR MEDICARE ANNUAL WELLNESS EXAM: Primary | ICD-10-CM

## 2024-10-07 LAB
ALBUMIN SERPL-MCNC: 4.1 G/DL (ref 3.5–5.3)
ALP SERPL-CCNC: 72 U/L (ref 45–115)
ALT SERPL-CCNC: 42 U/L (ref 7–52)
ANION GAP SERPL CALC-SCNC: 8 MMOL/L (ref 3–11)
AST SERPL-CCNC: 32 U/L
BASOPHILS # BLD AUTO: 0 10*3/UL
BASOPHILS NFR BLD AUTO: 0.4 % (ref 0–2)
BILIRUB SERPL-MCNC: 0.66 MG/DL (ref 0.2–1.4)
BUN SERPL-MCNC: 18 MG/DL (ref 7–25)
CALCIUM ALBUM COR SERPL-MCNC: 9.4 MG/DL (ref 8.6–10.3)
CALCIUM SERPL-MCNC: 9.5 MG/DL (ref 8.6–10.3)
CHLORIDE SERPL-SCNC: 104 MMOL/L (ref 98–107)
CHOLEST SERPL-MCNC: 154 MG/DL (ref 0–199)
CO2 SERPL-SCNC: 25 MMOL/L (ref 21–32)
CREAT SERPL-MCNC: 0.79 MG/DL (ref 0.7–1.3)
EGFRCR SERPLBLD CKD-EPI 2021: 97 ML/MIN/1.73M*2
EOSINOPHIL # BLD AUTO: 0 10*3/UL
EOSINOPHIL NFR BLD AUTO: 0.5 % (ref 0–3)
ERYTHROCYTE [DISTWIDTH] IN BLOOD BY AUTOMATED COUNT: 13.9 % (ref 11.5–15)
FASTING STATUS PATIENT QL REPORTED: YES
GLUCOSE SERPL-MCNC: 79 MG/DL (ref 70–105)
HCT VFR BLD AUTO: 44.7 % (ref 38–50)
HDLC SERPL-MCNC: 62 MG/DL
HGB BLD-MCNC: 15.3 G/DL (ref 13.2–17.2)
LDLC SERPL CALC-MCNC: 56 MG/DL (ref 20–99)
LYMPHOCYTES # BLD AUTO: 1.2 10*3/UL
LYMPHOCYTES NFR BLD AUTO: 25.9 % (ref 15–47)
MCH RBC QN AUTO: 33.3 PG (ref 29–34)
MCHC RBC AUTO-ENTMCNC: 34.2 G/DL (ref 32–36)
MCV RBC AUTO: 97.4 FL (ref 82–97)
MONOCYTES # BLD AUTO: 0.6 10*3/UL
MONOCYTES NFR BLD AUTO: 12.1 % (ref 5–13)
NEUTROPHILS # BLD AUTO: 2.9 10*3/UL
NEUTROPHILS NFR BLD AUTO: 61.1 % (ref 46–70)
PLATELET # BLD AUTO: 191 10*3/UL (ref 130–350)
PMV BLD AUTO: 6.7 FL (ref 6.9–10.8)
POTASSIUM SERPL-SCNC: 4.2 MMOL/L (ref 3.5–5.1)
PROT SERPL-MCNC: 6.8 G/DL (ref 6–8.3)
PSA SERPL-MCNC: 2.26 NG/ML (ref 0–4)
RBC # BLD AUTO: 4.59 10*6/UL (ref 4.1–5.8)
SODIUM SERPL-SCNC: 137 MMOL/L (ref 135–145)
TRIGL SERPL-MCNC: 181 MG/DL
WBC # BLD AUTO: 4.7 10*3/UL (ref 3.7–9.6)

## 2024-10-07 PROCEDURE — G0438 PPPS, INITIAL VISIT: HCPCS | Performed by: FAMILY MEDICINE

## 2024-10-07 PROCEDURE — 80053 COMPREHEN METABOLIC PANEL: CPT | Performed by: FAMILY MEDICINE

## 2024-10-07 PROCEDURE — 80061 LIPID PANEL: CPT | Performed by: FAMILY MEDICINE

## 2024-10-07 PROCEDURE — 36415 COLL VENOUS BLD VENIPUNCTURE: CPT | Performed by: FAMILY MEDICINE

## 2024-10-07 PROCEDURE — 85025 COMPLETE CBC W/AUTO DIFF WBC: CPT | Performed by: FAMILY MEDICINE

## 2024-10-07 PROCEDURE — 90471 IMMUNIZATION ADMIN: CPT

## 2024-10-07 PROCEDURE — G0439 PPPS, SUBSEQ VISIT: HCPCS | Performed by: FAMILY MEDICINE

## 2024-10-07 PROCEDURE — 84153 ASSAY OF PSA TOTAL: CPT | Performed by: FAMILY MEDICINE

## 2024-10-07 PROCEDURE — G0463 HOSPITAL OUTPT CLINIC VISIT: HCPCS

## 2024-10-07 RX ORDER — VALACYCLOVIR HYDROCHLORIDE 500 MG/1
1000 TABLET, FILM COATED ORAL AS NEEDED
Qty: 14 TABLET | Refills: 5 | Status: SHIPPED | OUTPATIENT
Start: 2024-10-07

## 2024-10-07 RX ORDER — ATORVASTATIN CALCIUM 40 MG/1
40 TABLET, FILM COATED ORAL DAILY
Qty: 90 TABLET | Refills: 3 | Status: SHIPPED | OUTPATIENT
Start: 2024-10-07

## 2024-10-07 RX ORDER — IMIQUIMOD 12.5 MG/.25G
CREAM TOPICAL
Qty: 12 EACH | Refills: 1 | Status: SHIPPED | OUTPATIENT
Start: 2024-10-07

## 2024-10-07 RX ORDER — METOPROLOL SUCCINATE 25 MG/1
25 TABLET, EXTENDED RELEASE ORAL DAILY
Qty: 90 TABLET | Refills: 3 | Status: SHIPPED | OUTPATIENT
Start: 2024-10-07 | End: 2025-10-07

## 2024-10-07 SDOH — ECONOMIC STABILITY: INCOME INSECURITY: IN THE PAST 12 MONTHS HAS THE ELECTRIC, GAS, OIL, OR WATER COMPANY THREATENED TO SHUT OFF SERVICES IN YOUR HOME?: NO

## 2024-10-07 SDOH — HEALTH STABILITY: MENTAL HEALTH: HOW OFTEN DO YOU HAVE A DRINK CONTAINING ALCOHOL?: 4 OR MORE TIMES A WEEK

## 2024-10-07 SDOH — HEALTH STABILITY: MENTAL HEALTH: HOW OFTEN DO YOU HAVE SIX OR MORE DRINKS ON ONE OCCASION?: LESS THAN MONTHLY

## 2024-10-07 SDOH — HEALTH STABILITY: MENTAL HEALTH
DO YOU FEEL STRESS - TENSE, RESTLESS, NERVOUS, OR ANXIOUS, OR UNABLE TO SLEEP AT NIGHT BECAUSE YOUR MIND IS TROUBLED ALL THE TIME - THESE DAYS?: NOT AT ALL

## 2024-10-07 SDOH — SOCIAL STABILITY: SOCIAL NETWORK
DO YOU BELONG TO ANY CLUBS OR ORGANIZATIONS SUCH AS CHURCH GROUPS, UNIONS, FRATERNAL OR ATHLETIC GROUPS, OR SCHOOL GROUPS?: YES

## 2024-10-07 SDOH — SOCIAL STABILITY: SOCIAL NETWORK
IN A TYPICAL WEEK, HOW MANY TIMES DO YOU TALK ON THE PHONE WITH FAMILY, FRIENDS, OR NEIGHBORS?: MORE THAN THREE TIMES A WEEK

## 2024-10-07 SDOH — ECONOMIC STABILITY: HOUSING INSECURITY: AT ANY TIME IN THE PAST 12 MONTHS, WERE YOU HOMELESS OR LIVING IN A SHELTER (INCLUDING NOW)?: NO

## 2024-10-07 SDOH — SOCIAL STABILITY: SOCIAL INSECURITY: WITHIN THE LAST YEAR, HAVE YOU BEEN AFRAID OF YOUR PARTNER OR EX-PARTNER?: NO

## 2024-10-07 SDOH — ECONOMIC STABILITY: TRANSPORTATION INSECURITY: IN THE PAST 12 MONTHS, HAS LACK OF TRANSPORTATION KEPT YOU FROM MEDICAL APPOINTMENTS OR FROM GETTING MEDICATIONS?: NO

## 2024-10-07 SDOH — SOCIAL STABILITY: SOCIAL NETWORK: HOW OFTEN DO YOU ATTEND CHURCH OR RELIGIOUS SERVICES?: 1 TO 4 TIMES PER YEAR

## 2024-10-07 SDOH — SOCIAL STABILITY: SOCIAL NETWORK: HOW OFTEN DO YOU ATTEND MEETINGS OF THE CLUBS OR ORGANIZATIONS YOU BELONG TO?: MORE THAN 4 TIMES PER YEAR

## 2024-10-07 SDOH — SOCIAL STABILITY: SOCIAL NETWORK: HOW OFTEN DO YOU GET TOGETHER WITH FRIENDS OR RELATIVES?: MORE THAN THREE TIMES A WEEK

## 2024-10-07 SDOH — SOCIAL STABILITY: SOCIAL INSECURITY: ARE YOU MARRIED, WIDOWED, DIVORCED, SEPARATED, NEVER MARRIED, OR LIVING WITH A PARTNER?: WIDOWED

## 2024-10-07 SDOH — HEALTH STABILITY: PHYSICAL HEALTH: ON AVERAGE, HOW MANY MINUTES DO YOU ENGAGE IN EXERCISE AT THIS LEVEL?: 50 MIN

## 2024-10-07 SDOH — SOCIAL STABILITY: SOCIAL INSECURITY
WITHIN THE LAST YEAR, HAVE YOU BEEN RAPED OR FORCED TO HAVE ANY KIND OF SEXUAL ACTIVITY BY YOUR PARTNER OR EX-PARTNER?: NO

## 2024-10-07 SDOH — ECONOMIC STABILITY: FOOD INSECURITY: HOW HARD IS IT FOR YOU TO PAY FOR THE VERY BASICS LIKE FOOD, HOUSING, MEDICAL CARE, AND HEATING?: NOT HARD AT ALL

## 2024-10-07 SDOH — HEALTH STABILITY: PHYSICAL HEALTH: ON AVERAGE, HOW MANY DAYS PER WEEK DO YOU ENGAGE IN MODERATE TO STRENUOUS EXERCISE (LIKE A BRISK WALK)?: 7 DAYS

## 2024-10-07 SDOH — ECONOMIC STABILITY: HOUSING INSECURITY: IN THE LAST 12 MONTHS, WAS THERE A TIME WHEN YOU WERE NOT ABLE TO PAY THE MORTGAGE OR RENT ON TIME?: NO

## 2024-10-07 SDOH — SOCIAL STABILITY: SOCIAL INSECURITY: WITHIN THE LAST YEAR, HAVE YOU BEEN HUMILIATED OR EMOTIONALLY ABUSED IN OTHER WAYS BY YOUR PARTNER OR EX-PARTNER?: NO

## 2024-10-07 SDOH — ECONOMIC STABILITY: FOOD INSECURITY: WITHIN THE PAST 12 MONTHS, THE FOOD YOU BOUGHT JUST DIDN'T LAST AND YOU DIDN'T HAVE MONEY TO GET MORE.: NEVER TRUE

## 2024-10-07 SDOH — ECONOMIC STABILITY: FOOD INSECURITY: WITHIN THE PAST 12 MONTHS, YOU WORRIED THAT YOUR FOOD WOULD RUN OUT BEFORE YOU GOT THE MONEY TO BUY MORE.: NEVER TRUE

## 2024-10-07 SDOH — SOCIAL STABILITY: SOCIAL INSECURITY
WITHIN THE LAST YEAR, HAVE YOU BEEN KICKED, HIT, SLAPPED, OR OTHERWISE PHYSICALLY HURT BY YOUR PARTNER OR EX-PARTNER?: NO

## 2024-10-07 SDOH — HEALTH STABILITY: MENTAL HEALTH: HOW MANY DRINKS CONTAINING ALCOHOL DO YOU HAVE ON A TYPICAL DAY WHEN YOU ARE DRINKING?: 1 OR 2

## 2024-10-07 SDOH — HEALTH STABILITY: PHYSICAL HEALTH
HOW OFTEN DO YOU NEED TO HAVE SOMEONE HELP YOU WHEN YOU READ INSTRUCTIONS, PAMPHLETS, OR OTHER WRITTEN MATERIAL FROM YOUR DOCTOR OR PHARMACY?: NEVER

## 2024-10-07 SDOH — ECONOMIC STABILITY: HOUSING INSECURITY: IN THE PAST 12 MONTHS, HOW MANY TIMES HAVE YOU MOVED WHERE YOU WERE LIVING?: 0

## 2024-10-07 ASSESSMENT — ACTIVITIES OF DAILY LIVING (ADL): LACK_OF_TRANSPORTATION: NO

## 2024-10-07 ASSESSMENT — LIFESTYLE VARIABLES
SKIP TO QUESTIONS 9-10: 0
AUDIT-C TOTAL SCORE: 5

## 2024-10-07 ASSESSMENT — PATIENT HEALTH QUESTIONNAIRE - PHQ9
1. LITTLE INTEREST OR PLEASURE IN DOING THINGS: NOT AT ALL
SUM OF ALL RESPONSES TO PHQ9 QUESTIONS 1 & 2: 0
2. FEELING DOWN, DEPRESSED OR HOPELESS: NOT AT ALL

## 2024-10-07 ASSESSMENT — PAIN SCALES - GENERAL: PAINLEVEL: 0-NO PAIN

## 2024-10-07 NOTE — PATIENT INSTRUCTIONS
I recommend taking 1000 to 2000 international units of vitamin D3 daily.  This can be purchased over-the-counter.  It is more effective if you find the kind that is coupled with vitamin K2.

## 2024-10-07 NOTE — PROGRESS NOTES
Subjective     Blaise Elaine is a 69 y.o. male who presents for Subsequent Medicare Wellness Exam (S-MWE), hyperlipidemia, essential hypertension.    History of Present Illness    The patient, with a history of hypertension, presented for an annual exam and medication refills. He reported feeling well and staying active, having recently completed a home remodeling project.    The patient reported a recent episode of suspected urinary tract infection (UTI) or kidney stone. He described a lack of typical back pain associated with kidney stones but experienced urinary symptoms similar to a UTI. After receiving antibiotics, he passed what he described as 'gravel,' suggesting a possible kidney stone.    He also mentioned a chronic ear condition, characterized by a hole in the ear, leading to frequent infections when exposed to water. He has been advised by multiple ear, nose, and throat specialists that there is no treatment available for this condition.    The patient also reported a recent echocardiogram due to a suspected heart murmur, which returned normal results. He is currently on metoprolol and atorvastatin for hypertension, and valacyclovir for cold sores. He requested additional refills for the valacyclovir to manage his symptoms more effectively.    Lastly, the patient mentioned taking a multivitamin and consuming milk for vitamin D, but was advised to supplement with additional vitamin D3. He agreed to this recommendation.             Screenings  Medicare Health Status Self-Assessment  General Health  Have you been hospitalized since we last saw you?: No  Have you seen a health care provider outside our clinic?: Yes  In general, how would you say your health is?: Good  In general, how satisfied are you with your life?: Good  How would you describe the condition of your mouth and teeth?: Good  Do you go to the dentist regularly?: Yes  Do you feel tired during the daytime?: No  Do you or friends or  family have any concerns about memory?: No  Do you have any problems with your hearing?: No  Are you on opioids for pain?: No  Do you have an addiction problem?: No  Do you have trouble managing your anger?: No    Safety and ADLs  Safety and ADLs  Do you always fasten your seat belt when you are in the car?: Yes  Do you know where to locate and properly use a first aid kit and fire extinguisher in case of an emergency?: Yes  Does your home have rugs in the walkways?: Yes  Does your home have grab bars in the bathroom?: Yes  Does your home have handrails on the stairs?: Yes  Does your home have good lighting?: Yes  Struggle For Care  Do you struggle with managing your finances?: No  Do you struggle with managing your medications?: No  Do you struggle with getting dressed? : No  Do you struggle with bathing? : No  Do you have trouble getting around your home? : No  Do you have trouble shopping?: No  Do you struggle with housekeeping?: No  Do you struggle with preparing meals?: No    Diet and Exercise  Diet and Exercise  Do you follow a special diet at home?: No  How many servings of fruits and vegetables do you get daily? : -1    Advance Care Planning  Advance Care Planning  Would you like information on advance care planning such as living will?: Already in place    Depression Screening  Over the past 2 weeks, how often have you been bothered by any of the following problems?  Little interest or pleasure in doing things: Not at all  Feeling down, depressed, or hopeless: Not at all  Patient Health Questionnaire-2 Score: 0    Fall Risk Assessment  Get Up and Go  Get Up and Go Score: Normal: Fast (less than 11 sec with good balance)    Durable Medical Equipment  Durable Medical Equipment  Durable Medical Equipment: None    Patient Care Team:  Vega Jordan DO as PCP - General (Family Medicine)        SLUMS (if blank, screening not completed)  Orientation  What day of the week is it?: correct  What is the year?:  correct  What state are we in?: correct Memory  Please remember these five objects, I will ask you to recall them later: Apple, Pen, Tie, House, Car: apple, pen, tie, house, car You have $100 and you buy a dozen apples for $3 and a tricycle for $20.  How much did you spend?: correct ($23)  How much do you have left?: correct ($77)  Recall  What were the five objects you were asked to remember? : 5 recalled I am going to give you a series of numbers and I would like you to give them to me backwards. For example, if I say 42, you would say 24.   8537: correct  649: correct List Animals  Please name as many animals as you can in one minute. : 15+  Please draw a clock showing 3 o'clock.   Hour markers correct?: correct  Time correct?: correct Figures  Show the patient a picture of a square, a triangle and a rectangle. Ask them to put an X in the triangle.: correct  Ask the patient to identify which of the previous shapes is the largest. : correct Please recall as much of the following story as possible:   What was the female's name? : correct  What kind of work did she do?: correct  When did she go back to work?: correct  What state did they live in? : correct  SLUMS Total  SLUMS Total Score: 30    Mini-Mental Exam (if blank, screening not completed)  Mini Mental Short Exam  Word Recall: 3  Clock Draw: 2  Total Score: : 5                             Comprehensive Medical and Social History  Past Medical History:   Diagnosis Date    Hypertension 2016     Past Surgical History:   Procedure Laterality Date    COLONOSCOPY N/A 8/3/2023    Procedure: SCREENING COLONOSCOPY with Polypectomies;  Surgeon: Elan Loja MD;  Location: [facility];  Service: Endoscopy;  Laterality: N/A;    HERNIA REPAIR  2018     Social History     Socioeconomic History    Marital status:    Tobacco Use    Smoking status: Former     Current packs/day: 0.00     Average packs/day: 1 pack/day for 5.0 years (5.0 ttl pk-yrs)     Types:  Cigarettes     Start date: 1976     Quit date: 1981     Years since quittin.2    Smokeless tobacco: Never   Vaping Use    Vaping status: Never Used   Substance and Sexual Activity    Alcohol use: Yes     Alcohol/week: 14.0 standard drinks of alcohol     Types: 14 Shots of liquor per week     Comment: 2 to 3 drinks before dinner    Drug use: Never     Social Determinants of Health     Tobacco Use: Medium Risk (2024)    Patient History     Smoking Tobacco Use: Former     Smokeless Tobacco Use: Never   Alcohol Use: Alcohol Misuse (10/7/2024)    AUDIT-C     Frequency of Alcohol Consumption: 4 or more times a week     Average Number of Drinks: 1 or 2     Frequency of Binge Drinking: Less than monthly   Financial Resource Strain: Low Risk  (10/7/2024)    Overall Financial Resource Strain (CARDIA)     Difficulty of Paying Living Expenses: Not hard at all   Food Insecurity: No Food Insecurity (10/7/2024)    Hunger Vital Sign     Worried About Running Out of Food in the Last Year: Never true     Ran Out of Food in the Last Year: Never true   Transportation Needs: No Transportation Needs (10/7/2024)    PRAPARE - Transportation     Lack of Transportation (Medical): No     Lack of Transportation (Non-Medical): No   Physical Activity: Sufficiently Active (10/7/2024)    Exercise Vital Sign     Days of Exercise per Week: 7 days     Minutes of Exercise per Session: 50 min   Stress: No Stress Concern Present (10/7/2024)    [employer] [place] [employer] - Occupational Stress Questionnaire     Feeling of Stress : Not at all   Social Connections: Moderately Integrated (10/7/2024)    Social Connection and Isolation Panel [NHANES]     Frequency of Communication with Friends and Family: More than three times a week     Frequency of Social Gatherings with Friends and Family: More than three times a week     Attends Protestant Services: 1 to 4 times per year     Active Member of Clubs or Organizations: Yes      "Attends Club or Organization Meetings: More than 4 times per year     Marital Status:    Depression: Not at risk (9/25/2019)    Received from [facility]    PHQ-2     PHQ-2 Score: 0   Housing Stability: Low Risk  (10/7/2024)    Housing Stability Vital Sign     Unable to Pay for Housing in the Last Year: No     Number of Times Moved in the Last Year: 0     Homeless in the Last Year: No   Utilities: Not At Risk (10/7/2024)    [facility] Utilities     Threatened with loss of utilities: No     Family History   Problem Relation Age of Onset    Valvular heart disease Father     Prostate cancer Father     Skin cancer Father     Colon cancer Brother 45        living    Skin cancer Mother      Allergies   Allergen Reactions    Morphine Nausea And Vomiting     Current Outpatient Medications   Medication Sig Dispense Refill    imiquimod (Aldara) 5 % cream Apply 1 application nightly to the area on your left cheek for 4-6 weeks. Wash hands prior to and following application. 12 each 0    glucosamine HCl/chondroitin schmidt (GLUCOSAMINE-CHONDROITIN ORAL) Take by mouth daily      multivitamin with minerals tablet Take 1 tablet by mouth daily      valACYclovir (VALTREX) 500 mg tablet Take 2 tablets (1,000 mg total) by mouth as needed (as needed) 14 tablet 5    metoprolol succinate XL (TOPROL-XL) 25 mg 24 hr tablet Take 1 tablet (25 mg total) by mouth daily 90 tablet 3    atorvastatin (LIPITOR) 40 mg tablet Take 1 tablet (40 mg total) by mouth daily 90 tablet 3     No current facility-administered medications for this visit.       The following have been reviewed and updated as appropriate in this visit:          Review of Systems  10 point ROS negative other than HPI.     Objective     Vitals:    10/07/24 1145   BP: 138/88   Temp: 36.7 °C (98 °F)   TempSrc: Oral   Pulse: 74   Resp: 18   SpO2: 95%   Height: 1.93 m (6' 4\")   Weight: 105.2 kg (232 lb)   PainSc: 0-No pain   Patient Position: Sitting     Body mass index is 28.24 " kg/m².    Physical Exam  Vitals reviewed.   Constitutional:       Appearance: Normal appearance.   HENT:      Head: Normocephalic and atraumatic.      Ears:      Comments: Right ear chronic tympanic membrane rupture.      Mouth/Throat:      Mouth: Mucous membranes are moist.      Pharynx: Oropharynx is clear.   Eyes:      Extraocular Movements: Extraocular movements intact.      Conjunctiva/sclera: Conjunctivae normal.   Cardiovascular:      Rate and Rhythm: Normal rate and regular rhythm.      Pulses: Normal pulses.      Heart sounds: Normal heart sounds. No murmur heard.  Pulmonary:      Effort: Pulmonary effort is normal. No respiratory distress.      Breath sounds: Normal breath sounds. No wheezing.   Musculoskeletal:         General: No deformity.   Skin:     General: Skin is warm and dry.   Neurological:      General: No focal deficit present.      Mental Status: He is alert.   Psychiatric:         Mood and Affect: Mood normal.         Behavior: Behavior normal.         Assessment/Plan     Annual Physical Exam    He remains active, engaging in house remodeling, with no new complaints or concerns. We will continue his current medications: Metoprolol and Atorvastatin, and refill Valacyclovir, adding more refills for convenience.    Vaccinations    He is due for his annual influenza vaccine, which we will administer today. Despite discussions about the new RSV and COVID-19 vaccines, he declined both.    Vitamin D Deficiency    He likely does not receive sufficient Vitamin D from his diet and multivitamin. We recommend an additional intake of 7007-3796 international units of Vitamin D3 daily, ideally with Vitamin K2.    History of Kidney Stones    He experienced a likely kidney stone episode in July, which resolved without intervention. Currently, he exhibits no symptoms, necessitating no specific plan.    Chronic Ear Condition    He manages a chronic ear condition with a hole in the eardrum by avoiding swimming  to prevent infections. No specific plan is required at this time.    Screenings    He is up-to-date on colon cancer screening, regular eye exams, and had a recent echocardiogram for a suspected murmur, which was normal. We will continue regular screenings as recommended.    Lab Work    Blood work grossly normal.  Slightly elevated triglyceride level.  This includes CBC, CMP and lipid panel.  Awaiting PSA result.    Follow-up    No specific follow-up visit is needed unless there are concerns with the lab results.           During the course of the visit the patient was educated and counseled about appropriate screening and preventive services including:   Advanced Directives and End of Life Planning: Reviewed and discussed with patient.  Calcium 1200mg and Vitamin D 1000-2000iu recommended daily.  Cholesterol Screening: Reviewed and discussed with patient.  Colorectal Cancer Screening: Reviewed and discussed with patient.  Depression screening performed, results documented in note.  Diabetes Screening: Reviewed and discussed with patient.  Indicated labs ordered as above  Influenza vaccine:  Reviewed need for annual immunization.  Pneumococcal Vaccine:  Reviewed and discussed with patient.  Shingles Vaccine:  Reviewed and discussed with patient.    Written screening schedule individualized for the patient based on current USPSTF, age-appropriate medicare services and ACIP as described above was given and viewable in Patient Instructions.    Mental health conditions and/or risk factors are as listed (if any) in the ROS above as specific to this patient.  Direct cognitive impairment was assessed.      Patient's lifestyle was evaluated to promote wellness in terms of but not limited to: fall prevention, nutrition, physical activity, tobacco/alcohol if present and weight management.  These were addressed specifically with this patient as listed within the social history, discussion notes, and above.

## 2025-07-22 ENCOUNTER — OFFICE VISIT (OUTPATIENT)
Dept: FAMILY MEDICINE | Facility: CLINIC | Age: 70
End: 2025-07-22
Payer: MEDICARE

## 2025-07-22 ENCOUNTER — LAB (OUTPATIENT)
Dept: FAMILY MEDICINE | Facility: CLINIC | Age: 70
End: 2025-07-22
Payer: MEDICARE

## 2025-07-22 ENCOUNTER — HOSPITAL ENCOUNTER (OUTPATIENT)
Dept: RADIOLOGY | Facility: HOSPITAL | Age: 70
Discharge: HOME | End: 2025-07-22
Payer: MEDICARE

## 2025-07-22 VITALS
DIASTOLIC BLOOD PRESSURE: 70 MMHG | SYSTOLIC BLOOD PRESSURE: 122 MMHG | BODY MASS INDEX: 29.21 KG/M2 | RESPIRATION RATE: 21 BRPM | WEIGHT: 240 LBS | OXYGEN SATURATION: 98 % | HEART RATE: 78 BPM

## 2025-07-22 DIAGNOSIS — R73.01 IFG (IMPAIRED FASTING GLUCOSE): ICD-10-CM

## 2025-07-22 DIAGNOSIS — M19.011 PRIMARY OSTEOARTHRITIS OF RIGHT SHOULDER: ICD-10-CM

## 2025-07-22 DIAGNOSIS — G89.29 CHRONIC RIGHT SHOULDER PAIN: ICD-10-CM

## 2025-07-22 DIAGNOSIS — E78.5 HYPERLIPIDEMIA LDL GOAL <100: ICD-10-CM

## 2025-07-22 DIAGNOSIS — R79.9 ABNORMAL FINDING OF BLOOD CHEMISTRY, UNSPECIFIED: ICD-10-CM

## 2025-07-22 DIAGNOSIS — R93.89 ABNORMAL FINDINGS ON DIAGNOSTIC IMAGING OF OTHER SPECIFIED BODY STRUCTURES: ICD-10-CM

## 2025-07-22 DIAGNOSIS — G89.29 CHRONIC RIGHT SHOULDER PAIN: Primary | ICD-10-CM

## 2025-07-22 DIAGNOSIS — M25.511 CHRONIC RIGHT SHOULDER PAIN: ICD-10-CM

## 2025-07-22 DIAGNOSIS — R53.83 FATIGUE, UNSPECIFIED TYPE: ICD-10-CM

## 2025-07-22 DIAGNOSIS — M25.511 CHRONIC RIGHT SHOULDER PAIN: Primary | ICD-10-CM

## 2025-07-22 LAB
25(OH)D3 SERPL-MCNC: 70 NG/ML (ref 30–100)
ALBUMIN SERPL-MCNC: 4.3 G/DL (ref 3.5–5.3)
ALP SERPL-CCNC: 67 U/L (ref 45–115)
ALT SERPL-CCNC: 41 U/L (ref 7–52)
ANION GAP SERPL CALC-SCNC: 7 MMOL/L (ref 3–11)
AST SERPL-CCNC: 34 U/L
BACTERIA #/AREA URNS HPF: ABNORMAL /HPF
BASOPHILS # BLD AUTO: 0.03 10*3/UL
BASOPHILS NFR BLD AUTO: 0.5 % (ref 0–2)
BILIRUB SERPL-MCNC: 0.75 MG/DL (ref 0.2–1.4)
BUN SERPL-MCNC: 16 MG/DL (ref 7–25)
CALCIUM ALBUM COR SERPL-MCNC: 9.3 MG/DL (ref 8.6–10.3)
CALCIUM SERPL-MCNC: 9.5 MG/DL (ref 8.6–10.3)
CHLORIDE SERPL-SCNC: 104 MMOL/L (ref 98–107)
CO2 SERPL-SCNC: 27 MMOL/L (ref 21–32)
CREAT SERPL-MCNC: 0.91 MG/DL (ref 0.7–1.3)
CRP SERPL-MCNC: 1.6 MG/L
EGFRCR SERPLBLD CKD-EPI 2021: 91 ML/MIN/1.73M*2
EOSINOPHIL # BLD AUTO: 0.05 10*3/UL
EOSINOPHIL NFR BLD AUTO: 0.9 % (ref 0–3)
ERYTHROCYTE DISTRIBUTION WIDTH STANDARD DEVIATION: 48.6 FL (ref 35.1–43.9)
ERYTHROCYTE [DISTWIDTH] IN BLOOD BY AUTOMATED COUNT: 13.7 % (ref 11.5–15)
ERYTHROCYTE [SEDIMENTATION RATE] IN BLOOD: 2 MM/HR
EST. AVERAGE GLUCOSE BLD GHB EST-MCNC: 111.2 MG/DL
GLUCOSE SERPL-MCNC: 128 MG/DL (ref 70–105)
HBA1C MFR BLD: 5.5 % (ref 4.8–6)
HCT VFR BLD AUTO: 45.1 % (ref 38–50)
HGB BLD-MCNC: 15.7 G/DL (ref 13.2–17.2)
IMMATURE GRANULOCYTES (10*3/UL) LEUKOCYTES IN BLOOD BY AUTOMATED COUNT: <0.03 10*3/UL
IMMATURE GRANULOCYTES/100 LEUKOCYTES IN BLOOD BY AUTOMATED COUNT: 0.2 %
LYMPHOCYTES # BLD AUTO: 1.46 10*3/UL
LYMPHOCYTES NFR BLD AUTO: 26.4 % (ref 15–47)
MCH RBC QN AUTO: 33.5 PG (ref 29–34)
MCHC RBC AUTO-ENTMCNC: 34.8 G/DL (ref 32–36)
MCV RBC AUTO: 96.2 FL (ref 82–97)
MONOCYTES # BLD AUTO: 0.62 10*3/UL
MONOCYTES NFR BLD AUTO: 11.2 % (ref 5–13)
MUCOUS THREADS #/AREA URNS HPF: PRESENT /[HPF]
NEUTROPHILS # BLD AUTO: 3.36 10*3/UL
NEUTROPHILS NFR BLD AUTO: 60.8 % (ref 46–70)
NRBC (10*3/UL) BY AUTOMATED COUNT: 0 10*3/UL (ref 0–0.1)
NRBC BLD-RTO: 0 /100 WBCS (ref 0–2)
PLATELET # BLD AUTO: 196 10*3/UL (ref 130–350)
PMV BLD AUTO: 8.9 FL (ref 6.9–10.8)
POTASSIUM SERPL-SCNC: 4.1 MMOL/L (ref 3.5–5.1)
PROT SERPL-MCNC: 6.9 G/DL (ref 6–8.3)
RBC # BLD AUTO: 4.69 10*6/UL (ref 4.1–5.8)
RBC #/AREA URNS HPF: ABNORMAL /HPF
SODIUM SERPL-SCNC: 138 MMOL/L (ref 135–145)
SQUAMOUS #/AREA URNS HPF: ABNORMAL /HPF
TSH SERPL DL<=0.05 MIU/L-ACNC: 1.64 UIU/ML (ref 0.34–4.82)
VIT B12 SERPL-MCNC: 264 PG/ML (ref 180–914)
WBC # BLD AUTO: 5.5 10*3/UL (ref 3.7–9.6)
WBC #/AREA URNS HPF: ABNORMAL /HPF

## 2025-07-22 PROCEDURE — 84443 ASSAY THYROID STIM HORMONE: CPT

## 2025-07-22 PROCEDURE — 82306 VITAMIN D 25 HYDROXY: CPT

## 2025-07-22 PROCEDURE — 86140 C-REACTIVE PROTEIN: CPT | Performed by: FAMILY MEDICINE

## 2025-07-22 PROCEDURE — 73030 X-RAY EXAM OF SHOULDER: CPT | Mod: RT

## 2025-07-22 PROCEDURE — 83036 HEMOGLOBIN GLYCOSYLATED A1C: CPT

## 2025-07-22 PROCEDURE — 85025 COMPLETE CBC W/AUTO DIFF WBC: CPT | Performed by: FAMILY MEDICINE

## 2025-07-22 PROCEDURE — 36415 COLL VENOUS BLD VENIPUNCTURE: CPT | Performed by: FAMILY MEDICINE

## 2025-07-22 PROCEDURE — 81015 MICROSCOPIC EXAM OF URINE: CPT | Performed by: FAMILY MEDICINE

## 2025-07-22 PROCEDURE — 85652 RBC SED RATE AUTOMATED: CPT | Performed by: FAMILY MEDICINE

## 2025-07-22 PROCEDURE — G2211 COMPLEX E/M VISIT ADD ON: HCPCS | Performed by: FAMILY MEDICINE

## 2025-07-22 PROCEDURE — 20610 DRAIN/INJ JOINT/BURSA W/O US: CPT | Performed by: FAMILY MEDICINE

## 2025-07-22 PROCEDURE — G0463 HOSPITAL OUTPT CLINIC VISIT: HCPCS

## 2025-07-22 PROCEDURE — 6360000200 HC RX 636 W HCPCS (ALT 250 FOR IP): Mod: JZ

## 2025-07-22 PROCEDURE — 80053 COMPREHEN METABOLIC PANEL: CPT

## 2025-07-22 PROCEDURE — 99214 OFFICE O/P EST MOD 30 MIN: CPT | Performed by: FAMILY MEDICINE

## 2025-07-22 PROCEDURE — 82607 VITAMIN B-12: CPT

## 2025-07-22 RX ORDER — TRIAMCINOLONE ACETONIDE 40 MG/ML
40 INJECTION, SUSPENSION INTRA-ARTICULAR; INTRAMUSCULAR
Status: COMPLETED | OUTPATIENT
Start: 2025-07-22 | End: 2025-07-22

## 2025-07-22 RX ORDER — LIDOCAINE HYDROCHLORIDE 20 MG/ML
4 INJECTION, SOLUTION INFILTRATION; PERINEURAL
Status: COMPLETED | OUTPATIENT
Start: 2025-07-22 | End: 2025-07-22

## 2025-07-22 RX ADMIN — TRIAMCINOLONE ACETONIDE 40 MG: 40 INJECTION, SUSPENSION INTRA-ARTICULAR; INTRAMUSCULAR at 09:45

## 2025-07-22 RX ADMIN — LIDOCAINE HYDROCHLORIDE 4 ML: 20 INJECTION, SOLUTION INFILTRATION; PERINEURAL at 09:45

## 2025-07-22 ASSESSMENT — PAIN SCALES - GENERAL: PAINLEVEL_OUTOF10: 5

## 2025-07-22 NOTE — PROGRESS NOTES
Patient Instructions   Consider additional workup for fatigue, such as stress test and overnight oxygen study.         IMPRESSION AND PLAN  Diagnoses and all orders for this visit:    Chronic right shoulder pain  -     X-ray shoulder 2 or more views right  -     Injection/Arthrocentesis Joint/Tendon/Bursa    Primary osteoarthritis of right shoulder  -     Injection/Arthrocentesis Joint/Tendon/Bursa    Fatigue, unspecified type  -     Comprehensive metabolic panel Blood, Venous; Future  -     CBC w/auto differential Blood, Venous  -     C-reactive protein (Inflammation) Blood, Venous  -     Sedimentation rate, automated Blood, Venous  -     Urinalysis, microscopic Urine, Clean Catch  -     Thyroid Stimulating Hormone, Ultrasensitive Blood, Venous; Future  -     Vitamin B12 Blood, Venous; Future  -     Vitamin D 25 hydroxy Blood, Venous; Future  -     Hemoglobin A1c (glycosylated) Blood, Venous; Future    Abnormal findings on diagnostic imaging of other specified body structures  -     Thyroid Stimulating Hormone, Ultrasensitive Blood, Venous; Future    Body mass index (BMI) 30.0-30.9, adult  -     Vitamin D 25 hydroxy Blood, Venous; Future    Abnormal finding of blood chemistry, unspecified  -     Hemoglobin A1c (glycosylated) Blood, Venous; Future    Hyperlipidemia LDL goal <100    IFG (impaired fasting glucose)      Camilo is a 70 year old gentleman who presents for persistent right deltoid pain that I think is likely underlying to osteoarthritis of the right shoulder based on his exam.  Neurovascular exam intact radiographs were obtained that did not show signs of osteoarthritis we discussed treatment options he is already done conservative therapy with oral NSAIDs we discussed physical therapy with corticosteroid injection and patient elected to do corticosteroid injection for now.  Could consider physical therapy if symptoms are not improving and or advanced imaging with MRI or referral.  He has also been  experiencing fatigue and decreased exercise and stamina tolerance.  Given his age and underlying comorbidities concern for potentially underlying ischemia although he denies any chest pain or shortness of breath we did obtain laboratory evaluation today just to make sure there is no electrolyte abnormalities anemia thyroid hormone dysfunction.  If labs come back normal consideration would be stress test, overnight pulse oximetry study, etc.      HPI  Chief complaint for visit per nursing.       Chief Complaint   Patient presents with    Shoulder Pain     Right shoulder pain; pt describes it as being in the muscle and states he struggles to lift it due to the pain; ongoing a couple months; pt states this issue is not due to any initial trauma or event - states he does not experience joint pain elsewhere - left hand dominant        Camilo is a 70 yea rold gentleman who presents for right shoulder pain for the lasdt couple months patient notes that the pain is primarily over his superior part of his right shoulder overlying the deltoid muscle.  He states it is worse with movement especially overhead movement at he has pain when he lifts it.  He states that his range of motion although is still intact he is able to move it it is not as painful.  He denies any swelling injury or other event with it.  He states he has been taking ibuprofen for intermittently for the last 2 months but it still has not gotten better which is why he is here.  He is retired and is left-handed but does a lot of outdoors work.  He also notes that he has been significantly fatigued at least for the last year but noticed it this past winter when he was in [place] he usually can bike 3 to 5 miles however notes now that he gets tired just after 2.  He also notes that he used to be able to work the [employer] for couple hours but now gets tired after half an hour using it.  He is wondering about blood work to evaluate this.  He has never been evaluated  with a stress test or CT heart score although he is on a statin medication for hyperlipidemia atorvastatin daily that he takes Toprol-XL 25 mg daily for essential hypertension that is well-controlled with this.  He has never been evaluated for underlying sleep apnea but notes that he does a lot have troubles sleeping and does not think he snores.  He denies any chest pain or shortness of breath with his fatigue but he just feels wiped out.      PROBLEM LIST  Patient Active Problem List   Diagnosis    Hyperlipidemia LDL goal <100    Essential hypertension    Family history of colon cancer    Screening for deficiency anemia    Need for hepatitis C screening test    IFG (impaired fasting glucose)    Metabolic syndrome X    Primary osteoarthritis of right shoulder         SOCIAL HX  Social History     Socioeconomic History    Marital status:      Spouse name: Not on file    Number of children: Not on file    Years of education: Not on file    Highest education level: Not on file   Occupational History    Not on file   Tobacco Use    Smoking status: Former     Current packs/day: 0.00     Average packs/day: 1 pack/day for 5.0 years (5.0 ttl pk-yrs)     Types: Cigarettes     Start date: 1976     Quit date: 1981     Years since quittin.0    Smokeless tobacco: Never   Vaping Use    Vaping status: Never Used   Substance and Sexual Activity    Alcohol use: Yes     Alcohol/week: 14.0 standard drinks of alcohol     Types: 14 Shots of liquor per week     Comment: 2 to 3 drinks before dinner    Drug use: Never    Sexual activity: Yes     Partners: Female     Birth control/protection: Post-menopausal, Male Sterilization   Other Topics Concern    Not on file   Social History Narrative    Not on file     Social Drivers of Health     Financial Resource Strain: Low Risk  (10/7/2024)    Overall Financial Resource Strain (CARDIA)     Difficulty of Paying Living Expenses: Not hard at all   Food Insecurity: No Food  Insecurity (10/7/2024)    Hunger Vital Sign     Worried About Running Out of Food in the Last Year: Never true     Ran Out of Food in the Last Year: Never true   Transportation Needs: No Transportation Needs (10/7/2024)    PRAPARE - Transportation     Lack of Transportation (Medical): No     Lack of Transportation (Non-Medical): No   Physical Activity: Sufficiently Active (10/7/2024)    Exercise Vital Sign     Days of Exercise per Week: 7 days     Minutes of Exercise per Session: 50 min   Stress: No Stress Concern Present (10/7/2024)    [employer] - Occupational Stress Questionnaire     Feeling of Stress : Not at all   Social Connections: Moderately Integrated (10/7/2024)    Social Connection and Isolation Panel [NHANES]     Frequency of Communication with Friends and Family: More than three times a week     Frequency of Social Gatherings with Friends and Family: More than three times a week     Attends [employer] Services: 1 to 4 times per year     Active Member of Clubs or Organizations: Yes     Attends Club or Organization Meetings: More than 4 times per year     Marital Status:    Intimate Partner Violence: Not At Risk (10/7/2024)    Humiliation, Afraid, Rape, and Kick questionnaire     Fear of Current or Ex-Partner: No     Emotionally Abused: No     Physically Abused: No     Sexually Abused: No   Housing Stability: Low Risk  (10/7/2024)    Housing Stability Vital Sign     Unable to Pay for Housing in the Last Year: No     Number of Times Moved in the Last Year: 0     Homeless in the Last Year: No       FAMILY HX  Family History   Problem Relation Age of Onset    Valvular heart disease Father     Prostate cancer Father     Skin cancer Father     Colon cancer Brother 45        living    Skin cancer Mother     Cancer Brother        ROS  12 point review of systems performed is negative unless otherwise mentioned per HPI.    Physical Exam  /70 (BP Location: Left arm, Patient  "Position: Sitting, Cuff Size: Regular Adult)   Pulse 78   Resp 21   Wt 108.9 kg (240 lb)   SpO2 98%   BMI 29.21 kg/m²   BP Readings from Last 3 Encounters:   07/22/25 122/70   10/07/24 138/88   08/27/24 126/62       Physical Exam  Vitals and nursing note reviewed.   Constitutional:       General: He is not in acute distress.     Appearance: He is not ill-appearing or toxic-appearing.   HENT:      Head: Normocephalic and atraumatic.      Nose: Nose normal.      Mouth/Throat:      Mouth: Mucous membranes are moist.   Eyes:      Extraocular Movements: Extraocular movements intact.      Pupils: Pupils are equal, round, and reactive to light.   Cardiovascular:      Rate and Rhythm: Normal rate.   Pulmonary:      Effort: Pulmonary effort is normal.   Musculoskeletal:      Cervical back: Neck supple.      Comments: Tenderness to palpation of the anterior right joint line as well as overlying the deltoid muscle.  Negative Neer's and Kerr test.  2+ distal radial pulses.  Sensation light touch intact.  5 out of 5 motor strength in right deltoid    Skin:     General: Skin is warm.   Neurological:      Mental Status: He is alert and oriented to person, place, and time.   Psychiatric:         Mood and Affect: Mood normal.            LABS AND XRAYS  Lab Results   Component Value Date    GLUCOSE 79 10/07/2024    CALCIUM 9.5 10/07/2024     10/07/2024    K 4.2 10/07/2024    CO2 25 10/07/2024     10/07/2024    BUN 18 10/07/2024    CREATININE 0.79 10/07/2024    ANIONGAP 8 10/07/2024     Lab Results   Component Value Date    WBC 5.5 07/22/2025    HGB 15.7 07/22/2025    HCT 45.1 07/22/2025    MCV 96.2 07/22/2025     07/22/2025     No results found for: \"TSH\"  Lab Results   Component Value Date    HGBA1C 5.5 07/22/2025     Lab Results   Component Value Date    LDLCALC 56 10/07/2024    CREATININE 0.79 10/07/2024     No results found for: \"SEDRATE\"  No results found for: \"URACSRM\"  No results found for: " "\"AMYLASE\"  No results found for: \"LIPASE\"      No results found.    Injection/Arthrocentesis Joint/Tendon/Bursa  Performed by: Lorraine Banuelos MD       Consent    Verbal consent was obtained from the patient. Consent given by patient. The patient states understanding of the procedure being performed. Patient ID confirmed verbally with the patient.       Wound 1 Procedure Details  An injection was given to Blaise in his shoulder. The injection site was the R intraarticular/glenohumeral.    Procedure Details   Ethyl chloride spray local anesthesia was used.   A 27 gauge needle was inserted into the R intraarticular/glenohumeral  using a posterolateral approach . Aspirate was not obtained.   An injection was given in the  R intraarticular/glenohumeral site(s). The site was injected with 40 mg triamcinolone acetonide 40 mg/mL, 4 mL lidocaine 20 mg/mL (2 %).   Needle removed without complication.     Post-Procedure  Patient tolerated the procedure well with no immediate complications. A standard bandage was applied. Advised patient to avoid strenous activity for 24-48 hours. Advised patient to use ice, NSAIDs or acetaminophen for pain as needed.              MEDICATIONS    Current Outpatient Medications:     valACYclovir (VALTREX) 500 mg tablet, Take 2 tablets (1,000 mg total) by mouth as needed (as needed), Disp: 14 tablet, Rfl: 5    metoprolol succinate XL (TOPROL-XL) 25 mg 24 hr tablet, Take 1 tablet (25 mg total) by mouth daily, Disp: 90 tablet, Rfl: 3    atorvastatin (LIPITOR) 40 mg tablet, Take 1 tablet (40 mg total) by mouth daily, Disp: 90 tablet, Rfl: 3    glucosamine HCl/chondroitin schmidt (GLUCOSAMINE-CHONDROITIN ORAL), Take by mouth daily, Disp: , Rfl:     multivitamin with minerals tablet, Take 1 tablet by mouth daily, Disp: , Rfl:       SEE ABOVE FOR IMPRESSION AND PLAN    Lorraine Banuelos MD  07/22/25  10:56 AM  "

## 2025-08-05 ENCOUNTER — OFFICE VISIT (OUTPATIENT)
Dept: DERMATOLOGY | Facility: CLINIC | Age: 70
End: 2025-08-05
Payer: MEDICARE

## 2025-08-05 DIAGNOSIS — L57.8 ACTINIC SKIN DAMAGE: ICD-10-CM

## 2025-08-05 DIAGNOSIS — D18.01 CHERRY ANGIOMA: ICD-10-CM

## 2025-08-05 DIAGNOSIS — L72.11 PILAR CYST OF SCALP: ICD-10-CM

## 2025-08-05 DIAGNOSIS — L82.1 SEBORRHEIC KERATOSES: ICD-10-CM

## 2025-08-05 DIAGNOSIS — Z87.2 HISTORY OF ACTINIC KERATOSES: Primary | ICD-10-CM

## 2025-08-05 DIAGNOSIS — D17.1 LIPOMA OF BACK: ICD-10-CM

## 2025-08-05 DIAGNOSIS — L81.4 LENTIGINES: ICD-10-CM

## 2025-08-05 PROCEDURE — G0463 HOSPITAL OUTPT CLINIC VISIT: HCPCS | Mod: PO | Performed by: STUDENT IN AN ORGANIZED HEALTH CARE EDUCATION/TRAINING PROGRAM

## 2025-08-05 ASSESSMENT — PAIN SCALES - GENERAL: PAINLEVEL_OUTOF10: 0-NO PAIN

## 2025-08-05 ASSESSMENT — ENCOUNTER SYMPTOMS
BRUISES/BLEEDS EASILY: 1
FATIGUE: 0
FEVER: 0

## (undated) DEVICE — DRSG STERI STRIP 1/2X4" R1547

## (undated) DEVICE — DECANTER VIAL 2006S

## (undated) DEVICE — CANNULA NASAL NOMOLINE LH WITH CO2 ADULT NON-INTUBATED PATIENT

## (undated) DEVICE — FORCEP BIOPSY RADIAL JAW 4

## (undated) DEVICE — GLOVE GAMMEX DERMAPRENE ULTRA SZ 8.5 LF 8517

## (undated) DEVICE — PREP CHLORAPREP 26ML TINTED ORANGE  260815

## (undated) DEVICE — SU PROLENE 1 CTX 30" 8435H

## (undated) DEVICE — SU VICRYL 3-0 SH 27" J316H

## (undated) DEVICE — SOL WATER IRRIG 1000ML BOTTLE 2F7114

## (undated) DEVICE — SU PDS II 2-0 CT-2 27"  Z333H

## (undated) DEVICE — KIT ENDO PROCEDURE CARRY ON

## (undated) DEVICE — ESU GROUND PAD UNIVERSAL W/O CORD

## (undated) DEVICE — LINEN TOWEL PACK X5 5464

## (undated) DEVICE — DRAPE LAP W/ARMBOARD 29410

## (undated) DEVICE — TIP YANKAUER OPEN CLEAR

## (undated) DEVICE — SOL NACL 0.9% IRRIG 1000ML BOTTLE 07138-09

## (undated) DEVICE — TRAP POLYP E TRAP

## (undated) DEVICE — SU VICRYL 4-0 P-3 18" UND J494G

## (undated) DEVICE — TUBING SUCTION 3/16"X10'

## (undated) DEVICE — SNARE ENDOSCOPIC CAPTIVATOR  COLD 10MM ROUND STIFF

## (undated) DEVICE — PACK MINOR SBA15MIFSE

## (undated) RX ORDER — ONDANSETRON 2 MG/ML
INJECTION INTRAMUSCULAR; INTRAVENOUS
Status: DISPENSED
Start: 2018-10-23

## (undated) RX ORDER — LIDOCAINE HYDROCHLORIDE 20 MG/ML
INJECTION, SOLUTION EPIDURAL; INFILTRATION; INTRACAUDAL; PERINEURAL
Status: DISPENSED
Start: 2018-10-23

## (undated) RX ORDER — NEOSTIGMINE METHYLSULFATE 1 MG/ML
VIAL (ML) INJECTION
Status: DISPENSED
Start: 2018-10-23

## (undated) RX ORDER — GLYCOPYRROLATE 0.2 MG/ML
INJECTION, SOLUTION INTRAMUSCULAR; INTRAVENOUS
Status: DISPENSED
Start: 2018-10-23

## (undated) RX ORDER — HYDROCODONE BITARTRATE AND ACETAMINOPHEN 5; 325 MG/1; MG/1
TABLET ORAL
Status: DISPENSED
Start: 2018-10-23

## (undated) RX ORDER — FENTANYL CITRATE 50 UG/ML
INJECTION, SOLUTION INTRAMUSCULAR; INTRAVENOUS
Status: DISPENSED
Start: 2017-10-13

## (undated) RX ORDER — CEFAZOLIN SODIUM 2 G/100ML
INJECTION, SOLUTION INTRAVENOUS
Status: DISPENSED
Start: 2018-10-23

## (undated) RX ORDER — FENTANYL CITRATE 50 UG/ML
INJECTION, SOLUTION INTRAMUSCULAR; INTRAVENOUS
Status: DISPENSED
Start: 2018-10-23

## (undated) RX ORDER — DEXAMETHASONE SODIUM PHOSPHATE 4 MG/ML
INJECTION, SOLUTION INTRA-ARTICULAR; INTRALESIONAL; INTRAMUSCULAR; INTRAVENOUS; SOFT TISSUE
Status: DISPENSED
Start: 2018-10-23

## (undated) RX ORDER — PROPOFOL 10 MG/ML
INJECTION, EMULSION INTRAVENOUS
Status: DISPENSED
Start: 2018-10-23

## (undated) RX ORDER — BUPIVACAINE HYDROCHLORIDE AND EPINEPHRINE 5; 5 MG/ML; UG/ML
INJECTION, SOLUTION EPIDURAL; INTRACAUDAL; PERINEURAL
Status: DISPENSED
Start: 2018-10-23

## (undated) RX ORDER — LIDOCAINE HYDROCHLORIDE 10 MG/ML
INJECTION, SOLUTION EPIDURAL; INFILTRATION; INTRACAUDAL; PERINEURAL
Status: DISPENSED
Start: 2018-10-23